# Patient Record
Sex: MALE | Race: BLACK OR AFRICAN AMERICAN | Employment: OTHER | URBAN - NONMETROPOLITAN AREA
[De-identification: names, ages, dates, MRNs, and addresses within clinical notes are randomized per-mention and may not be internally consistent; named-entity substitution may affect disease eponyms.]

---

## 2020-01-01 ENCOUNTER — ANESTHESIA (OUTPATIENT)
Dept: ENDOSCOPY | Age: 65
DRG: 871 | End: 2020-01-01
Payer: MEDICARE

## 2020-01-01 ENCOUNTER — ANESTHESIA EVENT (OUTPATIENT)
Dept: ENDOSCOPY | Age: 65
DRG: 871 | End: 2020-01-01
Payer: MEDICARE

## 2020-01-01 ENCOUNTER — HOSPITAL ENCOUNTER (EMERGENCY)
Age: 65
Discharge: NURSING, REHAB OR LONG-TERM CARE | DRG: 871 | End: 2020-11-28
Attending: EMERGENCY MEDICINE
Payer: MEDICARE

## 2020-01-01 ENCOUNTER — APPOINTMENT (OUTPATIENT)
Dept: GENERAL RADIOLOGY | Age: 65
DRG: 871 | End: 2020-01-01
Attending: EMERGENCY MEDICINE
Payer: MEDICARE

## 2020-01-01 ENCOUNTER — HOSPITAL ENCOUNTER (INPATIENT)
Age: 65
LOS: 5 days | Discharge: NURSING, REHAB OR LONG-TERM CARE | DRG: 871 | End: 2021-01-05
Attending: EMERGENCY MEDICINE | Admitting: HOSPITALIST
Payer: MEDICARE

## 2020-01-01 ENCOUNTER — HOSPITAL ENCOUNTER (INPATIENT)
Age: 65
LOS: 5 days | Discharge: NURSING, REHAB OR LONG-TERM CARE | DRG: 871 | End: 2020-12-06
Attending: EMERGENCY MEDICINE | Admitting: HOSPITALIST
Payer: MEDICARE

## 2020-01-01 VITALS
OXYGEN SATURATION: 96 % | SYSTOLIC BLOOD PRESSURE: 117 MMHG | HEART RATE: 89 BPM | DIASTOLIC BLOOD PRESSURE: 69 MMHG | RESPIRATION RATE: 17 BRPM | TEMPERATURE: 98.9 F

## 2020-01-01 VITALS
HEIGHT: 60 IN | SYSTOLIC BLOOD PRESSURE: 118 MMHG | OXYGEN SATURATION: 97 % | BODY MASS INDEX: 25.54 KG/M2 | DIASTOLIC BLOOD PRESSURE: 75 MMHG | RESPIRATION RATE: 20 BRPM | HEART RATE: 86 BPM | TEMPERATURE: 98.3 F | WEIGHT: 130.1 LBS

## 2020-01-01 DIAGNOSIS — R06.02 SOB (SHORTNESS OF BREATH): Primary | ICD-10-CM

## 2020-01-01 DIAGNOSIS — N17.9 ACUTE RENAL FAILURE, UNSPECIFIED ACUTE RENAL FAILURE TYPE (HCC): ICD-10-CM

## 2020-01-01 DIAGNOSIS — R65.20 SEPSIS WITH ACUTE RENAL FAILURE, DUE TO UNSPECIFIED ORGANISM, UNSPECIFIED ACUTE RENAL FAILURE TYPE, UNSPECIFIED WHETHER SEPTIC SHOCK PRESENT (HCC): Primary | ICD-10-CM

## 2020-01-01 DIAGNOSIS — A41.9 SEPSIS WITH ACUTE RENAL FAILURE, DUE TO UNSPECIFIED ORGANISM, UNSPECIFIED ACUTE RENAL FAILURE TYPE, UNSPECIFIED WHETHER SEPTIC SHOCK PRESENT (HCC): Primary | ICD-10-CM

## 2020-01-01 DIAGNOSIS — F03.90 DEMENTIA WITHOUT BEHAVIORAL DISTURBANCE, UNSPECIFIED DEMENTIA TYPE: ICD-10-CM

## 2020-01-01 DIAGNOSIS — A41.9 SEPSIS WITHOUT ACUTE ORGAN DYSFUNCTION, DUE TO UNSPECIFIED ORGANISM (HCC): Primary | ICD-10-CM

## 2020-01-01 DIAGNOSIS — N17.9 SEPSIS WITH ACUTE RENAL FAILURE, DUE TO UNSPECIFIED ORGANISM, UNSPECIFIED ACUTE RENAL FAILURE TYPE, UNSPECIFIED WHETHER SEPTIC SHOCK PRESENT (HCC): Primary | ICD-10-CM

## 2020-01-01 LAB
ALBUMIN SERPL-MCNC: 1.9 G/DL (ref 3.5–5)
ALBUMIN SERPL-MCNC: 2.6 G/DL (ref 3.5–5)
ALBUMIN SERPL-MCNC: 2.7 G/DL (ref 3.5–5)
ALBUMIN/GLOB SERPL: 0.3 {RATIO} (ref 1.1–2.2)
ALBUMIN/GLOB SERPL: 0.5 {RATIO} (ref 1.1–2.2)
ALBUMIN/GLOB SERPL: 0.5 {RATIO} (ref 1.1–2.2)
ALP SERPL-CCNC: 101 U/L (ref 45–117)
ALP SERPL-CCNC: 104 U/L (ref 45–117)
ALP SERPL-CCNC: 98 U/L (ref 45–117)
ALT SERPL-CCNC: 13 U/L (ref 12–78)
ALT SERPL-CCNC: 13 U/L (ref 12–78)
ALT SERPL-CCNC: 40 U/L (ref 12–78)
AMORPH CRY URNS QL MICRO: ABNORMAL
ANION GAP SERPL CALC-SCNC: 10 MMOL/L (ref 5–15)
ANION GAP SERPL CALC-SCNC: 10 MMOL/L (ref 5–15)
ANION GAP SERPL CALC-SCNC: 11 MMOL/L (ref 5–15)
ANION GAP SERPL CALC-SCNC: 11 MMOL/L (ref 5–15)
ANION GAP SERPL CALC-SCNC: 13 MMOL/L (ref 5–15)
ANION GAP SERPL CALC-SCNC: 14 MMOL/L (ref 5–15)
ANION GAP SERPL CALC-SCNC: 9 MMOL/L (ref 5–15)
APPEARANCE UR: ABNORMAL
APPEARANCE UR: CLEAR
ARTERIAL PATENCY WRIST A: ABNORMAL
ARTERIAL PATENCY WRIST A: ABNORMAL
AST SERPL W P-5'-P-CCNC: 11 U/L (ref 15–37)
AST SERPL W P-5'-P-CCNC: 31 U/L (ref 15–37)
AST SERPL W P-5'-P-CCNC: 32 U/L (ref 15–37)
ATRIAL RATE: 122 BPM
ATRIAL RATE: 88 BPM
ATRIAL RATE: 91 BPM
BACTERIA SPEC CULT: ABNORMAL
BACTERIA SPEC CULT: NORMAL
BACTERIA SPEC CULT: NORMAL
BACTERIA URNS QL MICRO: ABNORMAL /HPF
BACTERIA URNS QL MICRO: ABNORMAL /HPF
BASE DEFICIT BLDA-SCNC: 4 MMOL/L (ref 0–2)
BASE EXCESS BLDA CALC-SCNC: 0.6 MMOL/L (ref 0–2)
BASOPHILS # BLD: 0 K/UL (ref 0–0.2)
BASOPHILS # BLD: 0.1 K/UL (ref 0–0.2)
BASOPHILS NFR BLD: 0 % (ref 0–2.5)
BASOPHILS NFR BLD: 0 % (ref 0–2.5)
BASOPHILS NFR BLD: 1 % (ref 0–2.5)
BDY SITE: ABNORMAL
BDY SITE: ABNORMAL
BILIRUB SERPL-MCNC: 0.4 MG/DL (ref 0.2–1)
BILIRUB SERPL-MCNC: 0.5 MG/DL (ref 0.2–1)
BILIRUB SERPL-MCNC: 0.7 MG/DL (ref 0.2–1)
BILIRUB UR QL CFM: ABNORMAL
BILIRUB UR QL: NEGATIVE
BNP SERPL-MCNC: 328 PG/ML
BREATHS.SPONTANEOUS ON VENT: 14
BUN SERPL-MCNC: 12 MG/DL (ref 6–20)
BUN SERPL-MCNC: 34 MG/DL (ref 6–20)
BUN SERPL-MCNC: 34 MG/DL (ref 6–20)
BUN SERPL-MCNC: 35 MG/DL (ref 6–20)
BUN SERPL-MCNC: 42 MG/DL (ref 6–20)
BUN SERPL-MCNC: 45 MG/DL (ref 6–20)
BUN SERPL-MCNC: 7 MG/DL (ref 6–20)
BUN/CREAT SERPL: 14 (ref 12–20)
BUN/CREAT SERPL: 21 (ref 12–20)
BUN/CREAT SERPL: 26 (ref 12–20)
BUN/CREAT SERPL: 27 (ref 12–20)
BUN/CREAT SERPL: 35 (ref 12–20)
BUN/CREAT SERPL: 38 (ref 12–20)
BUN/CREAT SERPL: 9 (ref 12–20)
CA-I BLD-MCNC: 7.4 MG/DL (ref 8.5–10.1)
CA-I BLD-MCNC: 7.9 MG/DL (ref 8.5–10.1)
CA-I BLD-MCNC: 8.2 MG/DL (ref 8.5–10.1)
CA-I BLD-MCNC: 8.6 MG/DL (ref 8.5–10.1)
CA-I BLD-MCNC: 8.7 MG/DL (ref 8.5–10.1)
CA-I BLD-MCNC: 8.8 MG/DL (ref 8.5–10.1)
CA-I BLD-MCNC: 9.1 MG/DL (ref 8.5–10.1)
CALCULATED P AXIS, ECG09: 77 DEGREES
CALCULATED P AXIS, ECG09: 82 DEGREES
CALCULATED P AXIS, ECG09: 87 DEGREES
CALCULATED R AXIS, ECG10: 61 DEGREES
CALCULATED R AXIS, ECG10: 73 DEGREES
CALCULATED R AXIS, ECG10: 77 DEGREES
CALCULATED T AXIS, ECG11: 77 DEGREES
CALCULATED T AXIS, ECG11: 85 DEGREES
CALCULATED T AXIS, ECG11: 86 DEGREES
CHLORIDE SERPL-SCNC: 107 MMOL/L (ref 97–108)
CHLORIDE SERPL-SCNC: 111 MMOL/L (ref 97–108)
CHLORIDE SERPL-SCNC: 113 MMOL/L (ref 97–108)
CHLORIDE SERPL-SCNC: 114 MMOL/L (ref 97–108)
CHLORIDE SERPL-SCNC: 118 MMOL/L (ref 97–108)
CHLORIDE SERPL-SCNC: 119 MMOL/L (ref 97–108)
CHLORIDE SERPL-SCNC: 120 MMOL/L (ref 97–108)
CO2 SERPL-SCNC: 21 MMOL/L (ref 21–32)
CO2 SERPL-SCNC: 22 MMOL/L (ref 21–32)
CO2 SERPL-SCNC: 24 MMOL/L (ref 21–32)
CO2 SERPL-SCNC: 25 MMOL/L (ref 21–32)
CO2 SERPL-SCNC: 26 MMOL/L (ref 21–32)
COHGB MFR BLD: 0.3 % (ref 0–5)
COHGB MFR BLD: 0.3 % (ref 0–5)
COLONY COUNT,CNT: ABNORMAL
COLOR UR: ABNORMAL
COLOR UR: YELLOW
COVID-19 RAPID TEST, COVR: NEGATIVE
COVID-19 RAPID TEST, COVR: NOT DETECTED
CREAT SERPL-MCNC: 0.79 MG/DL (ref 0.7–1.3)
CREAT SERPL-MCNC: 0.88 MG/DL (ref 0.7–1.3)
CREAT SERPL-MCNC: 0.93 MG/DL (ref 0.7–1.3)
CREAT SERPL-MCNC: 0.97 MG/DL (ref 0.7–1.3)
CREAT SERPL-MCNC: 1.24 MG/DL (ref 0.7–1.3)
CREAT SERPL-MCNC: 1.59 MG/DL (ref 0.7–1.3)
CREAT SERPL-MCNC: 2.15 MG/DL (ref 0.7–1.3)
DIAGNOSIS, 93000: NORMAL
EOSINOPHIL # BLD: 0 K/UL (ref 0–0.7)
EOSINOPHIL NFR BLD: 0 % (ref 0.9–2.9)
EOSINOPHIL NFR BLD: 1 % (ref 0.9–2.9)
EOSINOPHIL NFR BLD: 1 % (ref 0.9–2.9)
EPAP/CPAP/PEEP, PAPEEP: 6
EPITH CASTS URNS QL MICRO: ABNORMAL /LPF
ERYTHROCYTE [DISTWIDTH] IN BLOOD BY AUTOMATED COUNT: 15.4 % (ref 11.5–14.5)
ERYTHROCYTE [DISTWIDTH] IN BLOOD BY AUTOMATED COUNT: 15.7 % (ref 11.5–14.5)
ERYTHROCYTE [DISTWIDTH] IN BLOOD BY AUTOMATED COUNT: 16.2 % (ref 11.5–14.5)
ERYTHROCYTE [DISTWIDTH] IN BLOOD BY AUTOMATED COUNT: 16.3 % (ref 11.5–14.5)
ERYTHROCYTE [DISTWIDTH] IN BLOOD BY AUTOMATED COUNT: 16.4 % (ref 11.5–14.5)
ERYTHROCYTE [DISTWIDTH] IN BLOOD BY AUTOMATED COUNT: 16.5 % (ref 11.5–14.5)
EST. AVERAGE GLUCOSE BLD GHB EST-MCNC: 114 MG/DL
FIO2 ON VENT: 32 %
FIO2 ON VENT: 60 %
GAS FLOW.O2 O2 DELIVERY SYS: 3 L/MIN
GLOBULIN SER CALC-MCNC: 5 G/DL (ref 2–4)
GLOBULIN SER CALC-MCNC: 5.4 G/DL (ref 2–4)
GLOBULIN SER CALC-MCNC: 6 G/DL (ref 2–4)
GLUCOSE BLD STRIP.AUTO-MCNC: 106 MG/DL (ref 65–100)
GLUCOSE BLD STRIP.AUTO-MCNC: 113 MG/DL (ref 65–100)
GLUCOSE BLD STRIP.AUTO-MCNC: 143 MG/DL (ref 65–100)
GLUCOSE BLD STRIP.AUTO-MCNC: 158 MG/DL (ref 65–100)
GLUCOSE BLD STRIP.AUTO-MCNC: 160 MG/DL (ref 65–100)
GLUCOSE BLD STRIP.AUTO-MCNC: 162 MG/DL (ref 65–100)
GLUCOSE BLD STRIP.AUTO-MCNC: 163 MG/DL (ref 65–100)
GLUCOSE BLD STRIP.AUTO-MCNC: 165 MG/DL (ref 65–100)
GLUCOSE BLD STRIP.AUTO-MCNC: 169 MG/DL (ref 65–100)
GLUCOSE BLD STRIP.AUTO-MCNC: 183 MG/DL (ref 65–100)
GLUCOSE BLD STRIP.AUTO-MCNC: 184 MG/DL (ref 65–100)
GLUCOSE BLD STRIP.AUTO-MCNC: 203 MG/DL (ref 65–100)
GLUCOSE BLD STRIP.AUTO-MCNC: 218 MG/DL (ref 65–100)
GLUCOSE BLD STRIP.AUTO-MCNC: 229 MG/DL (ref 65–100)
GLUCOSE BLD STRIP.AUTO-MCNC: 65 MG/DL (ref 65–100)
GLUCOSE BLD STRIP.AUTO-MCNC: 69 MG/DL (ref 65–100)
GLUCOSE BLD STRIP.AUTO-MCNC: 73 MG/DL (ref 65–100)
GLUCOSE BLD STRIP.AUTO-MCNC: 74 MG/DL (ref 65–100)
GLUCOSE BLD STRIP.AUTO-MCNC: 74 MG/DL (ref 65–100)
GLUCOSE BLD STRIP.AUTO-MCNC: 78 MG/DL (ref 65–100)
GLUCOSE BLD STRIP.AUTO-MCNC: 80 MG/DL (ref 65–100)
GLUCOSE BLD STRIP.AUTO-MCNC: 84 MG/DL (ref 65–100)
GLUCOSE BLD STRIP.AUTO-MCNC: 85 MG/DL (ref 65–100)
GLUCOSE BLD STRIP.AUTO-MCNC: 86 MG/DL (ref 65–100)
GLUCOSE BLD STRIP.AUTO-MCNC: 93 MG/DL (ref 65–100)
GLUCOSE BLD STRIP.AUTO-MCNC: 96 MG/DL (ref 65–100)
GLUCOSE SERPL-MCNC: 119 MG/DL (ref 65–100)
GLUCOSE SERPL-MCNC: 142 MG/DL (ref 65–100)
GLUCOSE SERPL-MCNC: 177 MG/DL (ref 65–100)
GLUCOSE SERPL-MCNC: 181 MG/DL (ref 65–100)
GLUCOSE SERPL-MCNC: 187 MG/DL (ref 65–100)
GLUCOSE SERPL-MCNC: 255 MG/DL (ref 65–100)
GLUCOSE SERPL-MCNC: 89 MG/DL (ref 65–100)
GLUCOSE UR STRIP.AUTO-MCNC: NEGATIVE MG/DL
GLUCOSE UR STRIP.AUTO-MCNC: NEGATIVE MG/DL
HBA1C MFR BLD: 5.6 % (ref 4–5.6)
HCO3 BLDA-SCNC: 19 MMOL/L (ref 22–26)
HCO3 BLDA-SCNC: 23 MMOL/L (ref 22–26)
HCT VFR BLD AUTO: 29.1 % (ref 41–53)
HCT VFR BLD AUTO: 30.1 % (ref 41–53)
HCT VFR BLD AUTO: 30.4 % (ref 41–53)
HCT VFR BLD AUTO: 32.3 % (ref 41–53)
HCT VFR BLD AUTO: 33.2 % (ref 41–53)
HCT VFR BLD AUTO: 36.2 % (ref 41–53)
HGB BLD OXIMETRY-MCNC: 11.3 G/DL (ref 13.5–17.5)
HGB BLD OXIMETRY-MCNC: 11.4 G/DL (ref 13.5–17.5)
HGB BLD-MCNC: 10.2 G/DL (ref 13.5–17.5)
HGB BLD-MCNC: 11.1 G/DL (ref 13.5–17.5)
HGB BLD-MCNC: 11.7 G/DL (ref 13.5–17.5)
HGB BLD-MCNC: 9.5 G/DL (ref 13.5–17.5)
HGB BLD-MCNC: 9.7 G/DL (ref 13.5–17.5)
HGB BLD-MCNC: 9.8 G/DL (ref 13.5–17.5)
HGB UR QL STRIP: NEGATIVE
HGB UR QL STRIP: NEGATIVE
INR PPP: 1.1 (ref 0.9–1.1)
INR PPP: 1.2 (ref 0.9–1.1)
KETONES UR QL STRIP.AUTO: NEGATIVE MG/DL
KETONES UR QL STRIP.AUTO: NEGATIVE MG/DL
LACTATE SERPL-SCNC: 0.9 MMOL/L (ref 0.4–2)
LACTATE SERPL-SCNC: 1.8 MMOL/L (ref 0.4–2)
LACTATE SERPL-SCNC: 2.6 MMOL/L (ref 0.4–2)
LACTATE SERPL-SCNC: 3 MMOL/L (ref 0.4–2)
LACTATE SERPL-SCNC: 3 MMOL/L (ref 0.4–2)
LACTATE SERPL-SCNC: 3.5 MMOL/L (ref 0.4–2)
LACTATE SERPL-SCNC: 3.7 MMOL/L (ref 0.4–2)
LACTATE SERPL-SCNC: 4.1 MMOL/L (ref 0.4–2)
LEUKOCYTE ESTERASE UR QL STRIP.AUTO: ABNORMAL
LEUKOCYTE ESTERASE UR QL STRIP.AUTO: NEGATIVE
LIPASE SERPL-CCNC: 67 U/L (ref 73–393)
LYMPHOCYTES # BLD: 1.1 K/UL (ref 1–4.8)
LYMPHOCYTES # BLD: 1.3 K/UL (ref 1–4.8)
LYMPHOCYTES # BLD: 1.4 K/UL (ref 1–4.8)
LYMPHOCYTES # BLD: 1.6 K/UL (ref 1–4.8)
LYMPHOCYTES # BLD: 1.9 K/UL (ref 1–4.8)
LYMPHOCYTES # BLD: 1.9 K/UL (ref 1–4.8)
LYMPHOCYTES NFR BLD: 10 % (ref 20.5–51.1)
LYMPHOCYTES NFR BLD: 11 % (ref 20.5–51.1)
LYMPHOCYTES NFR BLD: 13 % (ref 20.5–51.1)
LYMPHOCYTES NFR BLD: 19 % (ref 20.5–51.1)
LYMPHOCYTES NFR BLD: 25 % (ref 20.5–51.1)
LYMPHOCYTES NFR BLD: 27 % (ref 20.5–51.1)
MAGNESIUM SERPL-MCNC: 2.1 MG/DL (ref 1.6–2.4)
MAGNESIUM SERPL-MCNC: 2.4 MG/DL (ref 1.6–2.4)
MAGNESIUM SERPL-MCNC: 2.5 MG/DL (ref 1.6–2.4)
MCH RBC QN AUTO: 28.4 PG (ref 31–34)
MCH RBC QN AUTO: 29.3 PG (ref 31–34)
MCH RBC QN AUTO: 29.3 PG (ref 31–34)
MCH RBC QN AUTO: 29.4 PG (ref 31–34)
MCH RBC QN AUTO: 29.5 PG (ref 31–34)
MCH RBC QN AUTO: 29.6 PG (ref 31–34)
MCHC RBC AUTO-ENTMCNC: 31.7 G/DL (ref 31–36)
MCHC RBC AUTO-ENTMCNC: 32.1 G/DL (ref 31–36)
MCHC RBC AUTO-ENTMCNC: 32.4 G/DL (ref 31–36)
MCHC RBC AUTO-ENTMCNC: 32.5 G/DL (ref 31–36)
MCHC RBC AUTO-ENTMCNC: 32.8 G/DL (ref 31–36)
MCHC RBC AUTO-ENTMCNC: 33.3 G/DL (ref 31–36)
MCV RBC AUTO: 88.8 FL (ref 80–100)
MCV RBC AUTO: 89.3 FL (ref 80–100)
MCV RBC AUTO: 89.5 FL (ref 80–100)
MCV RBC AUTO: 90.3 FL (ref 80–100)
MCV RBC AUTO: 90.5 FL (ref 80–100)
MCV RBC AUTO: 92 FL (ref 80–100)
METHGB MFR BLD: 0.3 % (ref 0–5)
METHGB MFR BLD: 0.3 % (ref 0–5)
MONOCYTES # BLD: 0.3 K/UL (ref 0.2–2.4)
MONOCYTES # BLD: 0.5 K/UL (ref 0.2–2.4)
MONOCYTES # BLD: 0.6 K/UL (ref 0.2–2.4)
MONOCYTES # BLD: 0.6 K/UL (ref 0.2–2.4)
MONOCYTES # BLD: 0.7 K/UL (ref 0.2–2.4)
MONOCYTES # BLD: 1.1 K/UL (ref 0.2–2.4)
MONOCYTES NFR BLD: 5 % (ref 1.7–9.3)
MONOCYTES NFR BLD: 6 % (ref 1.7–9.3)
MONOCYTES NFR BLD: 6 % (ref 1.7–9.3)
MONOCYTES NFR BLD: 7 % (ref 1.7–9.3)
MONOCYTES NFR BLD: 8 % (ref 1.7–9.3)
MONOCYTES NFR BLD: 8 % (ref 1.7–9.3)
NEUTS SEG # BLD: 11.3 K/UL (ref 1.8–7.7)
NEUTS SEG # BLD: 4.5 K/UL (ref 1.8–7.7)
NEUTS SEG # BLD: 5.1 K/UL (ref 1.8–7.7)
NEUTS SEG # BLD: 6.1 K/UL (ref 1.8–7.7)
NEUTS SEG # BLD: 8.3 K/UL (ref 1.8–7.7)
NEUTS SEG # BLD: 8.8 K/UL (ref 1.8–7.7)
NEUTS SEG NFR BLD: 66 % (ref 42–75)
NEUTS SEG NFR BLD: 67 % (ref 42–75)
NEUTS SEG NFR BLD: 72 % (ref 42–75)
NEUTS SEG NFR BLD: 80 % (ref 42–75)
NEUTS SEG NFR BLD: 81 % (ref 42–75)
NEUTS SEG NFR BLD: 83 % (ref 42–75)
NITRITE UR QL STRIP.AUTO: NEGATIVE
NITRITE UR QL STRIP.AUTO: NEGATIVE
NRBC # BLD: 0 K/UL
NRBC # BLD: 0 K/UL
NRBC # BLD: 0.01 K/UL
NRBC BLD-RTO: 0 PER 100 WBC
NRBC BLD-RTO: 0 PER 100 WBC
NRBC BLD-RTO: 10 PER 100 WBC
OXYHGB MFR BLD: 94 % (ref 95–100)
OXYHGB MFR BLD: 98.3 % (ref 95–100)
P-R INTERVAL, ECG05: 116 MS
P-R INTERVAL, ECG05: 122 MS
P-R INTERVAL, ECG05: 151 MS
PCO2 BLDA: 27 MMHG (ref 35–45)
PCO2 BLDA: 29 MMHG (ref 35–45)
PERFORMED BY, TECHID: ABNORMAL
PERFORMED BY, TECHID: NORMAL
PH BLDA: 7.46 [PH] (ref 7.35–7.45)
PH BLDA: 7.52 [PH] (ref 7.35–7.45)
PH UR STRIP: 5.5 [PH] (ref 5–8)
PH UR STRIP: 8.5 [PH] (ref 5–8)
PLATELET # BLD AUTO: 297 K/UL
PLATELET # BLD AUTO: 316 K/UL
PLATELET # BLD AUTO: 383 K/UL
PLATELET # BLD AUTO: 453 K/UL
PLATELET # BLD AUTO: 471 K/UL
PLATELET # BLD AUTO: 506 K/UL
PMV BLD AUTO: 6.9 FL (ref 6.5–11.5)
PMV BLD AUTO: 7.7 FL (ref 6.5–11.5)
PMV BLD AUTO: 7.7 FL (ref 6.5–11.5)
PMV BLD AUTO: 7.8 FL (ref 6.5–11.5)
PMV BLD AUTO: 8.5 FL (ref 6.5–11.5)
PMV BLD AUTO: 8.7 FL (ref 6.5–11.5)
PO2 BLDA: 149 MMHG (ref 70–100)
PO2 BLDA: 71 MMHG (ref 70–100)
POTASSIUM SERPL-SCNC: 3 MMOL/L (ref 3.5–5.1)
POTASSIUM SERPL-SCNC: 3.2 MMOL/L (ref 3.5–5.1)
POTASSIUM SERPL-SCNC: 3.5 MMOL/L (ref 3.5–5.1)
POTASSIUM SERPL-SCNC: 3.7 MMOL/L (ref 3.5–5.1)
POTASSIUM SERPL-SCNC: 4.2 MMOL/L (ref 3.5–5.1)
POTASSIUM SERPL-SCNC: 4.2 MMOL/L (ref 3.5–5.1)
POTASSIUM SERPL-SCNC: 4.6 MMOL/L (ref 3.5–5.1)
PRESSURE SUPPORT SETTING VENT: 6 CM[H2O]
PROT SERPL-MCNC: 7.6 G/DL (ref 6.4–8.2)
PROT SERPL-MCNC: 7.9 G/DL (ref 6.4–8.2)
PROT SERPL-MCNC: 8.1 G/DL (ref 6.4–8.2)
PROT UR STRIP-MCNC: 30 MG/DL
PROT UR STRIP-MCNC: ABNORMAL MG/DL
PROTHROMBIN TIME: 10.8 SEC (ref 9–11.1)
PROTHROMBIN TIME: 11.5 SEC (ref 9–11.1)
Q-T INTERVAL, ECG07: 309 MS
Q-T INTERVAL, ECG07: 371 MS
Q-T INTERVAL, ECG07: 371 MS
QRS DURATION, ECG06: 65 MS
QRS DURATION, ECG06: 76 MS
QRS DURATION, ECG06: 79 MS
QTC CALCULATION (BEZET), ECG08: 442 MS
QTC CALCULATION (BEZET), ECG08: 447 MS
QTC CALCULATION (BEZET), ECG08: 457 MS
RBC # BLD AUTO: 3.25 M/UL (ref 4.5–5.9)
RBC # BLD AUTO: 3.3 M/UL (ref 4.5–5.9)
RBC # BLD AUTO: 3.34 M/UL (ref 4.5–5.9)
RBC # BLD AUTO: 3.6 M/UL (ref 4.5–5.9)
RBC # BLD AUTO: 3.74 M/UL (ref 4.5–5.9)
RBC # BLD AUTO: 4 M/UL (ref 4.5–5.9)
RBC #/AREA URNS HPF: ABNORMAL /HPF (ref 0–3)
RBC #/AREA URNS HPF: ABNORMAL /HPF (ref 0–3)
SAO2% DEVICE SAO2% SENSOR NAME: ABNORMAL
SAO2% DEVICE SAO2% SENSOR NAME: ABNORMAL
SARS-COV-2, COV2: NORMAL
SODIUM SERPL-SCNC: 142 MMOL/L (ref 136–145)
SODIUM SERPL-SCNC: 146 MMOL/L (ref 136–145)
SODIUM SERPL-SCNC: 148 MMOL/L (ref 136–145)
SODIUM SERPL-SCNC: 149 MMOL/L (ref 136–145)
SODIUM SERPL-SCNC: 150 MMOL/L (ref 136–145)
SODIUM SERPL-SCNC: 152 MMOL/L (ref 136–145)
SODIUM SERPL-SCNC: 155 MMOL/L (ref 136–145)
SP GR UR REFRACTOMETRY: 1.01 (ref 1–1.03)
SP GR UR REFRACTOMETRY: 1.02 (ref 1–1.03)
SPECIAL REQUESTS,SREQ: ABNORMAL
SPECIAL REQUESTS,SREQ: NORMAL
SPECIAL REQUESTS,SREQ: NORMAL
SPECIMEN SITE: ABNORMAL
SPECIMEN SITE: ABNORMAL
SPECIMEN SOURCE: ABNORMAL
SPECIMEN SOURCE: NORMAL
TROPONIN I SERPL-MCNC: <0.05 NG/ML
UROBILINOGEN UR QL STRIP.AUTO: 1 EU/DL (ref 0.2–1)
UROBILINOGEN UR QL STRIP.AUTO: 1 EU/DL (ref 0.2–1)
VENTRICULAR RATE, ECG03: 123 BPM
VENTRICULAR RATE, ECG03: 87 BPM
VENTRICULAR RATE, ECG03: 91 BPM
WBC # BLD AUTO: 10.3 K/UL (ref 4.4–11.3)
WBC # BLD AUTO: 10.6 K/UL (ref 4.4–11.3)
WBC # BLD AUTO: 13.9 K/UL (ref 4.4–11.3)
WBC # BLD AUTO: 6.8 K/UL (ref 4.4–11.3)
WBC # BLD AUTO: 7.5 K/UL (ref 4.4–11.3)
WBC # BLD AUTO: 8.4 K/UL (ref 4.4–11.3)
WBC URNS QL MICRO: ABNORMAL /HPF (ref 0–5)
WBC URNS QL MICRO: ABNORMAL /HPF (ref 0–5)

## 2020-01-01 PROCEDURE — 74011636637 HC RX REV CODE- 636/637: Performed by: HOSPITALIST

## 2020-01-01 PROCEDURE — 82962 GLUCOSE BLOOD TEST: CPT

## 2020-01-01 PROCEDURE — 74011250637 HC RX REV CODE- 250/637: Performed by: HOSPITALIST

## 2020-01-01 PROCEDURE — 96372 THER/PROPH/DIAG INJ SC/IM: CPT

## 2020-01-01 PROCEDURE — 74011000250 HC RX REV CODE- 250: Performed by: NURSE ANESTHETIST, CERTIFIED REGISTERED

## 2020-01-01 PROCEDURE — 74011000250 HC RX REV CODE- 250: Performed by: HOSPITALIST

## 2020-01-01 PROCEDURE — 94640 AIRWAY INHALATION TREATMENT: CPT

## 2020-01-01 PROCEDURE — 85025 COMPLETE CBC W/AUTO DIFF WBC: CPT

## 2020-01-01 PROCEDURE — 96365 THER/PROPH/DIAG IV INF INIT: CPT

## 2020-01-01 PROCEDURE — 74011000258 HC RX REV CODE- 258: Performed by: HOSPITALIST

## 2020-01-01 PROCEDURE — 65270000029 HC RM PRIVATE

## 2020-01-01 PROCEDURE — 3E0F7SF INTRODUCTION OF OTHER GAS INTO RESPIRATORY TRACT, VIA NATURAL OR ARTIFICIAL OPENING: ICD-10-PCS | Performed by: HOSPITALIST

## 2020-01-01 PROCEDURE — 76060000032 HC ANESTHESIA 0.5 TO 1 HR: Performed by: INTERNAL MEDICINE

## 2020-01-01 PROCEDURE — 99218 HC RM OBSERVATION: CPT

## 2020-01-01 PROCEDURE — 80053 COMPREHEN METABOLIC PANEL: CPT

## 2020-01-01 PROCEDURE — 99283 EMERGENCY DEPT VISIT LOW MDM: CPT

## 2020-01-01 PROCEDURE — 36415 COLL VENOUS BLD VENIPUNCTURE: CPT

## 2020-01-01 PROCEDURE — 36600 WITHDRAWAL OF ARTERIAL BLOOD: CPT

## 2020-01-01 PROCEDURE — 74011250636 HC RX REV CODE- 250/636: Performed by: FAMILY MEDICINE

## 2020-01-01 PROCEDURE — 74011250636 HC RX REV CODE- 250/636: Performed by: HOSPITALIST

## 2020-01-01 PROCEDURE — 77010033678 HC OXYGEN DAILY

## 2020-01-01 PROCEDURE — 83605 ASSAY OF LACTIC ACID: CPT

## 2020-01-01 PROCEDURE — 94760 N-INVAS EAR/PLS OXIMETRY 1: CPT

## 2020-01-01 PROCEDURE — 81003 URINALYSIS AUTO W/O SCOPE: CPT

## 2020-01-01 PROCEDURE — 74011250637 HC RX REV CODE- 250/637: Performed by: EMERGENCY MEDICINE

## 2020-01-01 PROCEDURE — 94660 CPAP INITIATION&MGMT: CPT

## 2020-01-01 PROCEDURE — 87070 CULTURE OTHR SPECIMN AEROBIC: CPT

## 2020-01-01 PROCEDURE — 71045 X-RAY EXAM CHEST 1 VIEW: CPT

## 2020-01-01 PROCEDURE — 80048 BASIC METABOLIC PNL TOTAL CA: CPT

## 2020-01-01 PROCEDURE — 77030005122 HC CATH GASTMY PEG BSC -B: Performed by: INTERNAL MEDICINE

## 2020-01-01 PROCEDURE — 93005 ELECTROCARDIOGRAM TRACING: CPT

## 2020-01-01 PROCEDURE — 96375 TX/PRO/DX INJ NEW DRUG ADDON: CPT

## 2020-01-01 PROCEDURE — 85610 PROTHROMBIN TIME: CPT

## 2020-01-01 PROCEDURE — 84484 ASSAY OF TROPONIN QUANT: CPT

## 2020-01-01 PROCEDURE — 96366 THER/PROPH/DIAG IV INF ADDON: CPT

## 2020-01-01 PROCEDURE — 83690 ASSAY OF LIPASE: CPT

## 2020-01-01 PROCEDURE — 99223 1ST HOSP IP/OBS HIGH 75: CPT | Performed by: INTERNAL MEDICINE

## 2020-01-01 PROCEDURE — 83735 ASSAY OF MAGNESIUM: CPT

## 2020-01-01 PROCEDURE — 5A09357 ASSISTANCE WITH RESPIRATORY VENTILATION, LESS THAN 24 CONSECUTIVE HOURS, CONTINUOUS POSITIVE AIRWAY PRESSURE: ICD-10-PCS | Performed by: HOSPITALIST

## 2020-01-01 PROCEDURE — 96367 TX/PROPH/DG ADDL SEQ IV INF: CPT

## 2020-01-01 PROCEDURE — 74011000258 HC RX REV CODE- 258: Performed by: NURSE ANESTHETIST, CERTIFIED REGISTERED

## 2020-01-01 PROCEDURE — 81001 URINALYSIS AUTO W/SCOPE: CPT

## 2020-01-01 PROCEDURE — 74011250637 HC RX REV CODE- 250/637: Performed by: NURSE PRACTITIONER

## 2020-01-01 PROCEDURE — 96376 TX/PRO/DX INJ SAME DRUG ADON: CPT

## 2020-01-01 PROCEDURE — 3E0G76Z INTRODUCTION OF NUTRITIONAL SUBSTANCE INTO UPPER GI, VIA NATURAL OR ARTIFICIAL OPENING: ICD-10-PCS | Performed by: INTERNAL MEDICINE

## 2020-01-01 PROCEDURE — 74011250636 HC RX REV CODE- 250/636: Performed by: NURSE PRACTITIONER

## 2020-01-01 PROCEDURE — 43246 EGD PLACE GASTROSTOMY TUBE: CPT | Performed by: INTERNAL MEDICINE

## 2020-01-01 PROCEDURE — 87086 URINE CULTURE/COLONY COUNT: CPT

## 2020-01-01 PROCEDURE — 74011250636 HC RX REV CODE- 250/636: Performed by: EMERGENCY MEDICINE

## 2020-01-01 PROCEDURE — 82803 BLOOD GASES ANY COMBINATION: CPT

## 2020-01-01 PROCEDURE — 74011000258 HC RX REV CODE- 258: Performed by: EMERGENCY MEDICINE

## 2020-01-01 PROCEDURE — 83036 HEMOGLOBIN GLYCOSYLATED A1C: CPT

## 2020-01-01 PROCEDURE — 87635 SARS-COV-2 COVID-19 AMP PRB: CPT

## 2020-01-01 PROCEDURE — 0DH63UZ INSERTION OF FEEDING DEVICE INTO STOMACH, PERCUTANEOUS APPROACH: ICD-10-PCS | Performed by: INTERNAL MEDICINE

## 2020-01-01 PROCEDURE — 2709999900 HC NON-CHARGEABLE SUPPLY: Performed by: INTERNAL MEDICINE

## 2020-01-01 PROCEDURE — 87077 CULTURE AEROBIC IDENTIFY: CPT

## 2020-01-01 PROCEDURE — 99285 EMERGENCY DEPT VISIT HI MDM: CPT

## 2020-01-01 PROCEDURE — 87186 SC STD MICRODIL/AGAR DIL: CPT

## 2020-01-01 PROCEDURE — 76040000007: Performed by: INTERNAL MEDICINE

## 2020-01-01 PROCEDURE — 74011250636 HC RX REV CODE- 250/636: Performed by: NURSE ANESTHETIST, CERTIFIED REGISTERED

## 2020-01-01 PROCEDURE — 92610 EVALUATE SWALLOWING FUNCTION: CPT

## 2020-01-01 PROCEDURE — 87040 BLOOD CULTURE FOR BACTERIA: CPT

## 2020-01-01 PROCEDURE — 74011000258 HC RX REV CODE- 258: Performed by: NURSE PRACTITIONER

## 2020-01-01 PROCEDURE — 83880 ASSAY OF NATRIURETIC PEPTIDE: CPT

## 2020-01-01 RX ORDER — FAMOTIDINE 20 MG/1
20 TABLET, FILM COATED ORAL DAILY
Status: DISCONTINUED | OUTPATIENT
Start: 2020-01-01 | End: 2020-01-01 | Stop reason: HOSPADM

## 2020-01-01 RX ORDER — PANTOPRAZOLE SODIUM 40 MG/1
40 TABLET, DELAYED RELEASE ORAL DAILY
COMMUNITY
End: 2020-01-01

## 2020-01-01 RX ORDER — ACETAMINOPHEN 325 MG/1
650 TABLET ORAL
Status: DISCONTINUED | OUTPATIENT
Start: 2020-01-01 | End: 2021-01-01 | Stop reason: HOSPADM

## 2020-01-01 RX ORDER — SODIUM CHLORIDE 0.9 % (FLUSH) 0.9 %
5-40 SYRINGE (ML) INJECTION AS NEEDED
Status: DISCONTINUED | OUTPATIENT
Start: 2020-01-01 | End: 2020-01-01 | Stop reason: HOSPADM

## 2020-01-01 RX ORDER — CLOPIDOGREL BISULFATE 75 MG/1
75 TABLET ORAL DAILY
Status: ON HOLD | COMMUNITY
End: 2020-01-01 | Stop reason: SDUPTHER

## 2020-01-01 RX ORDER — AMOXICILLIN AND CLAVULANATE POTASSIUM 875; 125 MG/1; MG/1
1 TABLET, FILM COATED ORAL 2 TIMES DAILY
Qty: 14 TAB | Refills: 0 | Status: SHIPPED | OUTPATIENT
Start: 2020-01-01 | End: 2020-01-01

## 2020-01-01 RX ORDER — SODIUM CHLORIDE 0.9 % (FLUSH) 0.9 %
5-40 SYRINGE (ML) INJECTION EVERY 8 HOURS
Status: DISCONTINUED | OUTPATIENT
Start: 2020-01-01 | End: 2020-01-01

## 2020-01-01 RX ORDER — POLYETHYLENE GLYCOL 3350 17 G/17G
17 POWDER, FOR SOLUTION ORAL DAILY PRN
Status: DISCONTINUED | OUTPATIENT
Start: 2020-01-01 | End: 2021-01-01 | Stop reason: HOSPADM

## 2020-01-01 RX ORDER — DEXTROSE 50 % IN WATER (D50W) INTRAVENOUS SYRINGE
25-50 AS NEEDED
Status: DISCONTINUED | OUTPATIENT
Start: 2020-01-01 | End: 2021-01-01 | Stop reason: HOSPADM

## 2020-01-01 RX ORDER — ONDANSETRON 2 MG/ML
4 INJECTION INTRAMUSCULAR; INTRAVENOUS
Status: DISCONTINUED | OUTPATIENT
Start: 2020-01-01 | End: 2020-01-01 | Stop reason: HOSPADM

## 2020-01-01 RX ORDER — ACETAMINOPHEN 325 MG/1
650 TABLET ORAL
Status: DISCONTINUED | OUTPATIENT
Start: 2020-01-01 | End: 2020-01-01 | Stop reason: HOSPADM

## 2020-01-01 RX ORDER — HYDRALAZINE HYDROCHLORIDE 25 MG/1
25 TABLET, FILM COATED ORAL
Status: DISCONTINUED | OUTPATIENT
Start: 2020-01-01 | End: 2020-01-01

## 2020-01-01 RX ORDER — ACETAMINOPHEN 650 MG/1
650 SUPPOSITORY RECTAL
Status: DISCONTINUED | OUTPATIENT
Start: 2020-01-01 | End: 2021-01-01 | Stop reason: HOSPADM

## 2020-01-01 RX ORDER — LIDOCAINE HYDROCHLORIDE 20 MG/ML
INJECTION, SOLUTION INFILTRATION; PERINEURAL AS NEEDED
Status: DISCONTINUED | OUTPATIENT
Start: 2020-01-01 | End: 2020-01-01 | Stop reason: HOSPADM

## 2020-01-01 RX ORDER — ATORVASTATIN CALCIUM 40 MG/1
40 TABLET, FILM COATED ORAL
Status: ON HOLD | COMMUNITY
End: 2020-01-01 | Stop reason: SDUPTHER

## 2020-01-01 RX ORDER — LISINOPRIL 10 MG/1
10 TABLET ORAL DAILY
COMMUNITY
End: 2020-01-01

## 2020-01-01 RX ORDER — LEVOFLOXACIN 5 MG/ML
750 INJECTION, SOLUTION INTRAVENOUS EVERY 24 HOURS
Status: DISCONTINUED | OUTPATIENT
Start: 2020-01-01 | End: 2021-01-01

## 2020-01-01 RX ORDER — MAGNESIUM SULFATE 100 %
4 CRYSTALS MISCELLANEOUS AS NEEDED
Status: DISCONTINUED | OUTPATIENT
Start: 2020-01-01 | End: 2020-01-01 | Stop reason: HOSPADM

## 2020-01-01 RX ORDER — INSULIN LISPRO 100 [IU]/ML
INJECTION, SOLUTION INTRAVENOUS; SUBCUTANEOUS
Status: ON HOLD | COMMUNITY
End: 2021-01-01 | Stop reason: SDUPTHER

## 2020-01-01 RX ORDER — SODIUM CHLORIDE 0.9 % (FLUSH) 0.9 %
5-40 SYRINGE (ML) INJECTION AS NEEDED
Status: DISCONTINUED | OUTPATIENT
Start: 2020-01-01 | End: 2021-01-01 | Stop reason: HOSPADM

## 2020-01-01 RX ORDER — PANTOPRAZOLE SODIUM 40 MG/1
40 GRANULE, DELAYED RELEASE ORAL DAILY
Qty: 30 EACH | Refills: 0 | Status: SHIPPED | OUTPATIENT
Start: 2020-01-01 | End: 2020-01-01

## 2020-01-01 RX ORDER — CLOPIDOGREL BISULFATE 75 MG/1
75 TABLET ORAL DAILY
Qty: 30 TAB | Refills: 0 | Status: SHIPPED | OUTPATIENT
Start: 2020-01-01

## 2020-01-01 RX ORDER — INSULIN LISPRO 100 [IU]/ML
INJECTION, SOLUTION INTRAVENOUS; SUBCUTANEOUS
Status: DISCONTINUED | OUTPATIENT
Start: 2020-01-01 | End: 2020-01-01 | Stop reason: HOSPADM

## 2020-01-01 RX ORDER — DEXTROSE MONOHYDRATE AND SODIUM CHLORIDE 5; .45 G/100ML; G/100ML
125 INJECTION, SOLUTION INTRAVENOUS CONTINUOUS
Status: DISCONTINUED | OUTPATIENT
Start: 2020-01-01 | End: 2020-01-01

## 2020-01-01 RX ORDER — ENOXAPARIN SODIUM 100 MG/ML
40 INJECTION SUBCUTANEOUS DAILY
Status: DISCONTINUED | OUTPATIENT
Start: 2020-01-01 | End: 2021-01-01 | Stop reason: HOSPADM

## 2020-01-01 RX ORDER — IPRATROPIUM BROMIDE AND ALBUTEROL SULFATE 2.5; .5 MG/3ML; MG/3ML
3 SOLUTION RESPIRATORY (INHALATION)
Status: DISCONTINUED | OUTPATIENT
Start: 2020-01-01 | End: 2021-01-01 | Stop reason: HOSPADM

## 2020-01-01 RX ORDER — SODIUM CHLORIDE AND POTASSIUM CHLORIDE .9; .15 G/100ML; G/100ML
SOLUTION INTRAVENOUS CONTINUOUS
Status: DISCONTINUED | OUTPATIENT
Start: 2020-01-01 | End: 2020-01-01

## 2020-01-01 RX ORDER — KETAMINE HYDROCHLORIDE 10 MG/ML
INJECTION, SOLUTION INTRAMUSCULAR; INTRAVENOUS AS NEEDED
Status: DISCONTINUED | OUTPATIENT
Start: 2020-01-01 | End: 2020-01-01 | Stop reason: HOSPADM

## 2020-01-01 RX ORDER — MORPHINE SULFATE 4 MG/ML
4 INJECTION, SOLUTION INTRAMUSCULAR; INTRAVENOUS
Status: COMPLETED | OUTPATIENT
Start: 2020-01-01 | End: 2020-01-01

## 2020-01-01 RX ORDER — IPRATROPIUM BROMIDE AND ALBUTEROL SULFATE 2.5; .5 MG/3ML; MG/3ML
3 SOLUTION RESPIRATORY (INHALATION)
Status: DISCONTINUED | OUTPATIENT
Start: 2020-01-01 | End: 2020-01-01 | Stop reason: HOSPADM

## 2020-01-01 RX ORDER — IPRATROPIUM BROMIDE AND ALBUTEROL SULFATE 2.5; .5 MG/3ML; MG/3ML
3 SOLUTION RESPIRATORY (INHALATION) 4 TIMES DAILY
Qty: 30 NEBULE | Refills: 0 | Status: SHIPPED | OUTPATIENT
Start: 2020-01-01

## 2020-01-01 RX ORDER — DEXTROSE MONOHYDRATE AND SODIUM CHLORIDE 5; .45 G/100ML; G/100ML
75 INJECTION, SOLUTION INTRAVENOUS CONTINUOUS
Status: DISCONTINUED | OUTPATIENT
Start: 2020-01-01 | End: 2020-01-01

## 2020-01-01 RX ORDER — ONDANSETRON 2 MG/ML
4 INJECTION INTRAMUSCULAR; INTRAVENOUS
Status: DISCONTINUED | OUTPATIENT
Start: 2020-01-01 | End: 2021-01-01 | Stop reason: HOSPADM

## 2020-01-01 RX ORDER — HEPARIN SODIUM 5000 [USP'U]/ML
5000 INJECTION, SOLUTION INTRAVENOUS; SUBCUTANEOUS EVERY 12 HOURS
Status: DISCONTINUED | OUTPATIENT
Start: 2020-01-01 | End: 2020-01-01 | Stop reason: HOSPADM

## 2020-01-01 RX ORDER — DEXTROSE 50 % IN WATER (D50W) INTRAVENOUS SYRINGE
25-50 AS NEEDED
Status: DISCONTINUED | OUTPATIENT
Start: 2020-01-01 | End: 2020-01-01 | Stop reason: HOSPADM

## 2020-01-01 RX ORDER — DEXTROSE, SODIUM CHLORIDE, AND POTASSIUM CHLORIDE 5; .45; .15 G/100ML; G/100ML; G/100ML
75 INJECTION INTRAVENOUS CONTINUOUS
Status: DISCONTINUED | OUTPATIENT
Start: 2020-01-01 | End: 2020-01-01

## 2020-01-01 RX ORDER — PROPOFOL 10 MG/ML
INJECTION, EMULSION INTRAVENOUS AS NEEDED
Status: DISCONTINUED | OUTPATIENT
Start: 2020-01-01 | End: 2020-01-01 | Stop reason: HOSPADM

## 2020-01-01 RX ORDER — SODIUM CHLORIDE 0.9 % (FLUSH) 0.9 %
5-40 SYRINGE (ML) INJECTION EVERY 8 HOURS
Status: CANCELLED | OUTPATIENT
Start: 2020-01-01

## 2020-01-01 RX ORDER — CLOPIDOGREL BISULFATE 75 MG/1
75 TABLET ORAL DAILY
Status: DISCONTINUED | OUTPATIENT
Start: 2020-01-01 | End: 2021-01-01 | Stop reason: HOSPADM

## 2020-01-01 RX ORDER — ATORVASTATIN CALCIUM 40 MG/1
40 TABLET, FILM COATED ORAL
Qty: 30 TAB | Refills: 0 | Status: SHIPPED | OUTPATIENT
Start: 2020-01-01 | End: 2020-01-01

## 2020-01-01 RX ORDER — POTASSIUM CHLORIDE 1.5 G/1.77G
20 POWDER, FOR SOLUTION ORAL DAILY
Qty: 30 PACKET | Refills: 0 | Status: SHIPPED | OUTPATIENT
Start: 2020-01-01

## 2020-01-01 RX ORDER — ACETAMINOPHEN 650 MG/1
650 SUPPOSITORY RECTAL
Status: DISCONTINUED | OUTPATIENT
Start: 2020-01-01 | End: 2020-01-01 | Stop reason: HOSPADM

## 2020-01-01 RX ORDER — AMOXICILLIN AND CLAVULANATE POTASSIUM 875; 125 MG/1; MG/1
1 TABLET, FILM COATED ORAL EVERY 12 HOURS
Status: DISCONTINUED | OUTPATIENT
Start: 2020-01-01 | End: 2020-01-01 | Stop reason: HOSPADM

## 2020-01-01 RX ORDER — SODIUM CHLORIDE 9 MG/ML
999 INJECTION, SOLUTION INTRAVENOUS ONCE
Status: COMPLETED | OUTPATIENT
Start: 2020-01-01 | End: 2020-01-01

## 2020-01-01 RX ORDER — LEVOFLOXACIN 5 MG/ML
750 INJECTION, SOLUTION INTRAVENOUS
Status: COMPLETED | OUTPATIENT
Start: 2020-01-01 | End: 2020-01-01

## 2020-01-01 RX ORDER — SODIUM CHLORIDE 9 MG/ML
INJECTION, SOLUTION INTRAVENOUS
Status: DISCONTINUED | OUTPATIENT
Start: 2020-01-01 | End: 2020-01-01 | Stop reason: HOSPADM

## 2020-01-01 RX ORDER — MAGNESIUM SULFATE 100 %
4 CRYSTALS MISCELLANEOUS AS NEEDED
Status: DISCONTINUED | OUTPATIENT
Start: 2020-01-01 | End: 2021-01-01 | Stop reason: HOSPADM

## 2020-01-01 RX ORDER — POLYETHYLENE GLYCOL 3350 17 G/17G
17 POWDER, FOR SOLUTION ORAL DAILY PRN
Status: DISCONTINUED | OUTPATIENT
Start: 2020-01-01 | End: 2020-01-01 | Stop reason: HOSPADM

## 2020-01-01 RX ORDER — GUAIFENESIN 600 MG/1
600 TABLET, EXTENDED RELEASE ORAL EVERY 12 HOURS
Status: DISCONTINUED | OUTPATIENT
Start: 2020-01-01 | End: 2020-01-01

## 2020-01-01 RX ORDER — SODIUM CHLORIDE 0.9 % (FLUSH) 0.9 %
5-40 SYRINGE (ML) INJECTION AS NEEDED
Status: CANCELLED | OUTPATIENT
Start: 2020-01-01

## 2020-01-01 RX ORDER — PROMETHAZINE HYDROCHLORIDE 25 MG/1
12.5 TABLET ORAL
Status: DISCONTINUED | OUTPATIENT
Start: 2020-01-01 | End: 2021-01-01 | Stop reason: HOSPADM

## 2020-01-01 RX ORDER — INSULIN LISPRO 100 [IU]/ML
INJECTION, SOLUTION INTRAVENOUS; SUBCUTANEOUS EVERY 6 HOURS
Status: DISCONTINUED | OUTPATIENT
Start: 2020-01-01 | End: 2021-01-01 | Stop reason: HOSPADM

## 2020-01-01 RX ORDER — SODIUM CHLORIDE 0.9 % (FLUSH) 0.9 %
5-40 SYRINGE (ML) INJECTION EVERY 8 HOURS
Status: DISCONTINUED | OUTPATIENT
Start: 2020-01-01 | End: 2021-01-01 | Stop reason: HOSPADM

## 2020-01-01 RX ADMIN — Medication 10 ML: at 21:19

## 2020-01-01 RX ADMIN — IPRATROPIUM BROMIDE AND ALBUTEROL SULFATE 3 ML: .5; 3 SOLUTION RESPIRATORY (INHALATION) at 00:45

## 2020-01-01 RX ADMIN — Medication 10 ML: at 17:27

## 2020-01-01 RX ADMIN — IPRATROPIUM BROMIDE AND ALBUTEROL SULFATE 3 ML: .5; 3 SOLUTION RESPIRATORY (INHALATION) at 19:19

## 2020-01-01 RX ADMIN — PROPOFOL 40 MG: 10 INJECTION, EMULSION INTRAVENOUS at 10:48

## 2020-01-01 RX ADMIN — HEPARIN SODIUM 5000 UNITS: 5000 INJECTION INTRAVENOUS; SUBCUTANEOUS at 04:00

## 2020-01-01 RX ADMIN — Medication 10 ML: at 17:05

## 2020-01-01 RX ADMIN — ENOXAPARIN SODIUM 40 MG: 40 INJECTION SUBCUTANEOUS at 09:08

## 2020-01-01 RX ADMIN — LEVOFLOXACIN 750 MG: 5 INJECTION, SOLUTION INTRAVENOUS at 23:20

## 2020-01-01 RX ADMIN — IPRATROPIUM BROMIDE AND ALBUTEROL SULFATE 3 ML: .5; 3 SOLUTION RESPIRATORY (INHALATION) at 23:39

## 2020-01-01 RX ADMIN — Medication 10 ML: at 21:24

## 2020-01-01 RX ADMIN — IPRATROPIUM BROMIDE AND ALBUTEROL SULFATE 3 ML: .5; 3 SOLUTION RESPIRATORY (INHALATION) at 20:01

## 2020-01-01 RX ADMIN — PIPERACILLIN AND TAZOBACTAM 3.38 G: 3; .375 INJECTION, POWDER, LYOPHILIZED, FOR SOLUTION INTRAVENOUS at 21:18

## 2020-01-01 RX ADMIN — IPRATROPIUM BROMIDE AND ALBUTEROL SULFATE 3 ML: .5; 3 SOLUTION RESPIRATORY (INHALATION) at 07:56

## 2020-01-01 RX ADMIN — DEXTROSE AND SODIUM CHLORIDE 125 ML/HR: 5; 450 INJECTION, SOLUTION INTRAVENOUS at 16:04

## 2020-01-01 RX ADMIN — INSULIN LISPRO 2 UNITS: 100 INJECTION, SOLUTION INTRAVENOUS; SUBCUTANEOUS at 12:40

## 2020-01-01 RX ADMIN — IPRATROPIUM BROMIDE AND ALBUTEROL SULFATE 3 ML: .5; 3 SOLUTION RESPIRATORY (INHALATION) at 19:32

## 2020-01-01 RX ADMIN — CLOPIDOGREL BISULFATE 75 MG: 75 TABLET ORAL at 09:10

## 2020-01-01 RX ADMIN — HEPARIN SODIUM 5000 UNITS: 5000 INJECTION INTRAVENOUS; SUBCUTANEOUS at 17:21

## 2020-01-01 RX ADMIN — INSULIN LISPRO 2 UNITS: 100 INJECTION, SOLUTION INTRAVENOUS; SUBCUTANEOUS at 17:49

## 2020-01-01 RX ADMIN — FAMOTIDINE 20 MG: 10 INJECTION INTRAVENOUS at 09:05

## 2020-01-01 RX ADMIN — POTASSIUM CHLORIDE AND SODIUM CHLORIDE: 900; 150 INJECTION, SOLUTION INTRAVENOUS at 04:16

## 2020-01-01 RX ADMIN — GUAIFENESIN 600 MG: 600 TABLET, EXTENDED RELEASE ORAL at 14:54

## 2020-01-01 RX ADMIN — PIPERACILLIN AND TAZOBACTAM 3.38 G: 3; .375 INJECTION, POWDER, LYOPHILIZED, FOR SOLUTION INTRAVENOUS at 13:33

## 2020-01-01 RX ADMIN — Medication 10 ML: at 21:28

## 2020-01-01 RX ADMIN — IPRATROPIUM BROMIDE AND ALBUTEROL SULFATE 3 ML: .5; 3 SOLUTION RESPIRATORY (INHALATION) at 16:19

## 2020-01-01 RX ADMIN — PIPERACILLIN AND TAZOBACTAM 3.38 G: 3; .375 INJECTION, POWDER, LYOPHILIZED, FOR SOLUTION INTRAVENOUS at 19:31

## 2020-01-01 RX ADMIN — INSULIN LISPRO 2 UNITS: 100 INJECTION, SOLUTION INTRAVENOUS; SUBCUTANEOUS at 06:00

## 2020-01-01 RX ADMIN — AZITHROMYCIN DIHYDRATE 500 MG: 500 INJECTION, POWDER, LYOPHILIZED, FOR SOLUTION INTRAVENOUS at 15:05

## 2020-01-01 RX ADMIN — IPRATROPIUM BROMIDE AND ALBUTEROL SULFATE 3 ML: .5; 3 SOLUTION RESPIRATORY (INHALATION) at 11:22

## 2020-01-01 RX ADMIN — POTASSIUM CHLORIDE, DEXTROSE MONOHYDRATE AND SODIUM CHLORIDE 75 ML/HR: 150; 5; 450 INJECTION, SOLUTION INTRAVENOUS at 06:55

## 2020-01-01 RX ADMIN — HEPARIN SODIUM 5000 UNITS: 5000 INJECTION INTRAVENOUS; SUBCUTANEOUS at 03:34

## 2020-01-01 RX ADMIN — Medication 30 MG: at 10:49

## 2020-01-01 RX ADMIN — FAMOTIDINE 20 MG: 10 INJECTION INTRAVENOUS at 09:15

## 2020-01-01 RX ADMIN — Medication 10 ML: at 21:33

## 2020-01-01 RX ADMIN — PIPERACILLIN AND TAZOBACTAM 3.38 G: 3; .375 INJECTION, POWDER, LYOPHILIZED, FOR SOLUTION INTRAVENOUS at 04:50

## 2020-01-01 RX ADMIN — AZITHROMYCIN DIHYDRATE 500 MG: 500 INJECTION, POWDER, LYOPHILIZED, FOR SOLUTION INTRAVENOUS at 13:00

## 2020-01-01 RX ADMIN — SODIUM CHLORIDE 1000 ML: 9 INJECTION, SOLUTION INTRAVENOUS at 10:45

## 2020-01-01 RX ADMIN — AZITHROMYCIN DIHYDRATE 500 MG: 500 INJECTION, POWDER, LYOPHILIZED, FOR SOLUTION INTRAVENOUS at 16:04

## 2020-01-01 RX ADMIN — HEPARIN SODIUM 5000 UNITS: 5000 INJECTION INTRAVENOUS; SUBCUTANEOUS at 04:50

## 2020-01-01 RX ADMIN — AMOXICILLIN AND CLAVULANATE POTASSIUM 1 TABLET: 875; 125 TABLET, FILM COATED ORAL at 21:44

## 2020-01-01 RX ADMIN — PIPERACILLIN AND TAZOBACTAM 3.38 G: 3; .375 INJECTION, POWDER, LYOPHILIZED, FOR SOLUTION INTRAVENOUS at 21:19

## 2020-01-01 RX ADMIN — Medication 10 ML: at 21:17

## 2020-01-01 RX ADMIN — IPRATROPIUM BROMIDE AND ALBUTEROL SULFATE 3 ML: .5; 3 SOLUTION RESPIRATORY (INHALATION) at 23:56

## 2020-01-01 RX ADMIN — IPRATROPIUM BROMIDE AND ALBUTEROL SULFATE 3 ML: .5; 3 SOLUTION RESPIRATORY (INHALATION) at 07:54

## 2020-01-01 RX ADMIN — LEVOFLOXACIN 750 MG: 5 INJECTION, SOLUTION INTRAVENOUS at 23:24

## 2020-01-01 RX ADMIN — IPRATROPIUM BROMIDE AND ALBUTEROL SULFATE 3 ML: .5; 3 SOLUTION RESPIRATORY (INHALATION) at 08:51

## 2020-01-01 RX ADMIN — IPRATROPIUM BROMIDE AND ALBUTEROL SULFATE 3 ML: .5; 3 SOLUTION RESPIRATORY (INHALATION) at 04:18

## 2020-01-01 RX ADMIN — PIPERACILLIN AND TAZOBACTAM 3.38 G: 3; .375 INJECTION, POWDER, LYOPHILIZED, FOR SOLUTION INTRAVENOUS at 04:29

## 2020-01-01 RX ADMIN — INSULIN LISPRO 3 UNITS: 100 INJECTION, SOLUTION INTRAVENOUS; SUBCUTANEOUS at 17:53

## 2020-01-01 RX ADMIN — FAMOTIDINE 20 MG: 10 INJECTION INTRAVENOUS at 21:55

## 2020-01-01 RX ADMIN — PIPERACILLIN AND TAZOBACTAM 3.38 G: 3; .375 INJECTION, POWDER, LYOPHILIZED, FOR SOLUTION INTRAVENOUS at 12:50

## 2020-01-01 RX ADMIN — PIPERACILLIN AND TAZOBACTAM 3.38 G: 3; .375 INJECTION, POWDER, LYOPHILIZED, FOR SOLUTION INTRAVENOUS at 19:45

## 2020-01-01 RX ADMIN — IPRATROPIUM BROMIDE AND ALBUTEROL SULFATE 3 ML: .5; 3 SOLUTION RESPIRATORY (INHALATION) at 23:25

## 2020-01-01 RX ADMIN — AZITHROMYCIN DIHYDRATE 500 MG: 500 INJECTION, POWDER, LYOPHILIZED, FOR SOLUTION INTRAVENOUS at 17:20

## 2020-01-01 RX ADMIN — IPRATROPIUM BROMIDE AND ALBUTEROL SULFATE 3 ML: .5; 3 SOLUTION RESPIRATORY (INHALATION) at 15:29

## 2020-01-01 RX ADMIN — AMOXICILLIN AND CLAVULANATE POTASSIUM 1 TABLET: 875; 125 TABLET, FILM COATED ORAL at 08:02

## 2020-01-01 RX ADMIN — HEPARIN SODIUM 5000 UNITS: 5000 INJECTION INTRAVENOUS; SUBCUTANEOUS at 04:17

## 2020-01-01 RX ADMIN — LIDOCAINE HYDROCHLORIDE 80 MG: 20 INJECTION, SOLUTION INFILTRATION; PERINEURAL at 10:47

## 2020-01-01 RX ADMIN — HEPARIN SODIUM 5000 UNITS: 5000 INJECTION INTRAVENOUS; SUBCUTANEOUS at 15:11

## 2020-01-01 RX ADMIN — IPRATROPIUM BROMIDE AND ALBUTEROL SULFATE 3 ML: .5; 3 SOLUTION RESPIRATORY (INHALATION) at 04:47

## 2020-01-01 RX ADMIN — IPRATROPIUM BROMIDE AND ALBUTEROL SULFATE 3 ML: .5; 3 SOLUTION RESPIRATORY (INHALATION) at 16:20

## 2020-01-01 RX ADMIN — IPRATROPIUM BROMIDE AND ALBUTEROL SULFATE 3 ML: .5; 3 SOLUTION RESPIRATORY (INHALATION) at 19:28

## 2020-01-01 RX ADMIN — IPRATROPIUM BROMIDE AND ALBUTEROL SULFATE 3 ML: .5; 3 SOLUTION RESPIRATORY (INHALATION) at 19:12

## 2020-01-01 RX ADMIN — ACETAMINOPHEN 975 MG: 325 SUPPOSITORY RECTAL at 10:00

## 2020-01-01 RX ADMIN — PIPERACILLIN AND TAZOBACTAM 3.38 G: 3; .375 INJECTION, POWDER, LYOPHILIZED, FOR SOLUTION INTRAVENOUS at 12:21

## 2020-01-01 RX ADMIN — Medication 10 ML: at 05:17

## 2020-01-01 RX ADMIN — IPRATROPIUM BROMIDE AND ALBUTEROL SULFATE 3 ML: .5; 3 SOLUTION RESPIRATORY (INHALATION) at 23:05

## 2020-01-01 RX ADMIN — SODIUM CHLORIDE 1000 ML: 9 INJECTION, SOLUTION INTRAVENOUS at 13:21

## 2020-01-01 RX ADMIN — PIPERACILLIN AND TAZOBACTAM 3.38 G: 3; .375 INJECTION, POWDER, LYOPHILIZED, FOR SOLUTION INTRAVENOUS at 18:38

## 2020-01-01 RX ADMIN — POTASSIUM CHLORIDE AND SODIUM CHLORIDE: 900; 150 INJECTION, SOLUTION INTRAVENOUS at 19:55

## 2020-01-01 RX ADMIN — Medication 10 ML: at 14:52

## 2020-01-01 RX ADMIN — SODIUM CHLORIDE 1000 ML: 9 INJECTION, SOLUTION INTRAVENOUS at 11:39

## 2020-01-01 RX ADMIN — IPRATROPIUM BROMIDE AND ALBUTEROL SULFATE 3 ML: .5; 3 SOLUTION RESPIRATORY (INHALATION) at 08:34

## 2020-01-01 RX ADMIN — IPRATROPIUM BROMIDE AND ALBUTEROL SULFATE 3 ML: .5; 3 SOLUTION RESPIRATORY (INHALATION) at 08:07

## 2020-01-01 RX ADMIN — GUAIFENESIN 600 MG: 600 TABLET, EXTENDED RELEASE ORAL at 09:04

## 2020-01-01 RX ADMIN — FAMOTIDINE 20 MG: 10 INJECTION INTRAVENOUS at 21:19

## 2020-01-01 RX ADMIN — IPRATROPIUM BROMIDE AND ALBUTEROL SULFATE 3 ML: .5; 3 SOLUTION RESPIRATORY (INHALATION) at 14:44

## 2020-01-01 RX ADMIN — Medication 10 ML: at 13:42

## 2020-01-01 RX ADMIN — INSULIN LISPRO 2 UNITS: 100 INJECTION, SOLUTION INTRAVENOUS; SUBCUTANEOUS at 10:33

## 2020-01-01 RX ADMIN — LEVOFLOXACIN 750 MG: 5 INJECTION, SOLUTION INTRAVENOUS at 10:45

## 2020-01-01 RX ADMIN — Medication 10 ML: at 21:18

## 2020-01-01 RX ADMIN — PIPERACILLIN AND TAZOBACTAM 3.38 G: 3; .375 INJECTION, POWDER, LYOPHILIZED, FOR SOLUTION INTRAVENOUS at 04:03

## 2020-01-01 RX ADMIN — IPRATROPIUM BROMIDE AND ALBUTEROL SULFATE 3 ML: .5; 3 SOLUTION RESPIRATORY (INHALATION) at 07:36

## 2020-01-01 RX ADMIN — CLOPIDOGREL BISULFATE 75 MG: 75 TABLET ORAL at 09:08

## 2020-01-01 RX ADMIN — FAMOTIDINE 20 MG: 20 TABLET ORAL at 08:02

## 2020-01-01 RX ADMIN — POTASSIUM CHLORIDE, DEXTROSE MONOHYDRATE AND SODIUM CHLORIDE 75 ML/HR: 150; 5; 450 INJECTION, SOLUTION INTRAVENOUS at 16:36

## 2020-01-01 RX ADMIN — PIPERACILLIN AND TAZOBACTAM 3.38 G: 3; .375 INJECTION, POWDER, LYOPHILIZED, FOR SOLUTION INTRAVENOUS at 03:18

## 2020-01-01 RX ADMIN — ENOXAPARIN SODIUM 40 MG: 40 INJECTION SUBCUTANEOUS at 09:10

## 2020-01-01 RX ADMIN — HEPARIN SODIUM 5000 UNITS: 5000 INJECTION INTRAVENOUS; SUBCUTANEOUS at 16:17

## 2020-01-01 RX ADMIN — FAMOTIDINE 20 MG: 10 INJECTION INTRAVENOUS at 20:01

## 2020-01-01 RX ADMIN — HEPARIN SODIUM 5000 UNITS: 5000 INJECTION INTRAVENOUS; SUBCUTANEOUS at 14:54

## 2020-01-01 RX ADMIN — POTASSIUM CHLORIDE, DEXTROSE MONOHYDRATE AND SODIUM CHLORIDE 75 ML/HR: 150; 5; 450 INJECTION, SOLUTION INTRAVENOUS at 09:34

## 2020-01-01 RX ADMIN — INSULIN LISPRO 2 UNITS: 100 INJECTION, SOLUTION INTRAVENOUS; SUBCUTANEOUS at 12:47

## 2020-01-01 RX ADMIN — Medication 10 ML: at 05:19

## 2020-01-01 RX ADMIN — AZITHROMYCIN DIHYDRATE 500 MG: 500 INJECTION, POWDER, LYOPHILIZED, FOR SOLUTION INTRAVENOUS at 16:24

## 2020-01-01 RX ADMIN — Medication 10 ML: at 13:49

## 2020-01-01 RX ADMIN — MORPHINE SULFATE 4 MG: 4 INJECTION, SOLUTION INTRAMUSCULAR; INTRAVENOUS at 11:09

## 2020-01-01 RX ADMIN — IPRATROPIUM BROMIDE AND ALBUTEROL SULFATE 3 ML: .5; 3 SOLUTION RESPIRATORY (INHALATION) at 11:20

## 2020-01-01 RX ADMIN — PIPERACILLIN AND TAZOBACTAM 3.38 G: 3; .375 INJECTION, POWDER, LYOPHILIZED, FOR SOLUTION INTRAVENOUS at 05:12

## 2020-01-01 RX ADMIN — Medication 10 ML: at 05:12

## 2020-01-01 RX ADMIN — PIPERACILLIN AND TAZOBACTAM 3.38 G: 3; .375 INJECTION, POWDER, LYOPHILIZED, FOR SOLUTION INTRAVENOUS at 21:17

## 2020-01-01 RX ADMIN — FAMOTIDINE 20 MG: 10 INJECTION INTRAVENOUS at 10:33

## 2020-01-01 RX ADMIN — PIPERACILLIN AND TAZOBACTAM 3.38 G: 3; .375 INJECTION, POWDER, LYOPHILIZED, FOR SOLUTION INTRAVENOUS at 03:58

## 2020-01-01 RX ADMIN — SODIUM CHLORIDE 1000 ML: 9 INJECTION, SOLUTION INTRAVENOUS at 18:00

## 2020-01-01 RX ADMIN — HEPARIN SODIUM 5000 UNITS: 5000 INJECTION INTRAVENOUS; SUBCUTANEOUS at 03:12

## 2020-01-01 RX ADMIN — IPRATROPIUM BROMIDE AND ALBUTEROL SULFATE 3 ML: .5; 3 SOLUTION RESPIRATORY (INHALATION) at 04:43

## 2020-01-01 RX ADMIN — IPRATROPIUM BROMIDE AND ALBUTEROL SULFATE 3 ML: .5; 3 SOLUTION RESPIRATORY (INHALATION) at 16:22

## 2020-01-01 RX ADMIN — Medication 10 ML: at 21:56

## 2020-01-01 RX ADMIN — SODIUM CHLORIDE 999 ML/HR: 9 INJECTION, SOLUTION INTRAVENOUS at 18:38

## 2020-01-01 RX ADMIN — PIPERACILLIN AND TAZOBACTAM 3.38 G: 3; .375 INJECTION, POWDER, LYOPHILIZED, FOR SOLUTION INTRAVENOUS at 19:26

## 2020-01-01 RX ADMIN — SODIUM CHLORIDE: 9 INJECTION, SOLUTION INTRAVENOUS at 10:34

## 2020-01-01 RX ADMIN — INSULIN LISPRO 2 UNITS: 100 INJECTION, SOLUTION INTRAVENOUS; SUBCUTANEOUS at 05:29

## 2020-01-01 RX ADMIN — DEXTROSE AND SODIUM CHLORIDE 75 ML/HR: 5; 450 INJECTION, SOLUTION INTRAVENOUS at 19:36

## 2020-01-01 RX ADMIN — IPRATROPIUM BROMIDE AND ALBUTEROL SULFATE 3 ML: .5; 3 SOLUTION RESPIRATORY (INHALATION) at 03:50

## 2020-01-01 RX ADMIN — CEFTRIAXONE SODIUM 1 G: 1 INJECTION, POWDER, FOR SOLUTION INTRAMUSCULAR; INTRAVENOUS at 12:36

## 2020-01-01 RX ADMIN — INSULIN LISPRO 2 UNITS: 100 INJECTION, SOLUTION INTRAVENOUS; SUBCUTANEOUS at 18:39

## 2020-01-01 RX ADMIN — POTASSIUM CHLORIDE, DEXTROSE MONOHYDRATE AND SODIUM CHLORIDE 75 ML/HR: 150; 5; 450 INJECTION, SOLUTION INTRAVENOUS at 10:12

## 2020-01-01 RX ADMIN — PIPERACILLIN AND TAZOBACTAM 3.38 G: 3; .375 INJECTION, POWDER, LYOPHILIZED, FOR SOLUTION INTRAVENOUS at 13:52

## 2020-01-01 RX ADMIN — PROPOFOL 20 MG: 10 INJECTION, EMULSION INTRAVENOUS at 10:50

## 2020-01-01 RX ADMIN — IPRATROPIUM BROMIDE AND ALBUTEROL SULFATE 3 ML: .5; 3 SOLUTION RESPIRATORY (INHALATION) at 19:49

## 2020-01-01 RX ADMIN — DEXTROSE AND SODIUM CHLORIDE 125 ML/HR: 5; 450 INJECTION, SOLUTION INTRAVENOUS at 04:00

## 2020-01-01 RX ADMIN — PIPERACILLIN AND TAZOBACTAM 3.38 G: 3; .375 INJECTION, POWDER, LYOPHILIZED, FOR SOLUTION INTRAVENOUS at 12:46

## 2020-01-01 RX ADMIN — IPRATROPIUM BROMIDE AND ALBUTEROL SULFATE 3 ML: .5; 3 SOLUTION RESPIRATORY (INHALATION) at 20:45

## 2020-01-01 RX ADMIN — IPRATROPIUM BROMIDE AND ALBUTEROL SULFATE 3 ML: .5; 3 SOLUTION RESPIRATORY (INHALATION) at 16:50

## 2020-01-01 RX ADMIN — IPRATROPIUM BROMIDE AND ALBUTEROL SULFATE 3 ML: .5; 3 SOLUTION RESPIRATORY (INHALATION) at 08:30

## 2020-01-01 RX ADMIN — IPRATROPIUM BROMIDE AND ALBUTEROL SULFATE 3 ML: .5; 3 SOLUTION RESPIRATORY (INHALATION) at 19:43

## 2020-01-01 RX ADMIN — Medication 10 ML: at 13:52

## 2020-01-01 RX ADMIN — PROPOFOL 20 MG: 10 INJECTION, EMULSION INTRAVENOUS at 11:06

## 2020-01-01 RX ADMIN — SODIUM CHLORIDE 500 ML: 9 INJECTION, SOLUTION INTRAVENOUS at 09:05

## 2020-01-01 RX ADMIN — PIPERACILLIN AND TAZOBACTAM 3.38 G: 3; .375 INJECTION, POWDER, LYOPHILIZED, FOR SOLUTION INTRAVENOUS at 21:55

## 2020-01-01 RX ADMIN — INSULIN LISPRO 3 UNITS: 100 INJECTION, SOLUTION INTRAVENOUS; SUBCUTANEOUS at 12:21

## 2020-01-01 RX ADMIN — IPRATROPIUM BROMIDE AND ALBUTEROL SULFATE 3 ML: .5; 3 SOLUTION RESPIRATORY (INHALATION) at 16:38

## 2020-01-01 RX ADMIN — IPRATROPIUM BROMIDE AND ALBUTEROL SULFATE 3 ML: .5; 3 SOLUTION RESPIRATORY (INHALATION) at 03:35

## 2020-01-01 RX ADMIN — IPRATROPIUM BROMIDE AND ALBUTEROL SULFATE 3 ML: .5; 3 SOLUTION RESPIRATORY (INHALATION) at 15:30

## 2020-11-28 NOTE — ED NOTES
Report called to [facility] Primary care nurse

## 2020-11-28 NOTE — ED PROVIDER NOTES
EMERGENCY DEPARTMENT HISTORY AND PHYSICAL EXAM 
 
 
Date: 11/28/2020 Patient Name: Gerhardt Bookbinder. History of Presenting Illness Chief Complaint Patient presents with  Shortness of Breath History Provided By: Nursing Home/SNF/Rehab Center HPI: Gerhardt Bookbinder., 60 y.o. male with a past medical history significant diabetes, hypertension and hyperlipidemia presents to the ED with cc of SOB. There are no other complaints, changes, or physical findings at this time. PCP: No primary care provider on file. No current facility-administered medications on file prior to encounter. No current outpatient medications on file prior to encounter. Past History Past Medical History: 
Past Medical History:  
Diagnosis Date  Diabetes ([address]  Hypertension Past Surgical History: 
Past Surgical History:  
Procedure Laterality Date  HX CATARACT REMOVAL Family History: No family history on file. Social History: 
Social History Tobacco Use  Smoking status: Not on file Substance Use Topics  Alcohol use: Not on file  Drug use: Not on file Allergies: 
No Known Allergies Review of Systems Review of Systems Unable to perform ROS: Dementia Physical Exam  
 
Physical Exam 
Vitals signs and nursing note reviewed. Constitutional:   
   Appearance: Normal appearance. He is well-developed. HENT:  
   Head: Normocephalic. Nose: Nose normal.  
   Mouth/Throat:  
   Mouth: Mucous membranes are moist.  
Eyes:  
   Pupils: Pupils are equal, round, and reactive to light. Neck: Musculoskeletal: Normal range of motion. Cardiovascular:  
   Rate and Rhythm: Normal rate. Pulmonary:  
   Effort: Pulmonary effort is normal.  
Abdominal:  
   General: Abdomen is flat. Musculoskeletal: Normal range of motion. General: Tenderness present. Comments: Fetal position Skin: 
   General: Skin is warm.   
Neurological: General: No focal deficit present. Mental Status: He is alert. He is disoriented. Psychiatric:     
   Mood and Affect: Mood normal.  
 
 
 
Lab and Diagnostic Study Results Labs - No results found for this or any previous visit (from the past 12 hour(s)). Radiologic Studies -  
@lastxrresult@ CT Results  (Last 48 hours) None CXR Results  (Last 48 hours) None Medical Decision Making - I am the first provider for this patient. - I reviewed the vital signs, available nursing notes, past medical history, past surgical history, family history and social history. - Initial assessment performed. The patients presenting problems have been discussed, and they are in agreement with the care plan formulated and outlined with them. I have encouraged them to ask questions as they arise throughout their visit. Vital Signs-Reviewed the patient's vital signs. Patient Vitals for the past 12 hrs: 
 Temp Pulse Resp BP SpO2  
11/28/20 1030 98.9 °F (37.2 °C) 90 19 93/61 96 % Records Reviewed: Nursing Notes The patient presents with altered mental status with a differential diagnosis of  chronic dementia, CVA, CVA/TIA, encephalopathy, epidural hematoma, hypernatremia, hyponatremia, hypoxia, insulin reaction, ICB, UTI and volume depletion ED Course:  
 
  
 
Provider Notes (Medical Decision Making): MDM Procedures Medical Decision Makingedical Decision Making Performed by: Farzana Cat MD 
[facility] Date/Time: 11/28/2020 7:32 PM 
Performed by: Ino Urrutia MD 
Authorized by: Ino Urrutia MD  
 
ECG reviewed by ED Physician in the absence of a cardiologist: yes Previous ECG:  
  Previous ECG:  Unavailable Interpretation: Interpretation: normal   
Rate:  
  ECG rate:  87 ECG rate assessment: normal   
Rhythm:  
  Rhythm: sinus rhythm Ectopy:  
  Ectopy: none QRS:  
  QRS axis:  Normal 
  QRS intervals:  Normal Conduction:  
  Conduction: normal   
ST segments: ST segments:  Normal 
T waves:  
  T waves: normal   
 
  
 
 
Disposition Disposition: Condition improved Back to nursing home DISCHARGE PLAN: 
1. There are no discharge medications for this patient. 2.  
Follow-up Information None F/U Primary Care Doctor on Monday or 3. Return to ED if worse 4. There are no discharge medications for this patient. Diagnosis Clinical Impression: 1. resolved SOB 2. Dementia Attestations: 
 
Renay Walker MD 
 
Please note that this dictation was completed with Twibingo, the computer voice recognition software. Quite often unanticipated grammatical, syntax, homophones, and other interpretive errors are inadvertently transcribed by the computer software. Please disregard these errors. Please excuse any errors that have escaped final proofreading. Thank you.

## 2020-11-28 NOTE — ED TRIAGE NOTES
Pt is a resident of [facility]. PT sent over for reports of respiratory distress. Pt presents with o2 sat of 96% on room air. No labored breathing noted.

## 2020-12-01 PROBLEM — R09.02 HYPOXIA: Status: ACTIVE | Noted: 2020-01-01

## 2020-12-01 PROBLEM — N17.9 AKI (ACUTE KIDNEY INJURY) (HCC): Status: ACTIVE | Noted: 2020-01-01

## 2020-12-01 PROBLEM — J18.9 PNA (PNEUMONIA): Status: ACTIVE | Noted: 2020-01-01

## 2020-12-01 NOTE — ROUTINE PROCESS
Pt received from the ed, via stretcher. Report received from Milka Hatfield.  Pt disoriented. Skin warm and dry, blanchable redness to left hip, left lateral foot, and ankle. Blanchable redness also noted to right ankle and er nurse reported stage 1 to right heel. # 20 to right forearm infusing azithromycin, # 22 left forearm. Droplet precautions in progress 2/2 r/o covid. Call placed to Pt's daughter to review admission orders. Call placed to [facility] to obtain recent med list, but pt taken out of the system. Pending information from [facility]. Spoke [employer] pharmacist, reviewed medications, med list complete. Pt with hypotension, Np. Martita informed, ordered a saline bolus @ 999 cc/hr. Respirations even and unlabored, o2 intact via n/c @ 3 liters. Took mucinex crucshed in apple ssauce, scd's intact to b/l lefts. Benjamin intact, draining small amount of sepideh urine. No s/s of pain noted.

## 2020-12-01 NOTE — ROUTINE PROCESS
TRANSFER - OUT REPORT: 
 
Verbal report given to Zo RN(name) on [facility].  being transferred to Acute Care rm 223(unit) for routine progression of care Report consisted of patients Situation, Background, Assessment and  
Recommendations(SBAR). Information from the following report(s) ED Summary was reviewed with the receiving nurse. Opportunity for questions and clarification was provided.    
 
Patient transported with: 
 Registered Nurse on O2

## 2020-12-01 NOTE — ED TRIAGE NOTES
Pt was sent form [facility] F 935 to rule out pneumonia. Pt has a wet sounding coughing and low BP. UTO pulse ox at this time due to increased cap refill.

## 2020-12-01 NOTE — ED PROVIDER NOTES
EMERGENCY DEPARTMENT HISTORY AND PHYSICAL EXAM 
 
 
Date: 12/1/2020 Patient Name: Lisa Armstrong. History of Presenting Illness Chief Complaint Patient presents with  Cough History Provided By: Nursing Home/SNF/Rehab Center HPI: Lisa Morales, 60 y.o. male with a past medical history significant diabetes, hypertension and stroke presents to the ED with cc of cough noticed today; patient not able to verbalize anything else There are no other complaints, changes, or physical findings at this time. PCP: Ludivina Murillo MD 
 
Current Facility-Administered Medications Medication Dose Route Frequency Provider Last Rate Last Dose  sodium chloride 0.9 % bolus infusion 1,000 mL  1,000 mL IntraVENous ONCE Alicja Ma MD      
 
 
Past History Past Medical History: 
Past Medical History:  
Diagnosis Date  Diabetes ([facility] [address]  Hypertension Past Surgical History: 
Past Surgical History:  
Procedure Laterality Date  HX CATARACT REMOVAL Family History: No family history on file. Social History: 
Social History Tobacco Use  Smoking status: Not on file Substance Use Topics  Alcohol use: Not on file  Drug use: Not on file Allergies: 
No Known Allergies Review of Systems Review of Systems Constitutional: Negative for chills and fever. HENT: Negative for rhinorrhea and sore throat. Eyes: Negative for discharge and visual disturbance. Respiratory: Positive for cough and wheezing. Negative for shortness of breath. Cardiovascular: Negative for chest pain and leg swelling. Gastrointestinal: Negative for abdominal pain and vomiting. Genitourinary: Positive for decreased urine volume and enuresis. Musculoskeletal: Negative for back pain and myalgias. Skin: Negative for color change and pallor. Neurological: Negative for weakness and headaches. Psychiatric/Behavioral: Negative.    
 
 
Physical Exam 
 
Physical Exam 
Vitals signs and nursing note reviewed.  
Constitutional:   
   Appearance: He is ill-appearing.  
HENT:  
   Head: Normocephalic and atraumatic.  
   Nose: Nose normal.  
   Mouth/Throat:  
   Mouth: Mucous membranes are moist.  
   Pharynx: Oropharynx is clear.  
Eyes:  
   Extraocular Movements: Extraocular movements intact.  
   Conjunctiva/sclera: Conjunctivae normal.  
   Pupils: Pupils are unequal.  
Neck:  
   Musculoskeletal: Normal range of motion and neck supple.  
Cardiovascular:  
   Rate and Rhythm: Normal rate and regular rhythm.  
   Pulses: Decreased pulses.     
     Radial pulses are 1+ on the right side and 1+ on the left side.  
     Femoral pulses are 1+ on the right side and 1+ on the left side. 
   Heart sounds: Normal heart sounds.  
Pulmonary:  
   Breath sounds: Decreased air movement present. Decreased breath sounds and wheezing present.  
Abdominal:  
   General: Bowel sounds are decreased.  
   Palpations: Abdomen is soft.  
   Tenderness: There is no abdominal tenderness.  
Musculoskeletal:  
   Comments: Contracted in both upper and lower extremities  
Skin: 
   General: Skin is cool and dry.  
   Capillary Refill: Capillary refill takes more than 3 seconds.  
Neurological:  
   Mental Status: Mental status is at baseline.  
   Motor: Abnormal muscle tone present.  
Psychiatric:     
   Mood and Affect: Mood normal.     
   Behavior: Behavior normal.  
 
 
 
Lab and Diagnostic Study Results  
 
Labs - 
 No results found for this or any previous visit (from the past 12 hour(s)). 
 
Radiologic Studies -  
[unfilled] 
CT Results  (Last 48 hours)  
 None  
  
 
CXR Results  (Last 48 hours)  
 None  
  
 
 
Medical Decision Making and ED Course  
- I am the first and primary provider for this patient. 
 
- I reviewed the vital signs, available nursing notes, past medical history, past surgical history, family history and social history. 
 
- Initial assessment  performed. The patients presenting problems have been discussed, and the staff are in agreement with the care plan formulated and outlined with them. I have encouraged them to ask questions as they arise throughout their visit. Vital Signs-Reviewed the patient's vital signs. Patient Vitals for the past 12 hrs: 
 Temp Pulse 12/01/20 0937 97.9 °F (36.6 °C) 92 Records Reviewed: Nursing Notes The patient presents with cough with a differential diagnosis of URI, pneumonia, influenza ED Course:  
 
 
ED Course as of Dec 01 1208 Tue Dec 01, 2020  
1147 Informed by respiratory that the ABG machine is out of service for now because it needs to be recalibrated and it would not be up and running for another 2 hours [SB] ED Course User Index 
[SB] Andi Azevedo MD  
 
 
 
Provider Notes (Medical Decision Making): MDM Consultations:  
 
 
Consultations: - NONE Procedures and Critical Care Performed by: Mikey Damon MD 
PROCEDURES: 
Procedures CRITICAL CARE NOTE : 
9:54 AM 
Amount of Critical Care Time: 33(minutes) IMPENDING DETERIORATION -Respiratory and Cardiovascular ASSOCIATED RISK FACTORS - Hypotension, Shock, Hypoxia and Dehydration MANAGEMENT- Bedside Assessment INTERPRETATION -  Xrays, Blood Gases, Blood Pressure and Cardiac Output Measures INTERVENTIONS - hemodynamic mngmt and Metobolic interventions CASE REVIEW - Me 
TREATMENT RESPONSE -Improved and Stable PERFORMED BY -ME 
 
NOTES   : 
I have spent critical care time involved in lab review, consultations with specialist, family decision- making, bedside attention and documentation. This time excludes time spent in any separate billed procedures. During this entire length of time I was immediately available to the patient . Mikey Damon MD 
 
 
 
Disposition Disposition: Condition stable and improved Admitted to respiratory isolation unit the case was discussed with the admitting physician Dr. Dolores Lee Remove if not discharged DISCHARGE PLAN: 
1. There are no discharge medications for this patient. 2.  
Follow-up Information None 3. Return to ED if worse 4. There are no discharge medications for this patient. Diagnosis Clinical Impression: No diagnosis found. Attestations: 
 
Greg Chacko MD 
 
Please note that this dictation was completed with RNDOMN, the computer voice recognition software. Quite often unanticipated grammatical, syntax, homophones, and other interpretive errors are inadvertently transcribed by the computer software. Please disregard these errors. Please excuse any errors that have escaped final proofreading. Thank you.

## 2020-12-01 NOTE — H&P
History and Physical 
 
Patient: Liberty Sandoval MRN: [id number]  SSN: [id number] YOB: 1955  Age: 60 y.o. Sex: male Subjective: Liberty Sandoval is a 60 y.o. bed bound, minimally verbal male resident of [facility] facility, with PMH of hypertension, diabetes, glaucoma w blindness, and prior hx CVA who presents to ED for evaluation of cough. The patient is unable to provide HPI 2/2 to above mentioned factors, and so medical history gathered from sister Lexii Hernandez. She reports the nursing home called her to say the Speech Therapist indicated he was aspirating \"everything\" and \"coughing\" during mealtimes and at other times, and there was concern for pneumonia. In ED, pt afebrile, but hypotensive without tachycardia. Reportedly hypoxic on arrival and placed on 3L O2, with saturations 95%. CXR shows RLL infiltrate, and Lactic acid level elevated at 3.0. He was treated with IV Azithromycin and Rocephin and referred for admission. Past Medical History:  
Diagnosis Date  CVA (cerebral vascular accident) [address] [address]  Diabetes ([facility] [address]  Glaucoma  Hypertension Past Surgical History:  
Procedure Laterality Date  HX CATARACT REMOVAL History reviewed. No pertinent family history. Social History Tobacco Use  Smoking status: Former Smoker  Smokeless tobacco: Never Used Substance Use Topics  Alcohol use: Not Currently Prior to Admission medications Medication Sig Start Date End Date Taking? Authorizing Provider  
atorvastatin (LIPITOR) 40 mg tablet Take 40 mg by mouth nightly. Yes Other, MD Rohan  
clopidogreL (PLAVIX) 75 mg tab Take 75 mg by mouth daily. Yes Other, MD Rohan  
lisinopriL (PRINIVIL, ZESTRIL) 10 mg tablet Take 10 mg by mouth daily. Yes Other, MD Rohan  
pantoprazole (PROTONIX) 40 mg tablet Take 40 mg by mouth daily. Yes Other, MD Rohan  
insulin lispro (HUMALOG) 100 unit/mL kwikpen by SubCUTAneous route.  Sliding andria   Yes Other, MD Rohan  
  
 
No Known Allergies Review of Systems: 
Review of systems not obtained due to patient factors. Objective:  
 
Vitals:  
 12/01/20 1330 12/01/20 1400 12/01/20 1530 12/01/20 1638 BP: 103/74 116/73 104/70 Pulse: 92 92 90 Resp: 20 19 19 Temp:      
SpO2:   100% 95% Physical Exam: 
GENERAL: alert, ill appearing AAM 
LUNG: clear to auscultation bilaterally, rhonchi scattered throughout, diminished RLL HEART: regular rate and rhythm, S1, S2 normal, no murmur, click, rub or gallop ABDOMEN: soft, non-tender. Bowel sounds normal. No masses,  no organomegaly EXTREMITIES:  Contractures BUE/BLE SKIN: no rash or abnormalities NEUROLOGIC: Alert, non-verbal, bed bound Assessment:  
 
Hospital Problems  Never Reviewed Codes Class Noted POA Hypoxia ICD-10-CM: R09.02 
ICD-9-CM: 799.02  12/1/2020 Unknown * (Principal) PNA (pneumonia) ICD-10-CM: J18.9 ICD-9-CM: 613  12/1/2020 Unknown NELSON (acute kidney injury) ([facility] [address] ICD-10-CM: N17.9 ICD-9-CM: 584.9  12/1/2020 Unknown Plan:  
Admit to Inpatient DX: RLL Pneumonia ELOS 3-4MN Activity Bed rest 
Diet NPO Reconcile home meds VTE ppx: [facility] Medical surrogate is sister Carolyn Guerra [phone] Pt is a DNR/DNI Pneumonia - CXR w RLL infiltrate, +cough, +hypoxia - Likely s/t aspiration 
- NPO 
- ST consult - IV ABX Zosyn + Azithromycin - BLD CX x 2 sent - follow results Covid PUI 
- Observe Droplet Plus Precautions - Covid PCR pending NELSON 
- BUN Cr 
- IVF D51/2NS @ 75ml/hr - Repeat BMP in am 
 
Diabetes - Accuchecks Q6H 
- SSI coverage if needed CVA 
- Prior hx of CVA 
- Discussed w sister likelihood of another event, given acute onset of dysphagia, and at this time, she agrees no need for CT if no change in management to occur. Dysphagia 
- Consult to GI 
- Aspiration precautions Hypertension - Hypotensive upon arrival 
- Hold anti-hypertensives 
- IVF as ordered - Monitor BP 
 Elevated Lactic Acid - Likely s/t increased tissue demand - Treat underlying PNA 
- Supplemental O2 
- IVF as above ? UTI 
- 2+ bacteria, but also moderate epi cells - Send UA CX - follow - Likely pathogens covered by Zosyn Total time spent on consultation and coordination of care - 50 mins Signed By: Lore Tejada NP December 1, 2020

## 2020-12-01 NOTE — PROGRESS NOTES
Problem: Patient Education: Go to Patient Education Activity Goal: Patient/Family Education Outcome: Progressing Towards Goal 
  
Problem: Pressure Injury - Risk of 
Goal: *Prevention of pressure injury Description: Document Steve Scale and appropriate interventions in the flowsheet. Outcome: Progressing Towards Goal 
Note: Pressure Injury Interventions: 
Sensory Interventions: Float heels, Keep linens dry and wrinkle-free Moisture Interventions: Check for incontinence Q2 hours and as needed Mobility Interventions: Float heels

## 2020-12-02 NOTE — PROGRESS NOTES
Pt with overdue lactic acid, lactic acid was drawn by this writer and taken down to lab for processing,  Cinthya in lab states she is unable to run lab without order, active PRN lactic acid lab order is currently in place with [id number] remaining occurences however a new order was placed by this writer per protocol  in an attempt to resolve issue , Cinthya in lab says she is still unable to run lab without order, this writer has no other resources available at this time to aid in having lactic acid processed, will report to day nurse and continue to monitor pt closely

## 2020-12-02 NOTE — H&P (VIEW-ONLY)
Gastroenterology Consult      Patient: Fallon Suarez MRN: [id number]  SSN: [id number]    YOB: 1955  Age: 60 y.o. Sex: male        Assessment:     1. Dysphagia  -Patient to be oral pharyngeal dysphagia and possibly secondary to extension of the previous CVA. 2.  Aspiration pneumonia  3. Suspected sepsis  4. Acute kidney insufficiency  5. Urinary tract infection  6. History of CVA  7. Diabetes mellitus type 2  8. Dementia  Plan:     1. I discussed the risks and benefits of insertion of a PEG tube with the patient's sister, Larissa Mcnally, who states she wished to have the PEG tube inserted. Schedule for insertion of the PEG tube on tomorrow, 12/3/2020. She was informed that it may have to be delayed if patient is not stable concerning for sepsis or hypotension. He will be reevaluated in the morning. Subjective:     Reason for consultation: Evaluation for PEG tube insertion    History:   61-year-old male with history of hypertensive atherosclerotic cardiovascular disease, status post CVA, diabetes mellitus type 2, glaucoma with blindness who is a resident of a local nursing home who presented to the emergency room with a persistent cough and history of aspiration oral intake. He is unable to give any history the patient apparently had work-up at the nursing home by speech therapy who was noted that he aspirated whenever he would take oral intake due to concern about pneumonia. Patient was brought to the emergency room where he was found to be hypotensive, hypoxic, and chest x-ray showing a right lower lobe infiltrate. Patient was also found to have a urinary tract infection and evidence of acute renal failure. After talking with the patient's sister she states they decided that the patient needed to have a PEG tube inserted to prevent this recurrent aspiration. She wanted the procedure done while he was hospitalized here.     Hospital Problems  Never Reviewed          Codes Class Noted POA    Hypoxia ICD-10-CM: R09.02  ICD-9-CM: 799.02  12/1/2020 Unknown        * (Principal) PNA (pneumonia) ICD-10-CM: J18.9  ICD-9-CM: 486  12/1/2020 Unknown        NELSON (acute kidney injury) ([facility] ICD-10-CM: N17.9  ICD-9-CM: 584.9  12/1/2020 Unknown            Past Medical History:   Diagnosis Date    [facility]ce[facility] vascular accident) ([facility]     Diabetes ([facility]     Glaucoma     Hypertension      Past Surgical History:   Procedure Laterality Date    HX CATARACT REMOVAL        History reviewed. No pertinent family history.   Social History     Tobacco Use    Smoking status: Former Smoker    Smokeless tobacco: Never Used   Substance Use Topics    Alcohol use: Not Currently      Current Facility-Administered Medications   Medication Dose Route Frequency Provider Last Rate Last Dose    famotidine (PF) (PEPCID) 20 mg in 0.9% sodium chloride 10 mL injection  20 mg IntraVENous Q12H Conner David MD        azithromycin (ZITHROMAX) 500 mg in 0.9% sodium chloride 250 mL (VIAL-MATE)  500 mg IntraVENous Q24H Conner David MD   500 mg at 12/02/20 1604    sodium chloride 0.9 % bolus infusion 1,000 mL  1,000 mL IntraVENous ONCE Conner David  mL/hr at 12/02/20 1800 1,000 mL at 12/02/20 1800    0.9% sodium chloride with KCl 20 mEq/L infusion   IntraVENous CONTINUOUS Conner David MD        piperacillin-tazobactam (ZOSYN) 3.375 g in 0.9% sodium chloride (MBP/ADV) 100 mL MBP  3.375 g IntraVENous Q8H Conner David MD 25 mL/hr at 12/02/20 1352 3.375 g at 12/02/20 1352    albuterol-ipratropium (DUO-NEB) 2.5 MG-0.5 MG/3 ML  3 mL Nebulization Q4H RT Conner David MD   3 mL at 12/02/20 1650    glucose chewable tablet 16 g  4 Tab Oral PRN Juan David MD        dextrose (D50W) injection syrg 12.5-25 g  25-50 mL IntraVENous PRN Conner David MD        glucagon (GLUCAGEN) injection 1 mg  1 mg IntraMUSCular PRN Juan David MD        insulin lispro (HUMALOG) injection   SubCUTAneous AC&HS Elle Sorto MD   Stopped at 12/02/20 1630    sodium chloride (NS) flush 5-40 mL  5-40 mL IntraVENous Q8H Conner David MD   10 mL at 12/02/20 1352    sodium chloride (NS) flush 5-40 mL  5-40 mL IntraVENous PRN Yesica David MD        acetaminophen (TYLENOL) tablet 650 mg  650 mg Oral Q6H PRN Yesica David MD        Or    acetaminophen (TYLENOL) suppository 650 mg  650 mg Rectal Q6H PRN Conner David MD        polyethylene glycol (MIRALAX) packet 17 g  17 g Oral DAILY PRN Conner David MD        ondansetron (ZOFRAN) injection 4 mg  4 mg IntraVENous Q6H PRN Conner David MD        heparin (porcine) injection 5,000 Units  5,000 Units SubCUTAneous Q12H Elle Sorto MD   5,000 Units at 12/02/20 1511        No Known Allergies    Review of Systems:  Unable to give any history secondary to dementia and aphasia. All history was obtained from patient's chart and with brief discussion with his sister      Objective:     Vitals:    12/02/20 1406 12/02/20 1537 12/02/20 1600 12/02/20 1650   BP: (!) 91/54 (!) 90/58     Pulse: 96 92 92    Resp:  17     Temp:  97.1 °F (36.2 °C)     SpO2:  90%  98%   Weight:       Height:            Physical Exam:  General: Alert, cooperative, no distress. Head:  Normocephalic, without obvious abnormality, atraumatic. Eyes:  Conjunctivae/corneas clear. Pupils equal, round, reactive to light. Extraocular movements intact. Lungs: Scattered rhonchi  Chest wall: No tenderness or deformity. Heart:  Regular rate and rhythm, S1, S2 normal, no murmur, click, rub, or gallop. Abdomen:  Soft, non-tender. Bowel sounds normal. No masses. No organomegaly. Extremities: Extremities normal, atraumatic, no cyanosis or edema. Pulses: 2+ and symmetric all extremities. Skin: Skin color, texture, turgor normal. No rashes or lesions.   Lymph nodes: Cervical, supraclavicular, and axillary nodes normal.  Neurologic: CNII-XII intact. Normal strength, sensation, and reflexes throughout. CBC w/Diff Recent Labs     12/02/20  0538 12/01/20  1025   WBC 10.3 10.6   RBC 3.34* 4.00*   HGB 9.8* 11.7*   HCT 30.1* 36.2*    453   GRANS 80* 83*   LYMPH 13* 11*   EOS 0* 0*        Chemistry Recent Labs     12/02/20  0538 12/01/20  1025   * 119*   * 146*   K 3.7 4.6   * 111*   CO2 22 22   BUN 42* 45*   CREA 1.59* 2.15*   CA 8.2* 9.1   MG 2.1 2.4   AGAP 14 13   BUCR 26* 21*   AP  --  101   TP  --  8.1   ALB  --  2.7*   GLOB  --  5.4*   AGRAT  --  0.5*        Lactic Acid Lactic acid   Date Value Ref Range Status   12/02/2020 3.0 (HH) 0.4 - 2.0 mmol/L Final     Recent Labs     12/02/20  1500 12/02/20  1217 12/02/20  0915   LAC 3.0* 2.6* 3.7*        Micro  Recent Labs     12/01/20  1300   CULT Gram Negative Rods*     Recent Labs     12/01/20  1300   CULT Gram Negative Rods*        Liver Enzymes Protein, total   Date Value Ref Range Status   12/01/2020 8.1 6.4 - 8.2 g/dL Final     Albumin   Date Value Ref Range Status   12/01/2020 2.7 (L) 3.5 - 5.0 g/dL Final     Globulin   Date Value Ref Range Status   12/01/2020 5.4 (H) 2.0 - 4.0 g/dL Final     A-G Ratio   Date Value Ref Range Status   12/01/2020 0.5 (L) 1.1 - 2.2   Final     Alk.  phosphatase   Date Value Ref Range Status   12/01/2020 101 45 - 117 U/L Final     Recent Labs     12/01/20  1025   TP 8.1   ALB 2.7*   GLOB 5.4*   AGRAT 0.5*             Cardiac Enzymes No results found for: CPK, CK, CKMMB, CKMB, RCK3, CKMBT, CKNDX, CKND1, GERARDO, TROPT, TROIQ, ZULY, TROPT, TNIPOC, BNP, BNPP     BNP No results found for: BNP, BNPP, XBNPT     Coagulation Recent Labs     12/01/20  1025   PTP 10.8   INR 1.1         Thyroid  No results found for: T4, T3U, TSH, TSHEXT       Lipid Panel No results found for: CHOL, CHOLPOCT, CHOLX, CHLST, CHOLV, 248751, HDL, HDLP, LDL, LDLC, DLDLP, 514123, VLDLC, VLDL, TGLX, TRIGL, TRIGP, TGLPOCT, CHHD, CHHDX     Urinalysis Lab Results   Component Value Date/Time    Color Yellow/Straw 12/01/2020 01:00 PM    Appearance COULDY 12/01/2020 01:00 PM    Specific gravity 1.015 12/01/2020 01:00 PM    pH (UA) 8.5 (H) 12/01/2020 01:00 PM    Protein 30 (A) 12/01/2020 01:00 PM    Glucose Negative 12/01/2020 01:00 PM    Ketone Negative 12/01/2020 01:00 PM    Urobilinogen 1.0 12/01/2020 01:00 PM    Nitrites Negative 12/01/2020 01:00 PM    Leukocyte Esterase Large (A) 12/01/2020 01:00 PM    Epithelial cells Moderate 12/01/2020 01:00 PM    Bacteria 2+ (A) 12/01/2020 01:00 PM    WBC 20-50 12/01/2020 01:00 PM    RBC 0-5 12/01/2020 01:00 PM        XR (Most Recent). CXR reviewed by me and compared with previous CXR Results from Hospital Encounter encounter on 12/01/20   XR CHEST PORT    Narrative Hypoxic. Comparison chest x-ray 11/28/2020. FINDINGS: Frontal single view chest. Normal heart size. No vascular congestion  or pulmonary edema. Interval development of right lower lobe consolidative  infiltrate. No effusion or pneumothorax. Chronic deformities of bilateral  shoulders, AC joints. No free air under the diaphragm. Impression IMPRESSION:  1. Interval development right lower lobe consolidative infiltrate. CT (Most Recent) No results found for this or any previous visit.            Roshan Arevalo MD  12/2/2020  6:46 PM.

## 2020-12-02 NOTE — PROGRESS NOTES
Patient lactic acid once again on upward trend and currently is at 3 we will change IV fluids and give 1 L normal saline bolus and continue with normal saline with 20 mEq oral KCl at 150 cc an hour and follow repeat lactic in a.m. tomorrow        Patient had rapid in-house Covid screening performed and resulted as negative

## 2020-12-02 NOTE — ROUTINE PROCESS
Dr. Ekta Santizo made aware lactic acid 3.0 will changed iv fluids repeat lactic in a.m 12/03/2020 at 0400.

## 2020-12-02 NOTE — PROGRESS NOTES
Per Cinthya in lab, pt's lactic acid is 3.5 however results can't be input into the system d/t order not populating down in lab and label not being able to be printed out, this writer will report this system to day nurse and management in the morning

## 2020-12-02 NOTE — PROGRESS NOTES
Progress Note  Date:12/2/2020       Room:223/01  Patient Miryam Acosta. YOB: 1955     Age:65 y.o. Subjective    Yuliya Buckley. is a 60 y.o. bed bound, minimally verbal male resident of [facility] facility, with PMH of hypertension, diabetes, glaucoma w blindness, and prior hx CVA who presents to ED for evaluation of cough. The patient is unable to provide HPI 2/2 to above mentioned factors, and so medical history gathered from sister Kapil Sees. She reports the nursing home called her to say the Speech Therapist indicated he was aspirating \"everything\" and \"coughing\" during mealtimes and at other times, and there was concern for pneumonia. In ED, pt afebrile, but hypotensive without tachycardia. Reportedly hypoxic on arrival and placed on 3L O2, with saturations 95%. CXR shows RLL infiltrate, and Lactic acid level elevated at 3.0. He was treated with IV Azithromycin and Rocephin and referred for admission. Patient seen and evaluated resting in bed minimally responsive not comprehendible body same. Patient evaluated by speech therapy at nursing home and indicated that he was aspirating everything and plan was for possible PEG tube. Patient has improved O2 saturation and is requiring only 1 L. But continues to be hypotensive at 85/58 slightly improved after 500 cc bolus. Lactic acid still elevated at 2.6 but on downward trend. Currently is afebrile       Review of Systems   Reason unable to perform ROS: Altered mental state.        Objective           Vitals Last 24 Hours:  Patient Vitals for the past 24 hrs:   Temp Pulse Resp BP SpO2   12/02/20 1406  96  (!) 91/54    12/02/20 1156  94      12/02/20 1122     100 %   12/02/20 1121 97 °F (36.1 °C) 91 18 (!) 87/53 100 %   12/02/20 0803 96.9 °F (36.1 °C) 91 18 (!) 85/58 98 %   12/02/20 0800  91      12/02/20 0756     100 %   12/02/20 0447     96 %   12/02/20 0136 97.2 °F (36.2 °C) 89 20 (!) 91/49 96 % 12/01/20 2305     96 %   12/01/20 1912 (!) 96.5 °F (35.8 °C) 94 18 (!) 96/47 96 %   12/01/20 1911     96 %   12/01/20 1734 97 °F (36.1 °C) 93 18 (!) 83/51 96 %   12/01/20 1638     95 %   12/01/20 1530  90 19 104/70 100 %        I/O (24Hr): Intake/Output Summary (Last 24 hours) at 12/2/2020 1508  Last data filed at 12/2/2020 1401  Gross per 24 hour   Intake 3423 ml   Output 750 ml   Net 2673 ml       Physical Exam:  General: Normally responsive no acute distress  Head:  Normocephalic, without obvious abnormality, atraumatic. Eyes:  Conjunctivae/corneas clear. Pupils equal, round, reactive to light. Extraocular movements intact. Lungs: Bilateral air entry diminished at the bases but no wheezes rales rhonchi  Chest wall: No tenderness or deformity. Heart:  Regular rate and rhythm, S1, S2 normal, no murmur, click, rub or gallop. Abdomen:  Soft, non-tender. Bowel sounds normal. No masses,  No organomegaly. Extremities: Extremities normal, atraumatic, no cyanosis or edema. Pulses: 2+ and symmetric all extremities.   Skin: Skin color, texture, turgor normal. No rashes or lesions  Neurologic: Disoriented minimally responsive    Medications           Current Facility-Administered Medications   Medication Dose Route Frequency    dextrose 5 % - 0.45% NaCl infusion  125 mL/hr IntraVENous CONTINUOUS    piperacillin-tazobactam (ZOSYN) 3.375 g in 0.9% sodium chloride (MBP/ADV) 100 mL MBP  3.375 g IntraVENous Q8H    albuterol-ipratropium (DUO-NEB) 2.5 MG-0.5 MG/3 ML  3 mL Nebulization Q4H RT    glucose chewable tablet 16 g  4 Tab Oral PRN    dextrose (D50W) injection syrg 12.5-25 g  25-50 mL IntraVENous PRN    glucagon (GLUCAGEN) injection 1 mg  1 mg IntraMUSCular PRN    insulin lispro (HUMALOG) injection   SubCUTAneous AC&HS    sodium chloride (NS) flush 5-40 mL  5-40 mL IntraVENous Q8H    sodium chloride (NS) flush 5-40 mL  5-40 mL IntraVENous PRN    acetaminophen (TYLENOL) tablet 650 mg  650 mg Oral Q6H PRN    Or    acetaminophen (TYLENOL) suppository 650 mg  650 mg Rectal Q6H PRN    polyethylene glycol (MIRALAX) packet 17 g  17 g Oral DAILY PRN    ondansetron (ZOFRAN) injection 4 mg  4 mg IntraVENous Q6H PRN    heparin (porcine) injection 5,000 Units  5,000 Units SubCUTAneous Q12H         Allergies         Patient has no known allergies. Labs/Imaging/Diagnostics      Labs:  Recent Results (from the past 48 hour(s))   EKG, 12 LEAD, INITIAL    Collection Time: 12/01/20  9:35 AM   Result Value Ref Range    Ventricular Rate 91 BPM    Atrial Rate 91 BPM    P-R Interval 116 ms    QRS Duration 79 ms    Q-T Interval 371 ms    QTC Calculation (Bezet) 457 ms    Calculated P Axis 77 degrees    Calculated R Axis 77 degrees    Calculated T Axis 77 degrees    Diagnosis       Sinus rhythm  Borderline low voltage, extremity leads  Probable anteroseptal infarct, old    No significant change was found      Confirmed by Jose Betancur ([id number]) on 12/1/2020 5:58:40 PM     LACTIC ACID    Collection Time: 12/01/20 10:25 AM   Result Value Ref Range    Lactic acid 4.1 (HH) 0.4 - 2.0 mmol/L   CBC WITH AUTOMATED DIFF    Collection Time: 12/01/20 10:25 AM   Result Value Ref Range    WBC 10.6 4.4 - 11.3 K/uL    RBC 4.00 (L) 4.50 - 5.90 M/uL    HGB 11.7 (L) 13.5 - 17.5 g/dL    HCT 36.2 (L) 41 - 53 %    MCV 90.5 80 - 100 FL    MCH 29.4 (L) 31 - 34 PG    MCHC 32.4 31.0 - 36.0 g/dL    RDW 16.2 (H) 11.5 - 14.5 %    PLATELET 737 K/uL    MPV 7.7 6.5 - 11.5 FL    NRBC 0.0  WBC    ABSOLUTE NRBC 0.00 K/uL    NEUTROPHILS 83 (H) 42 - 75 %    LYMPHOCYTES 11 (L) 20.5 - 51.1 %    MONOCYTES 6 1.7 - 9.3 %    EOSINOPHILS 0 (L) 0.9 - 2.9 %    BASOPHILS 0 0.0 - 2.5 %    ABS. NEUTROPHILS 8.8 (H) 1.8 - 7.7 K/UL    ABS. LYMPHOCYTES 1.1 1.0 - 4.8 K/UL    ABS. MONOCYTES 0.6 0.2 - 2.4 K/UL    ABS. EOSINOPHILS 0.0 0.0 - 0.7 K/UL    ABS.  BASOPHILS 0.0 0.0 - 0.2 K/UL   PROTHROMBIN TIME + INR    Collection Time: 12/01/20 10:25 AM   Result Value Ref Range    Prothrombin time 10.8 9.0 - 11.1 sec    INR 1.1 0.9 - 1.1     METABOLIC PANEL, COMPREHENSIVE    Collection Time: 12/01/20 10:25 AM   Result Value Ref Range    Sodium 146 (H) 136 - 145 mmol/L    Potassium 4.6 3.5 - 5.1 mmol/L    Chloride 111 (H) 97 - 108 mmol/L    CO2 22 21 - 32 mmol/L    Anion gap 13 5 - 15 mmol/L    Glucose 119 (H) 65 - 100 mg/dL    BUN 45 (H) 6 - 20 mg/dL    Creatinine 2.15 (H) 0.70 - 1.30 mg/dL    BUN/Creatinine ratio 21 (H) 12 - 20      GFR est AA 38 (L) >60 ml/min/1.73m2    GFR est non-AA 31 (L) >60 ml/min/1.73m2    Calcium 9.1 8.5 - 10.1 mg/dL    Bilirubin, total 0.7 0.2 - 1.0 mg/dL    AST (SGOT) 31 15 - 37 U/L    ALT (SGPT) 13 12 - 78 U/L    Alk.  phosphatase 101 45 - 117 U/L    Protein, total 8.1 6.4 - 8.2 g/dL    Albumin 2.7 (L) 3.5 - 5.0 g/dL    Globulin 5.4 (H) 2.0 - 4.0 g/dL    A-G Ratio 0.5 (L) 1.1 - 2.2     TROPONIN I    Collection Time: 12/01/20 10:25 AM   Result Value Ref Range    Troponin-I, Qt. <0.05 <0.05 ng/mL   MAGNESIUM    Collection Time: 12/01/20 10:25 AM   Result Value Ref Range    Magnesium 2.4 1.6 - 2.4 mg/dL   BNP    Collection Time: 12/01/20 10:25 AM   Result Value Ref Range    NT pro- (H) <125 pg/mL   HEMOGLOBIN A1C WITH EAG    Collection Time: 12/01/20 10:25 AM   Result Value Ref Range    Hemoglobin A1c 5.6 4.0 - 5.6 %    Est. average glucose 114 mg/dL   BLOOD GAS, ARTERIAL    Collection Time: 12/01/20 11:25 AM   Result Value Ref Range    pH 7.461 (H) 7.35 - 7.45      PCO2 27 (L) 35 - 45 mmHg    PO2 149 (H) 70 - 100 mmHg    BICARBONATE 19 (L) 22 - 26 mmol/L    BASE DEFICIT 4.0 (H) 0 - 2 mmol/L    O2 METHOD Nasal Cannula      O2 FLOW RATE 3 L/min    FIO2 32.0 %    Sample source Arterial      SITE Left Radial      ANNA'S TEST PASS      Carboxy-Hgb 0.3 0 - 5 %    Methemoglobin 0.3 0 - 5 %    tHb 11.3 (L) 13.5 - 17.5 g/dL    Oxyhemoglobin 98.3 95 - 100 %   URINALYSIS W/ RFLX MICROSCOPIC    Collection Time: 12/01/20  1:00 PM   Result Value Ref Range    Color Yellow/Straw      Appearance COULDY Clear    Specific gravity 1.015 1.003 - 1.030      pH (UA) 8.5 (H) 5.0 - 8.0      Protein 30 (A) Negative mg/dL    Glucose Negative Negative mg/dL    Ketone Negative Negative mg/dL    Blood Negative Negative      Urobilinogen 1.0 0.2 - 1.0 EU/dL    Nitrites Negative Negative      Leukocyte Esterase Large (A) Negative     BILIRUBIN, CONFIRM    Collection Time: 12/01/20  1:00 PM   Result Value Ref Range    Bilirubin UA, confirm Small (A) Negative     URINE MICROSCOPIC    Collection Time: 12/01/20  1:00 PM   Result Value Ref Range    WBC 20-50 0 - 5 /hpf    RBC 0-5 0 - 3 /hpf    Epithelial cells Moderate /lpf    Bacteria 2+ (A) Negative /hpf   CULTURE, URINE    Collection Time: 12/01/20  1:00 PM    Specimen: Urine   Result Value Ref Range    Special Requests: No Special Requests      [place] Count >100,000  colonies/ml        Culture result: Gram Negative Rods (A)     LACTIC ACID    Collection Time: 12/01/20  2:45 PM   Result Value Ref Range    Lactic acid 3.0 (HH) 0.4 - 2.0 mmol/L   GLUCOSE, POC    Collection Time: 12/01/20  9:31 PM   Result Value Ref Range    Glucose (POC) 169 (H) 65 - 100 mg/dL    Performed by Natasha Lacy    LACTIC ACID    Collection Time: 12/02/20  2:20 AM   Result Value Ref Range    Lactic acid 3.5 (HH) 0.4 - 2.0 mmol/L   GLUCOSE, POC    Collection Time: 12/02/20  3:31 AM   Result Value Ref Range    Glucose (POC) 163 (H) 65 - 100 mg/dL    Performed by [facility] [address] [place] [address], BASIC    Collection Time: 12/02/20  5:38 AM   Result Value Ref Range    Sodium 150 (H) 136 - 145 mmol/L    Potassium 3.7 3.5 - 5.1 mmol/L    Chloride 114 (H) 97 - 108 mmol/L    CO2 22 21 - 32 mmol/L    Anion gap 14 5 - 15 mmol/L    Glucose 177 (H) 65 - 100 mg/dL    BUN 42 (H) 6 - 20 mg/dL    Creatinine 1.59 (H) 0.70 - 1.30 mg/dL    BUN/Creatinine ratio 26 (H) 12 - 20      GFR est AA 53 (L) >60 ml/min/1.73m2    GFR est non-AA 44 (L) >60 ml/min/1.73m2    Calcium 8.2 (L) 8.5 - 10.1 mg/dL   MAGNESIUM    Collection Time: 12/02/20  5:38 AM   Result Value Ref Range    Magnesium 2.1 1.6 - 2.4 mg/dL   CBC WITH AUTOMATED DIFF    Collection Time: 12/02/20  5:38 AM   Result Value Ref Range    WBC 10.3 4.4 - 11.3 K/uL    RBC 3.34 (L) 4.50 - 5.90 M/uL    HGB 9.8 (L) 13.5 - 17.5 g/dL    HCT 30.1 (L) 41 - 53 %    MCV 90.3 80 - 100 FL    MCH 29.3 (L) 31 - 34 PG    MCHC 32.5 31.0 - 36.0 g/dL    RDW 16.4 (H) 11.5 - 14.5 %    PLATELET 789 K/uL    MPV 7.8 6.5 - 11.5 FL    NRBC 10.0  WBC    ABSOLUTE NRBC 0.01 K/uL    NEUTROPHILS 80 (H) 42 - 75 %    LYMPHOCYTES 13 (L) 20.5 - 51.1 %    MONOCYTES 7 1.7 - 9.3 %    EOSINOPHILS 0 (L) 0.9 - 2.9 %    BASOPHILS 0 0.0 - 2.5 %    ABS. NEUTROPHILS 8.3 (H) 1.8 - 7.7 K/UL    ABS. LYMPHOCYTES 1.3 1.0 - 4.8 K/UL    ABS. MONOCYTES 0.7 0.2 - 2.4 K/UL    ABS. EOSINOPHILS 0.0 0.0 - 0.7 K/UL    ABS. BASOPHILS 0.0 0.0 - 0.2 K/UL   GLUCOSE, POC    Collection Time: 12/02/20  8:08 AM   Result Value Ref Range    Glucose (POC) 143 (H) 65 - 100 mg/dL    Performed by [facility]    LACTIC ACID    Collection Time: 12/02/20  9:15 AM   Result Value Ref Range    Lactic acid 3.7 (HH) 0.4 - 2.0 mmol/L   GLUCOSE, POC    Collection Time: 12/02/20 11:22 AM   Result Value Ref Range    Glucose (POC) 162 (H) 65 - 100 mg/dL    Performed by [facility]    LACTIC ACID    Collection Time: 12/02/20 12:17 PM   Result Value Ref Range    Lactic acid 2.6 (HH) 0.4 - 2.0 mmol/L        Imaging:  Xr Chest Port    Result Date: 12/1/2020  IMPRESSION: 1. Interval development right lower lobe consolidative infiltrate. Xr Chest Port    Result Date: 11/28/2020  IMPRESSION: No acute pulmonary or pleural disease.        Assessment//Plan           Problem List:  Hospital Problems  Never Reviewed          Codes Class Noted POA    Hypoxia ICD-10-CM: R09.02  ICD-9-CM: 799.02  12/1/2020 Unknown        * (Principal) PNA (pneumonia) ICD-10-CM: J18.9  ICD-9-CM: 486  12/1/2020 Unknown        NELSON (acute kidney injury) [facility]) ICD-10-CM: N17.9  ICD-9-CM: 584.9  12/1/2020 Unknown               Acute possible aspiration pneumonia  - CXR w RLL infiltrate, +cough, +hypoxia  -Patient therapy has been evaluating patient at the nursing home and has noted to the family about patient's increased coughing and aspiration risk  - NPO  - ST consult pending  - IV ABX Zosyn + Azithromycin  - BLD CX x 2 sent - follow results  -Continue duo nebs every 4 hours  -Lactic acid elevated at 3 then increased to 3.7 and now on downward trend at 2.6  -Taper O2 as tolerated to maintain saturation greater 92%    Sepsis:  -Afebrile and normal white blood count but elevated lactic acid and hypotensive  -Blood cultures x2 obtained follow  -Continue current antibiotics of Zosyn and azithromycin  -Lactic acid initially was 3 then increased and max at 3.7 but now on downward trend at 2.6  -Patient blood pressure low on arrival and initially was given 2 L of fluid and was continued on D5 one half NS at 1 hr   -12/2: Patient blood pressure still low with systolic of 85 075 cc of normal saline bolus was given increase IV fluids to 125 we will monitor vitals closely     Covid PUI  -Resident of nursing home with hypoxia and pneumonia on x-ray  - Observe Droplet Plus Precautions  - Covid PCR pending  -If possible PEG tube placement patient may need rapid Covid testing in-house     NELSON  - BUN Cr 45/2.15 and was given several liters of fluid on admission and continued on IVF D51/2NS @ 75ml/hr  -12/2: Patient continued to be hypotensive so 5 cc normal saline bolus was given and increased fluids to 125 cc an hour BUN/creatinine improved to 42/1.59     Diabetes  - Accuchecks Q6H  - SSI coverage if needed   - A1c 5.6     CVA  - Prior hx of CVA  - continues to have issues with dysphagia      Dysphagia  - Consult to GI and plan for peg tube placement in AM tomorrow  - NPO except medications   - will need Rapid covid swab      Hypertension  - Hypotensive upon arrival  - Hold anti-hypertensives  - IVF as ordered  - Monitor BP q4hrs  - 12/2: additional 500cc NS bollus x 1    Elevated Lactic Acid  - Likely s/t increased tissue demand  - Treat underlying PNA  - Supplemental O2  - IVF as above     ?UTI  - 2+ bacteria, but also moderate epi cells  - Send UA CX - follow  - Likely pathogens covered by Zosyn    GI Prophylaxis :  pepcid IV bid        DNR     Spent 30 minutes evaluting and coordinating patient care of which >50% was spent coordinating and counseling.        Electronically signed by Bobby Posada MD on 12/2/2020 at 3:08 PM

## 2020-12-02 NOTE — CONSULTS
Gastroenterology Consult      Patient: Allyn Serrato. MRN: [id number]  SSN: [id number]    YOB: 1955  Age: 60 y.o. Sex: male        Assessment:     1. Dysphagia  -Patient to be oral pharyngeal dysphagia and possibly secondary to extension of the previous CVA. 2.  Aspiration pneumonia  3. Suspected sepsis  4. Acute kidney insufficiency  5. Urinary tract infection  6. History of CVA  7. Diabetes mellitus type 2  8. Dementia  Plan:     1. I discussed the risks and benefits of insertion of a PEG tube with the patient's sister, Darlene Dumont, who states she wished to have the PEG tube inserted. Schedule for insertion of the PEG tube on tomorrow, 12/3/2020. She was informed that it may have to be delayed if patient is not stable concerning for sepsis or hypotension. He will be reevaluated in the morning. Subjective:     Reason for consultation: Evaluation for PEG tube insertion    History:   81-year-old male with history of hypertensive atherosclerotic cardiovascular disease, status post CVA, diabetes mellitus type 2, glaucoma with blindness who is a resident of a local nursing home who presented to the emergency room with a persistent cough and history of aspiration oral intake. He is unable to give any history the patient apparently had work-up at the nursing home by speech therapy who was noted that he aspirated whenever he would take oral intake due to concern about pneumonia. Patient was brought to the emergency room where he was found to be hypotensive, hypoxic, and chest x-ray showing a right lower lobe infiltrate. Patient was also found to have a urinary tract infection and evidence of acute renal failure. After talking with the patient's sister she states they decided that the patient needed to have a PEG tube inserted to prevent this recurrent aspiration. She wanted the procedure done while he was hospitalized here.     Hospital Problems  Never Reviewed          Codes Class Noted POA    Hypoxia ICD-10-CM: R09.02  ICD-9-CM: 799.02  12/1/2020 Unknown        * (Principal) PNA (pneumonia) ICD-10-CM: J18.9  ICD-9-CM: 486  12/1/2020 Unknown        NELSON (acute kidney injury) ([facility] ICD-10-CM: N17.9  ICD-9-CM: 584.9  12/1/2020 Unknown            Past Medical History:   Diagnosis Date    [facility]cere[facility] vascular accident[facility] ([facility]     Diabetes ([facility]     Glaucoma     Hypertension      Past Surgical History:   Procedure Laterality Date    HX CATARACT REMOVAL        History reviewed. No pertinent family history.   Social History     Tobacco Use    Smoking status: Former Smoker    Smokeless tobacco: Never Used   Substance Use Topics    Alcohol use: Not Currently      Current Facility-Administered Medications   Medication Dose Route Frequency Provider Last Rate Last Dose    famotidine (PF) (PEPCID) 20 mg in 0.9% sodium chloride 10 mL injection  20 mg IntraVENous Q12H Conner David MD        azithromycin (ZITHROMAX) 500 mg in 0.9% sodium chloride 250 mL (VIAL-MATE)  500 mg IntraVENous Q24H Conner David MD   500 mg at 12/02/20 1604    sodium chloride 0.9 % bolus infusion 1,000 mL  1,000 mL IntraVENous ONCE Conner David  mL/hr at 12/02/20 1800 1,000 mL at 12/02/20 1800    0.9% sodium chloride with KCl 20 mEq/L infusion   IntraVENous CONTINUOUS Conner David MD        piperacillin-tazobactam (ZOSYN) 3.375 g in 0.9% sodium chloride (MBP/ADV) 100 mL MBP  3.375 g IntraVENous Q8H Conner David MD 25 mL/hr at 12/02/20 1352 3.375 g at 12/02/20 1352    albuterol-ipratropium (DUO-NEB) 2.5 MG-0.5 MG/3 ML  3 mL Nebulization Q4H RT Conner David MD   3 mL at 12/02/20 1650    glucose chewable tablet 16 g  4 Tab Oral PRN Halie David MD        dextrose (D50W) injection syrg 12.5-25 g  25-50 mL IntraVENous PRN Conner David MD        glucagon (GLUCAGEN) injection 1 mg  1 mg IntraMUSCular PRN Halie David MD        insulin lispro (HUMALOG) injection   SubCUTAneous AC&HS Giovanni Watson MD   Stopped at 12/02/20 1630    sodium chloride (NS) flush 5-40 mL  5-40 mL IntraVENous Q8H Conner David MD   10 mL at 12/02/20 1352    sodium chloride (NS) flush 5-40 mL  5-40 mL IntraVENous PRN Flores David MD        acetaminophen (TYLENOL) tablet 650 mg  650 mg Oral Q6H PRN Flores David MD        Or    acetaminophen (TYLENOL) suppository 650 mg  650 mg Rectal Q6H PRN Conner David MD        polyethylene glycol (MIRALAX) packet 17 g  17 g Oral DAILY PRN Conner David MD        ondansetron (ZOFRAN) injection 4 mg  4 mg IntraVENous Q6H PRN Conner David MD        heparin (porcine) injection 5,000 Units  5,000 Units SubCUTAneous Q12H Giovanni Watson MD   5,000 Units at 12/02/20 1511        No Known Allergies    Review of Systems:  Unable to give any history secondary to dementia and aphasia. All history was obtained from patient's chart and with brief discussion with his sister      Objective:     Vitals:    12/02/20 1406 12/02/20 1537 12/02/20 1600 12/02/20 1650   BP: (!) 91/54 (!) 90/58     Pulse: 96 92 92    Resp:  17     Temp:  97.1 °F (36.2 °C)     SpO2:  90%  98%   Weight:       Height:            Physical Exam:  General: Alert, cooperative, no distress. Head:  Normocephalic, without obvious abnormality, atraumatic. Eyes:  Conjunctivae/corneas clear. Pupils equal, round, reactive to light. Extraocular movements intact. Lungs: Scattered rhonchi  Chest wall: No tenderness or deformity. Heart:  Regular rate and rhythm, S1, S2 normal, no murmur, click, rub, or gallop. Abdomen:  Soft, non-tender. Bowel sounds normal. No masses. No organomegaly. Extremities: Extremities normal, atraumatic, no cyanosis or edema. Pulses: 2+ and symmetric all extremities. Skin: Skin color, texture, turgor normal. No rashes or lesions.   Lymph nodes: Cervical, supraclavicular, and axillary nodes normal.  Neurologic: CNII-XII intact. Normal strength, sensation, and reflexes throughout. CBC w/Diff Recent Labs     12/02/20  0538 12/01/20  1025   WBC 10.3 10.6   RBC 3.34* 4.00*   HGB 9.8* 11.7*   HCT 30.1* 36.2*    453   GRANS 80* 83*   LYMPH 13* 11*   EOS 0* 0*        Chemistry Recent Labs     12/02/20  0538 12/01/20  1025   * 119*   * 146*   K 3.7 4.6   * 111*   CO2 22 22   BUN 42* 45*   CREA 1.59* 2.15*   CA 8.2* 9.1   MG 2.1 2.4   AGAP 14 13   BUCR 26* 21*   AP  --  101   TP  --  8.1   ALB  --  2.7*   GLOB  --  5.4*   AGRAT  --  0.5*        Lactic Acid Lactic acid   Date Value Ref Range Status   12/02/2020 3.0 (HH) 0.4 - 2.0 mmol/L Final     Recent Labs     12/02/20  1500 12/02/20  1217 12/02/20  0915   LAC 3.0* 2.6* 3.7*        Micro  Recent Labs     12/01/20  1300   CULT Gram Negative Rods*     Recent Labs     12/01/20  1300   CULT Gram Negative Rods*        Liver Enzymes Protein, total   Date Value Ref Range Status   12/01/2020 8.1 6.4 - 8.2 g/dL Final     Albumin   Date Value Ref Range Status   12/01/2020 2.7 (L) 3.5 - 5.0 g/dL Final     Globulin   Date Value Ref Range Status   12/01/2020 5.4 (H) 2.0 - 4.0 g/dL Final     A-G Ratio   Date Value Ref Range Status   12/01/2020 0.5 (L) 1.1 - 2.2   Final     Alk.  phosphatase   Date Value Ref Range Status   12/01/2020 101 45 - 117 U/L Final     Recent Labs     12/01/20  1025   TP 8.1   ALB 2.7*   GLOB 5.4*   AGRAT 0.5*             Cardiac Enzymes No results found for: CPK, CK, CKMMB, CKMB, RCK3, CKMBT, CKNDX, CKND1, GERARDO, TROPT, TROIQ, ZULY, TROPT, TNIPOC, BNP, BNPP     BNP No results found for: BNP, BNPP, XBNPT     Coagulation Recent Labs     12/01/20  1025   PTP 10.8   INR 1.1         Thyroid  No results found for: T4, T3U, TSH, TSHEXT       Lipid Panel No results found for: CHOL, CHOLPOCT, CHOLX, CHLST, CHOLV, 462172, HDL, HDLP, LDL, LDLC, DLDLP, 723016, VLDLC, VLDL, TGLX, TRIGL, TRIGP, TGLPOCT, CHHD, CHHDX     Urinalysis Lab Results   Component Value Date/Time    Color Yellow/Straw 12/01/2020 01:00 PM    Appearance COULDY 12/01/2020 01:00 PM    Specific gravity 1.015 12/01/2020 01:00 PM    pH (UA) 8.5 (H) 12/01/2020 01:00 PM    Protein 30 (A) 12/01/2020 01:00 PM    Glucose Negative 12/01/2020 01:00 PM    Ketone Negative 12/01/2020 01:00 PM    Urobilinogen 1.0 12/01/2020 01:00 PM    Nitrites Negative 12/01/2020 01:00 PM    Leukocyte Esterase Large (A) 12/01/2020 01:00 PM    Epithelial cells Moderate 12/01/2020 01:00 PM    Bacteria 2+ (A) 12/01/2020 01:00 PM    WBC 20-50 12/01/2020 01:00 PM    RBC 0-5 12/01/2020 01:00 PM        XR (Most Recent). CXR reviewed by me and compared with previous CXR Results from Hospital Encounter encounter on 12/01/20   XR CHEST PORT    Narrative Hypoxic. Comparison chest x-ray 11/28/2020. FINDINGS: Frontal single view chest. Normal heart size. No vascular congestion  or pulmonary edema. Interval development of right lower lobe consolidative  infiltrate. No effusion or pneumothorax. Chronic deformities of bilateral  shoulders, AC joints. No free air under the diaphragm. Impression IMPRESSION:  1. Interval development right lower lobe consolidative infiltrate. CT (Most Recent) No results found for this or any previous visit.            Nicolasa Habermann, MD  12/2/2020  6:46 PM.

## 2020-12-02 NOTE — ROUTINE PROCESS
Bedside shift change report given to cheyenne minor (oncoming nurse) by Kendrick Block (offgoing nurse). Report included the following information SBAR.

## 2020-12-03 NOTE — INTERVAL H&P NOTE
Update History & Physical    The Patient's History and Physical of December 03, 2020    was reviewed with the patient and I examined the patient. There was no change. The surgical site was confirmed by the patient and me. Plan:  The risk, benefits, expected outcome, and alternative to the recommended procedure have been discussed with the patient. Patient understands and wants to proceed with the procedure.     Electronically signed by Amy Sky MD on 12/3/2020 at 10:19 AM

## 2020-12-03 NOTE — ROUTINE PROCESS
Turned. CNAs in for am care. Plan for PEG today. OR staff up to review. IVF infusing. O2 per nc. Monitor on. MA intact to rt wrist. Benjamin in place.  Pt NPO

## 2020-12-03 NOTE — OP NOTES
Upper Endoscopy Procedure Note      Patient: Camille Buckley. MRN: [id number]  SSN: [id number]    YOB: 1955  Age: 60 y.o. Sex: male      Date/Time:  12/3/2020 11:17 AM        Procedure: Procedure(s):  PERCUTANEOUS ENDOSCOPIC GASTROSTOMY TUBE INSERTION       IMPRESSION:   1. Successful PEG placement. 2.  Atrophic gastric mucosa    RECOMMENDATIONS:  1. NPO x 4 hours and nothing by tube x 4 hours except meds. At 24 hrs, feeding can be started at 30 ml per hour and advanced in 12 hours as tolerate to a goal rate decided by PMD.  2. Restraint the hands or use an abdominal binder to prevent pulling of the PEG if needed. 3. Call me (or covering GI doctor) if there is abdominal pain, bleeding, redness, fever or any issues with PEG. 4.  See care of gastrostomy tube orders in chart. 5.  No biotic prophylaxis was needed since patient is already on Zosyn. Indication: Dysphagia and aspiration pneumonia    Endoscopist:  Nicolasa Habermann, MD    Referring Provider:   Lizeth Braxton MD    Endoscope: GIF H190 Olympus video endoscope    Extent of Exam: second portion of the duodenum    ASA 3 - Patient with moderate systemic disease with functional limitations:    Anethesia/Sedation:  MAC anesthesia    Description of the procedure: The procedure was discussed with the patient including risks, benefits, alternatives including risks of iv sedation, bleeding, perforation and aspiration. A safety timeout was performed. The patient was placed in the left lateral decubitus position. A bite block was placed. The patient was given incremental doses of intravenous sedation until moderate sedation was achieved. The patients vital signs were monitored at all times including heart rate/rhythm, blood pressure and oxygen saturation. The endoscope was then passed under direct visualization to the second portion of the duodenum. The endoscope was then slowly withdrawn while visualizing the mucosa.   In the stomach a retroflexion was performed and gastric fundus and cardia visualized. Transillumination was achieved from the distal body of the stomach to the anterior abdominal wall and 1-1 indentation was noted . Under sterile precautions, 1% Lidocaine was infiltrated, and a trochar was passed in to the stomach lumen under endoscopic view. A guidewire was threaded in to this, grasped with a snare by endoscopist and pulled out through the patient's mouth. By locking the wire in a loop with the PEG, a 24 Fr pull-type PEG was placed at 2 cm from the internal bumper without any difficulty. Sterile dressing was applied. The patient was then transferred to recovery in stable condition. Findings:   Esophagus: The esophageal mucosa was normal with no ulceration, mass or stricture. There was no evidence of Adler's esophagus or reflux esophagitis. Stomach: The gastric mucosa was normal with no ulceration, mass, stricture. Successful PEG placement in the distal body. Duodenum: The duodenum mucosa was normal with no ulceration, mass, stricture and no evidence of villous atrophy. Therapies: Insertion of PEG tube    Specimens: * No specimens in log *           Complications:   None; patient tolerated the procedure well.     EBL:Minimal    Implants: 24 Fr PEG tube     Discharge disposition:  Out of the recovery area when discharge criteria met      Terence Brand MD  December 3, 2020  11:17 AM

## 2020-12-03 NOTE — PROGRESS NOTES
Comprehensive Nutrition Assessment    Type and Reason for Visit: Initial    Nutrition Recommendations/Plan:      TF Rec's @ Start Rate: Jevity 1.2 @ 25ml, increase 10ml q8hrs until @ goal rate @ 55ml w/ water flushes 160 q6hrs. Check residuals q8hrs for <200ml. Nutrition addtl' Rec: Request an A1C, Monitor & record daily wts & Residuals. Will continue to follow x3-4day. Nutrition Assessment:  78-year-old male with history of hypertensive atherosclerotic cardiovascular disease, status post CVA, diabetes mellitus type 2, glaucoma with blindness who is a resident of a local nursing home who presented to the emergency room with a persistent cough and history of aspiration oral intake. He is unable to give any history the patient apparently had work-up at the nursing home by speech therapy who was noted that he aspirated whenever he would take oral intake due to concern about pneumonia. Pt is currently following by GI, who talked to his sister who stated,  they decided that the patient needed to have a PEG tube inserted to prevent this recurrent aspiration. Peg tube scheduled for 12/3. Nutrition related lab values: hbg (9.8), HCT (30.1), GFR (53), Bun (42), Cre (1.59), Na (150), glucose (177). Meds: heparin, humalog, azithromycin (500mg) q24hrs @ 250ml/hr over 60 mins. Malnutrition Assessment:  Malnutrition Status:  Insufficient data(unable to perform NFPE due to covid 19 precautions)      Estimated Daily Nutrient Needs:  Energy (kcal): 1,475kcals(25kcal/kg); Weight Used for Energy Requirements: Current  Protein (g): 47g(.8g/kg); Weight Used for Protein Requirements: Current  Fluid (ml/day): 1,770ml(30ml/kg); Method Used for Fluid Requirements: ml/kg      Nutrition Related Findings:  cogition: alert, noncommunicative.  Digestive system: abdomen: soft, non-tender, no reported n/c, v/d, oral dysphagia. extremities/muscle/bone: bedbound, unable to observed for muscle & fat wasting r/t isolation room due to covid 19 precautions. Head/eyes/nails: micronutrient deficiencies unable to be assessed r/t isolation room due to covid 19 precautions. Wounds:    None       Current Nutrition Therapies:  Current Tube Feeding (TF) Orders:   · Feeding Route: PEG/J  · Formula: Jevity 1.2  · Schedule:Continuous    · Regimen: 55ml  · Water Flushes: [id number]  · Goal TF & Flush Orders Provides: provide total kcals (1,584kcals), provide total protien (73g), provide total water w/ water flushes ( 1,705ml)      Anthropometric Measures:  · Height:  4' 9\" (144.8 cm)  · Current Body Wt:  59 kg (130 lb)     · Ideal Body Wt:  88 lbs:  147.7 %   · BMI Category: Overweight (BMI 25.0-29. 9)       Nutrition Diagnosis:   · Inadequate protein-energy intake related to other (specify)(npo) as evidenced by other (specify)(unable to safety swallow food r/t aspirating after eating)      Nutrition Interventions:   Food and/or Nutrient Delivery: Start tube feeding  Nutrition Education and Counseling: No recommendations at this time  Coordination of Nutrition Care: Continue to monitor while inpatient    Goals:  tolerable TF- residuals <200ml, Wt.  Stability +/- 1Kg       Nutrition Monitoring and Evaluation:   Behavioral-Environmental Outcomes: None identified  Food/Nutrient Intake Outcomes: Enteral nutrition intake/tolerance  Physical Signs/Symptoms Outcomes: Weight    Discharge Planning:    No discharge needs at this time

## 2020-12-03 NOTE — ANESTHESIA PREPROCEDURE EVALUATION
Relevant Problems   No relevant active problems       Anesthetic History   No history of anesthetic complications            Review of Systems / Medical History  Patient summary reviewed, nursing notes reviewed and pertinent labs reviewed    Pulmonary                Comments: Hypoxia  Suspected aspiration   Neuro/Psych       CVA  TIA     Cardiovascular    Hypertension              Exercise tolerance: <4 METS     GI/Hepatic/Renal  Within defined limits              Endo/Other    Diabetes         Other Findings              Physical Exam    Airway  Mallampati: III    Neck ROM: decreased range of motion        Cardiovascular    Rhythm: regular  Rate: normal         Dental      Comments: Pt uncooperative with exam, difficult to assess. Pulmonary        Wheezes         Abdominal  GI exam deferred       Other Findings            Anesthetic Plan    ASA: 3  Anesthesia type: total IV anesthesia and general          Induction: Intravenous  Anesthetic plan and risks discussed with: Patient, Family and Healthcare power of       General anesthesia was prescribed for this patient because by definition it is \"a drug-induced loss of consciousness during which patients are not arousable, even by painful stimulation. \" Sometimes, the ability to independently maintain ventilatory function is often impaired and patients often require assistance in maintaining a patent airway. Occasionally, positive pressure ventilation may be required because of depressed spontaneous ventilation or drug-induced depression of neuromuscular function. This depth of anesthesia is preferred for endoscopic procedures to facilitate the procedure and for patient safety/quality of care.

## 2020-12-03 NOTE — ANESTHESIA POSTPROCEDURE EVALUATION
Procedure(s):  PERCUTANEOUS ENDOSCOPIC GASTROSTOMY TUBE INSERTION.     total IV anesthesia, general    Anesthesia Post Evaluation      Multimodal analgesia: multimodal analgesia not used between 6 hours prior to anesthesia start to PACU discharge  Patient location during evaluation: PACU  Patient participation: complete - patient participated  Level of consciousness: awake  Pain score: 0  Pain management: adequate  Airway patency: patent  Anesthetic complications: no  Cardiovascular status: acceptable  Respiratory status: acceptable  Hydration status: acceptable  Post anesthesia nausea and vomiting:  controlled  Final Post Anesthesia Temperature Assessment:  Normothermia (36.0-37.5 degrees C)      INITIAL Post-op Vital signs:  BP:  99/63, HR:  90, RR: 22, SpO2:  100%

## 2020-12-03 NOTE — PROGRESS NOTES
Progress Note    Patient: Valiant Ards. MRN: [id number]  SSN: [id number]    YOB: 1955  Age: 60 y.o. Sex: male      Admit Date: 12/1/2020    LOS: 2 days     Subjective:     Patient presents from the nursing home with aspiration pneumonia. Found to be aspirating with p.o. intake at the nursing home. GI evaluated the patient and discussed with the family. For PEG tube placement today. Patient is currently noncommunicative. Objective:     Vitals:    12/03/20 0444 12/03/20 0722 12/03/20 0754 12/03/20 0800   BP:  106/68     Pulse:  89  88   Resp:  18     Temp:  97.6 °F (36.4 °C)     SpO2: 98% 100% 100%    Weight:       Height:            Intake and Output:  Current Shift: No intake/output data recorded. Last three shifts: 12/01 1901 - 12/03 0700  In: 4950 [I.V.:3413]  Out: 7798 [Urine:1270]    Physical Exam:   GENERAL: alert, no distress, appears stated age  THROAT & NECK: normal and no erythema or exudates noted. LUNG: clear to auscultation bilaterally  HEART: regular rate and rhythm, S1, S2 normal, no murmur, click, rub or gallop  ABDOMEN: soft, non-tender. Bowel sounds normal. No masses,  no organomegaly  EXTREMITIES:  extremities normal, atraumatic, no cyanosis or edema  SKIN: no rash or abnormalities  NEUROLOGIC: Awake and alert, no acute deficit   PSYCHIATRIC: non focal    Lab/Data Review: All lab results for the last 24 hours reviewed.      Recent Results (from the past 24 hour(s))   GLUCOSE, POC    Collection Time: 12/02/20 11:22 AM   Result Value Ref Range    Glucose (POC) 162 (H) 65 - 100 mg/dL    Performed by Holly Hopkins    LACTIC ACID    Collection Time: 12/02/20 12:17 PM   Result Value Ref Range    Lactic acid 2.6 (HH) 0.4 - 2.0 mmol/L   LACTIC ACID    Collection Time: 12/02/20  3:00 PM   Result Value Ref Range    Lactic acid 3.0 (HH) 0.4 - 2.0 mmol/L   GLUCOSE, POC    Collection Time: 12/02/20  3:38 PM   Result Value Ref Range    Glucose (POC) 113 (H) 65 - 100 mg/dL Performed by Minesh Grey    COVID-19 RAPID TEST    Collection Time: 12/02/20  5:00 PM   Result Value Ref Range    Specimen source Nasopharyngeal      COVID-19 rapid test Negative (A) Not Detected     GLUCOSE, POC    Collection Time: 12/02/20  9:20 PM   Result Value Ref Range    Glucose (POC) 78 65 - 100 mg/dL    Performed by Martín Owen    GLUCOSE, POC    Collection Time: 12/02/20 11:01 PM   Result Value Ref Range    Glucose (POC) 80 65 - 100 mg/dL    Performed by Martín Owen    LACTIC ACID    Collection Time: 12/03/20  5:15 AM   Result Value Ref Range    Lactic acid 0.9 0.4 - 2.0 mmol/L   GLUCOSE, POC    Collection Time: 12/03/20  7:21 AM   Result Value Ref Range    Glucose (POC) 74 65 - 100 mg/dL    Performed by Minesh Grey         Imaging:    No results found.        Assessment and Plan:     Sepsis  -Patient hypothermic, hypotensive and abnormal lactic acid level  -Sepsis secondary to UTI and pneumonia   -Blood cultures so far negative  -Hemodynamically improving    Aspiration pneumonia  -Patient found to be aspirating foods at the nursing home  -Chest x-ray with interval development of right lower lobe consolidative infiltrate  -Blood cultures so far negative, sputum culture pending, Covid rapid test negative but PCR pending  -Continue Zosyn and azithromycin    Urinary tract infection  -Urine culture growing gram-negative rods more than 100,000  -Continue current antibiotics and follow urine culture    NELSON  -Mild elevated serum creatinine noted  -Repeat labs pending  -Continue IV fluid and monitor renal function    Hypernatremia  -Change IV fluid to D5 half-normal saline  -Monitor electrolytes    Hypertension  -Stable, hypotension is now improved    Diabetes  -Continue insulin sliding scale coverage    CVA  -History of prior CVA  -Unclear baseline functional status    Dysphagia  -GI evaluated the patient  -Plan for PEG tube placement today    Dissipated disposition is more than 48 hours pending progress.     Signed By: Gina Jay MD     December 3, 2020

## 2020-12-03 NOTE — PROGRESS NOTES
Speech Therapy consult not completed at this time.  SLP spoke with on call provider, who states patient is currently not appropriate for speech therapy evaluation.  Please repeat consult if any therapy needs arise. Thank you.

## 2020-12-03 NOTE — PROGRESS NOTES
Telephone consent was obtained/verified by this writer and Geronimo Rea, we spoke with pt's sister Yonathan Mcnair [phone] who confirms she spoke with Dr. Diann Sánchez who explained the benefits and risks of pt getting a peg tube. Vik Petit confirms all of her questions and concerns were addressed and she gives consent for Mr. Nelida Addison to get a PEG tube tomorrow. Consent was signed and placed on pt's chart.  DNR wrist band was put on pt's right wrist.

## 2020-12-03 NOTE — PROGRESS NOTES
Attempted to visit patient Mr. Maria R Stover in 2 acute care V Jihan Karissa during rounds. Per droplet isolation protocol currently in effect, I provided silent support and prayer outside the door. No family present. Chaplains will follow up if further referrals are requested. Chaplain Kimmie Kemp M.Div.    can be reached by calling the  at [facility]  [phone]

## 2020-12-04 NOTE — ROUTINE PROCESS
TF infusing. Starting slow to advance per order. IVF infusing. Benjamin intact. No resp distress. Pt NPO. Multiple contractures noted. PEG dressing dry intact.

## 2020-12-04 NOTE — PROGRESS NOTES
Care Management Interventions  Mode of Transport at Discharge: BLS  Transition of Care Consult (CM Consult): [address]  Current Support Network: [address] [place] [address]

## 2020-12-04 NOTE — PROGRESS NOTES
Per Yoseph Roberto at [facility], they will take the patient back tomorrow, 12.5.20, if medically stable. Nurse/ to fax discharge summary and MAR to [phone]. Please call [phone] to give nurse report.  Pt will go to room 200

## 2020-12-04 NOTE — PROGRESS NOTES
Progress Note    Patient: Azael Hu MRN: [id number]  SSN: [id number]    YOB: 1955  Age: 60 y.o. Sex: male      Admit Date: 12/1/2020    LOS: 3 days     Subjective:     Patient presents from the nursing home with aspiration pneumonia. Found to be aspirating with p.o. intake at the nursing home. GI evaluated the patient and discussed with the family. For PEG tube placement today. Patient is currently noncommunicative. Objective:     Vitals:    12/04/20 0345 12/04/20 0350 12/04/20 0400 12/04/20 0810   BP: 123/71   126/71   Pulse: 95  96 91   Resp: 18   20   Temp: 97.5 °F (36.4 °C)   98.5 °F (36.9 °C)   SpO2: 97% 95%  99%   Weight:       Height:            Intake and Output:  Current Shift: No intake/output data recorded. Last three shifts: 12/02 1901 - 12/04 0700  In: 794 [I.V.:794]  Out: 1220 [Urine:1220]    Physical Exam:   GENERAL: alert, no distress, appears stated age  THROAT & NECK: normal and no erythema or exudates noted. LUNG: clear to auscultation bilaterally  HEART: regular rate and rhythm, S1, S2 normal, no murmur, click, rub or gallop  ABDOMEN: soft, non-tender. Bowel sounds normal. No masses,  no organomegaly  EXTREMITIES:  extremities normal, atraumatic, no cyanosis or edema  SKIN: no rash or abnormalities  NEUROLOGIC: Awake and alert, no acute deficit   PSYCHIATRIC: non focal    Lab/Data Review: All lab results for the last 24 hours reviewed.      Recent Results (from the past 24 hour(s))   GLUCOSE, POC    Collection Time: 12/03/20 11:40 AM   Result Value Ref Range    Glucose (POC) 74 65 - 100 mg/dL    Performed by [facility] [place], POC    Collection Time: 12/03/20  4:07 PM   Result Value Ref Range    Glucose (POC) 73 65 - 100 mg/dL    Performed by [facility], POC    Collection Time: 12/03/20 10:59 PM   Result Value Ref Range    Glucose (POC) 69 65 - 100 mg/dL    Performed by [facility] [address], BASIC    Collection Time: 12/04/20 5: 37 AM   Result Value Ref Range    Sodium 152 (H) 136 - 145 mmol/L    Potassium 3.0 (L) 3.5 - 5.1 mmol/L    Chloride 120 (H) 97 - 108 mmol/L    CO2 21 21 - 32 mmol/L    Anion gap 11 5 - 15 mmol/L    Glucose 89 65 - 100 mg/dL    BUN 12 6 - 20 mg/dL    Creatinine 0.88 0.70 - 1.30 mg/dL    BUN/Creatinine ratio 14 12 - 20      GFR est AA >60 >60 ml/min/1.73m2    GFR est non-AA >60 >60 ml/min/1.73m2    Calcium 7.9 (L) 8.5 - 10.1 mg/dL   CBC WITH AUTOMATED DIFF    Collection Time: 12/04/20  5:37 AM   Result Value Ref Range    WBC 6.8 4.4 - 11.3 K/uL    RBC 3.25 (L) 4.50 - 5.90 M/uL    HGB 9.5 (L) 13.5 - 17.5 g/dL    HCT 29.1 (L) 41 - 53 %    MCV 89.3 80 - 100 FL    MCH 29.3 (L) 31 - 34 PG    MCHC 32.8 31.0 - 36.0 g/dL    RDW 16.5 (H) 11.5 - 14.5 %    PLATELET 320 K/uL    MPV 7.7 6.5 - 11.5 FL    NRBC 10.0  WBC    ABSOLUTE NRBC 0.01 K/uL    NEUTROPHILS 66 42 - 75 %    LYMPHOCYTES 27 20.5 - 51.1 %    MONOCYTES 5 1.7 - 9.3 %    EOSINOPHILS 1 0.9 - 2.9 %    BASOPHILS 1 0.0 - 2.5 %    ABS. NEUTROPHILS 4.5 1.8 - 7.7 K/UL    ABS. LYMPHOCYTES 1.9 1.0 - 4.8 K/UL    ABS. MONOCYTES 0.3 0.2 - 2.4 K/UL    ABS. EOSINOPHILS 0.0 0.0 - 0.7 K/UL    ABS. BASOPHILS 0.0 0.0 - 0.2 K/UL   GLUCOSE, POC    Collection Time: 12/04/20  6:53 AM   Result Value Ref Range    Glucose (POC) 84 65 - 100 mg/dL    Performed by [facility] [address], POC    Collection Time: 12/04/20 11:00 AM   Result Value Ref Range    Glucose (POC) 86 65 - 100 mg/dL    Performed by Stephanie Min         Imaging:    No results found.        Assessment and Plan:     Sepsis  -Patient hypothermic, hypotensive and abnormal lactic acid level  -Sepsis secondary to UTI and pneumonia   -Blood cultures so far negative  -Hemodynamically improving    Aspiration pneumonia  -Patient found to be aspirating foods at the nursing home  -Chest x-ray with interval development of right lower lobe consolidative infiltrate  -Blood cultures so far negative, sputum culture pending, Covid rapid test negative but PCR pending  -Continue Zosyn and azithromycin    Urinary tract infection  -Urine culture growing Proteus, sensitivity pending  -Continue current antibiotics and follow urine culture    NELSON  -Mild elevated serum creatinine noted  -Repeat labs pending  -Continue IV fluid and monitor renal function    Hypernatremia  -Change IV fluid to D5 half-normal saline  -Monitor electrolytes    Hypertension  -Stable, hypotension is now improved    Diabetes  -Continue insulin sliding scale coverage    CVA  -History of prior CVA  -Unclear baseline functional status    Dysphagia  -S/p PEG tube placement 12/3  -Start tube feeding today  -Nutrition following    Dissipated disposition is 24-48 hours pending progress.     Signed By: Alessandra Holland MD     December 4, 2020

## 2020-12-05 NOTE — DISCHARGE SUMMARY
Physician Discharge Summary       Patient: Zenaida Helton. MRN: [id number]     YOB: 1955  Age: 60 y.o. Sex: male    PCP: Nanette Escobedo MD    Allergies: Patient has no known allergies. Admit date: 12/1/2020  Admitting Provider: Brock Gorman MD    Discharge date: 12/5/2020  Discharging Provider: Brock Gorman MD    * Admission Diagnoses:   PNA (pneumonia) [J18.9]  Hypoxia [R09.02]      * Discharge Diagnoses:    Hospital Problems as of 12/5/2020 Never Reviewed          Codes Class Noted - Resolved POA    Hypoxia ICD-10-CM: R09.02  ICD-9-CM: 799.02  12/1/2020 - Present Unknown        * (Principal) PNA (pneumonia) ICD-10-CM: J18.9  ICD-9-CM: 486  12/1/2020 - Present Unknown        NELSON (acute kidney injury) ([facility] [address] ICD-10-CM: N17.9  ICD-9-CM: 584.9  12/1/2020 - Present Unknown                * Hospital Course: Shmuel Lyn Jr. is a 60 y. o. bed bound, minimally verbal male resident of LT facility, with PMH of hypertension, diabetes, glaucoma w blindness, and prior hx CVA who presents to ED for evaluation of cough. The patient is unable to provide HPI 2/2 to above mentioned factors, and so medical history gathered from sister Carolyn Guerra. She reports the nursing home called her to say the Speech Therapist indicated he was aspirating \"everything\" and \"coughing\" during mealtimes and at other times, and there was concern for pneumonia. In ED, pt afebrile, but hypotensive without tachycardia. Reportedly hypoxic on arrival and placed on 3L O2, with saturations 95%.  CXR shows RLL infiltrate, and Lactic acid level elevated at 3.0.  He was treated with IV Azithromycin and Rocephin and referred for admission.      Acute possible aspiration pneumonia  - CXR w RLL infiltrate, +cough, +hypoxia  -Patient therapy has been evaluating patient at the nursing home and has noted to the family about patient's increased coughing and aspiration risk  -Consultation and status post PEG tube placement still at high aspiration risk will need frequent oral suctioning as well as frequent residual checks  - IV ABX Zosyn + Azithromycin and on discharge will continue Augmentin 875 mg PEG twice daily for another 7 days  - BLD CX x 2 sent -shows no growth  -Continue duo nebs every 4 hours and will continue 4 times daily  -      Sepsis:  -Afebrile and normal white blood count but elevated lactic acid and hypotensive  -Blood cultures x2 obtained follow  -Continue current antibiotics of Zosyn and azithromycin  -Lactic acid initially was 3 then increased and max at 3.7 but now on downward trend at 2.6 and resolved on 12/3 at 0.9  -Patient blood pressure low on arrival and initially was given 2 L of fluid and was continued on D5 one half NS at 1 hr   -12/2: Patient blood pressure still low with systolic of 85 410 cc of normal saline bolus was given increase IV fluids to 125 we will monitor vitals closely     Covid PUI  -Resident of nursing home with hypoxia and pneumonia on x-ray  - Observe Droplet Plus Precautions  - Covid PCR pending  -If possible PEG tube placement patient may need rapid Covid testing in-house     NELSON  - BUN Cr 45/2.15 and was given several liters of fluid on admission and continued on IVF D51/2NS @ 75ml/hr  -12/2: Patient continued to be hypotensive so 5 cc normal saline bolus was given and increased fluids to 125 cc an hour BUN/creatinine improved to 42/1.59  -Patient's BUN/creatinine continues to be improved and is at 7/0.79    Hypokalemia:  -Mildly low at 3.2 and will add 20 mEq of KCl daily dosing via PEG     Diabetes  - Accuchecks Q6H  - SSI coverage if needed   - A1c 5.6     CVA  - Prior hx of CVA  - continues to have issues with dysphagia      Dysphagia  - Consult to GI and plan for peg tube placement   -Rapid Covid swab was done and was negative PCR is still pending  -Patient status post PEG tube and dietary gave recommendations for feeding and currently patient is at goal of Jevity 1.2 at 55 cc an hour and to continue with heart rate 60 cc every 6 hours and free water flushes and to do residual checks every 8 hours  Residuals have been in normal range in the last visual was noted to be 50  Patient does continue to have issues with her dysphagia and has at times issues with his saliva and would benefit from bedside suction catheter and periodic oral suctioning    Hypertension  - Hypotensive upon arrival  - Hold anti-hypertensives of lisinopril  - IVF as ordered  - Monitor BP q4hrs  -Patient status post PEG tube and blood pressure continues to be on lower normal range so we will hold lisinopril on discharge and can be reevaluated at nursing home     Elevated Lactic Acid  - Likely s/t increased tissue demand  - Treat underlying PNA  -Patient was given several fluid boluses and continued on IV fluids with lactic acid resolving on 12/3-0.9    UTI  - 2+ bacteria, but also moderate epi cells  -Urine culture showing positive for Proteus sensitive to Zosyn so we will continue Augmentin 875 mg PEG twice daily for another 7 days    * Procedures:   Procedure(s):  PERCUTANEOUS ENDOSCOPIC GASTROSTOMY TUBE INSERTION    IMPRESSION:   1. Successful PEG placement. 2.  Atrophic gastric mucosa     RECOMMENDATIONS:  1. NPO x 4 hours and nothing by tube x 4 hours except meds. At 24 hrs, feeding can be started at 30 ml per hour and advanced in 12 hours as tolerate to a goal rate decided by PMD.  2. Restraint the hands or use an abdominal binder to prevent pulling of the PEG if needed. 3. Call me (or covering GI doctor) if there is abdominal pain, bleeding, redness, fever or any issues with PEG. 4.  See care of gastrostomy tube orders in chart. 5.  No biotic prophylaxis was needed since patient is already on Zosyn    Consults:   Assessment:      1. Dysphagia  -Patient to be oral pharyngeal dysphagia and possibly secondary to extension of the previous CVA. 2.  Aspiration pneumonia  3. Suspected sepsis  4.   Acute kidney insufficiency  5. Urinary tract infection  6. History of CVA  7. Diabetes mellitus type 2  8. Dementia  Plan:      1.  I discussed the risks and benefits of insertion of a PEG tube with the patient's sister, Tara Dhillon, who states she wished to have the PEG tube inserted. Schedule for insertion of the PEG tube on tomorrow, 12/3/2020. She was informed that it may have to be delayed if patient is not stable concerning for sepsis or hypotension. He will be reevaluated in the morning. Vitals Last 24 Hours:  Patient Vitals for the past 24 hrs:   Temp Pulse Resp BP SpO2   12/05/20 1235 99.2 °F (37.3 °C) 80 20 106/63 97 %   12/05/20 0816 99.4 °F (37.4 °C) (!) 104 20 112/64 99 %   12/05/20 0510 99.4 °F (37.4 °C) 98 20 (!) 107/56 96 %   12/05/20 0335     95 %   12/05/20 0143 98.4 °F (36.9 °C) 97 20 108/80 97 %   12/04/20 2356     93 %   12/04/20 1945     96 %   12/04/20 1932     97 %   12/04/20 1918 98.2 °F (36.8 °C) 93 20 128/76 96 %   12/04/20 1649 100 °F (37.8 °C) 94 20 (!) 141/76 97 %   12/04/20 1622     98 %   12/04/20 1600  80           Discharge Exam:  General: Awake but not alert or oriented  Head:  Normocephalic, without obvious abnormality, atraumatic. Eyes:  Conjunctivae/corneas clear. Pupils equal, round, reactive to light. Extraocular movements intact. Lungs: Allow air entry diminished at the bases upper airway congestion noted but clears with cough for this reason needs frequent checks as well as oral suctioning and oral care  Chest wall: No tenderness or deformity. Heart:  Regular rate and rhythm, S1, S2 normal, no murmur, click, rub or gallop. Abdomen:  Soft, non-tender. Bowel sounds normal. No masses,  No organomegaly. Extremities: Extremities normal, atraumatic, no cyanosis or edema. Pulses: 2+ and symmetric all extremities.   Skin: Skin color, texture, turgor normal. No rashes or lesions  Neurologic: Awake but not alert or oriented    Labs:  Recent Results (from the past 24 hour(s))   GLUCOSE, POC    Collection Time: 12/04/20  4:06 PM   Result Value Ref Range    Glucose (POC) 96 65 - 100 mg/dL    Performed by Geni Wadsworth, POC    Collection Time: 12/04/20  9:17 PM   Result Value Ref Range    Glucose (POC) 106 (H) 65 - 100 mg/dL    Performed by Angelina hTurman    METABOLIC PANEL, BASIC    Collection Time: 12/05/20  6:50 AM   Result Value Ref Range    Sodium 149 (H) 136 - 145 mmol/L    Potassium 3.2 (L) 3.5 - 5.1 mmol/L    Chloride 118 (H) 97 - 108 mmol/L    CO2 22 21 - 32 mmol/L    Anion gap 9 5 - 15 mmol/L    Glucose 181 (H) 65 - 100 mg/dL    BUN 7 6 - 20 mg/dL    Creatinine 0.79 0.70 - 1.30 mg/dL    BUN/Creatinine ratio 9 (L) 12 - 20      GFR est AA >60 >60 ml/min/1.73m2    GFR est non-AA >60 >60 ml/min/1.73m2    Calcium 7.4 (L) 8.5 - 10.1 mg/dL         Imaging:  Xr Chest Port    Result Date: 12/1/2020  IMPRESSION: 1. Interval development right lower lobe consolidative infiltrate. * Discharge Condition: improved  * Disposition: SNF/LTC      Discharge Medications:  Current Discharge Medication List      START taking these medications    Details   albuterol-ipratropium (DUO-NEB) 2.5 mg-0.5 mg/3 ml nebu 3 mL by Nebulization route four (4) times daily. Qty: 30 Nebule, Refills: 0      amoxicillin-clavulanate (Augmentin) 875-125 mg per tablet 1 Tab by Per G Tube route two (2) times a day. Qty: 14 Tab, Refills: 0      pantoprazole (PROTONIX) 40 mg granules for oral suspension 40 mg by PEG Tube route daily. Qty: 30 Each, Refills: 0         CONTINUE these medications which have CHANGED    Details   clopidogreL (PLAVIX) 75 mg tab 1 Tab by PEG Tube route daily. Qty: 30 Tab, Refills: 0      atorvastatin (LIPITOR) 40 mg tablet 1 Tab by Per G Tube route nightly. Qty: 30 Tab, Refills: 0         CONTINUE these medications which have NOT CHANGED    Details   insulin lispro (HUMALOG) 100 unit/mL kwikpen by SubCUTAneous route.  Sliding andria         STOP taking these medications       lisinopriL (PRINIVIL, ZESTRIL) 10 mg tablet Comments:   Reason for Stopping:         pantoprazole (PROTONIX) 40 mg tablet Comments:   Reason for Stopping:                 * Follow-up Care/Patient Instructions: Activity: Bedrest  Diet: PEG feeding at 55ml/hr of Jevity 1.2 with 160 cc of free water flushes every 4 hours and check residuals every 8 hours  Patient needs bedside suction device and will require frequent oral care and suctioning as patient's dysphagia still causes patient to have issues with oral secretions. Follow-up Information     Follow up With Specialties Details Why Contact Info    Edwar Fleming MD Family Medicine On 12/8/2020 @ 9:00 [address] [place] [id number]  [address] [place] [address] [address]  [phone]        Spent 35 minutes evaluting and coordinating patient care of which >50% was spent coordinating and counseling.        Signed:  Indira Trimble MD  12/5/2020  2:18 PM

## 2020-12-05 NOTE — PROGRESS NOTES
After completing discharge summary nursing home informed us that they will not be able to take patient back today and case management has been informed and will reevaluate about possible discharge in a.m. tomorrow.   Continue previous active medications and continue current feedings

## 2020-12-05 NOTE — ROUTINE PROCESS
Nursing home called and stated they can't take back back tonight. Stated it is too late for them to have an admission. RAO Dubose informed.

## 2020-12-05 NOTE — PROGRESS NOTES
Problem: Pressure Injury - Risk of  Goal: *Prevention of pressure injury  Description: Document Steve Scale and appropriate interventions in the flowsheet. Outcome: Progressing Towards Goal  Note: Pressure Injury Interventions:  Sensory Interventions: Float heels    Moisture Interventions: Minimize layers, Moisture barrier         Mobility Interventions: Float heels    Nutrition Interventions: Document food/fluid/supplement intake    Friction and Shear Interventions: Minimize layers                Problem: Patient Education: Go to Patient Education Activity  Goal: Patient/Family Education  Outcome: Progressing Towards Goal     Problem: Falls - Risk of  Goal: *Absence of Falls  Description: Document Brittney Fall Risk and appropriate interventions in the flowsheet. Outcome: Progressing Towards Goal  Note: Fall Risk Interventions:       Mentation Interventions: Reorient patient, More frequent rounding         Elimination Interventions:  Toileting schedule/hourly rounds              Problem: Patient Education: Go to Patient Education Activity  Goal: Patient/Family Education  Outcome: Progressing Towards Goal

## 2020-12-05 NOTE — PROGRESS NOTES
Pt weaned down to RA overnight, a copious amount of thick white sputum was  suctioned from pt's throat, pt was able to cough and swallow when instructed, pt now able to maintain o2 sats on RA

## 2020-12-06 NOTE — ROUTINE PROCESS
Awake most of shift. No respiratory distress noted. Peg tubefeeding jevity 1.2 @ 55cc/h infusing, <20cc residual. Head of bed elevated. Wound to right heel, heel protectors.

## 2020-12-06 NOTE — DISCHARGE SUMMARY
Physician Discharge Summary       Patient: Zenaida Helton. MRN: [id number]     YOB: 1955  Age: 60 y.o. Sex: male    PCP: Nanette Escobedo MD    Allergies: Patient has no known allergies. Admit date: 12/1/2020  Admitting Provider: Brock Gorman MD    Discharge date: 12/6/2020  Discharging Provider: Brock Gorman MD    * Admission Diagnoses:   PNA (pneumonia) [J18.9]  Hypoxia [R09.02]      * Discharge Diagnoses:    Hospital Problems as of 12/6/2020 Never Reviewed          Codes Class Noted - Resolved POA    Hypoxia ICD-10-CM: R09.02  ICD-9-CM: 799.02  12/1/2020 - Present Unknown        * (Principal) PNA (pneumonia) ICD-10-CM: J18.9  ICD-9-CM: 486  12/1/2020 - Present Unknown        NELSON (acute kidney injury) ([facility] [address] ICD-10-CM: N17.9  ICD-9-CM: 584.9  12/1/2020 - Present Unknown                * Hospital Course: Shmuel Lyn Jr. is a 60 y. o. bed bound, minimally verbal male resident of LT facility, with PMH of hypertension, diabetes, glaucoma w blindness, and prior hx CVA who presents to ED for evaluation of cough. The patient is unable to provide HPI 2/2 to above mentioned factors, and so medical history gathered from sister Carolyn Guerra. She reports the nursing home called her to say the Speech Therapist indicated he was aspirating \"everything\" and \"coughing\" during mealtimes and at other times, and there was concern for pneumonia. In ED, pt afebrile, but hypotensive without tachycardia. Reportedly hypoxic on arrival and placed on 3L O2, with saturations 95%.  CXR shows RLL infiltrate, and Lactic acid level elevated at 3.0.  He was treated with IV Azithromycin and Rocephin and referred for admission.      Acute possible aspiration pneumonia  - CXR w RLL infiltrate, +cough, +hypoxia  -Patient therapy has been evaluating patient at the nursing home and has noted to the family about patient's increased coughing and aspiration risk  -Consultation and status post PEG tube placement still at high aspiration risk will need frequent oral suctioning as well as frequent residual checks  - IV ABX Zosyn + Azithromycin and on discharge will continue Augmentin 875 mg PEG twice daily for another 7 days  - BLD CX x 2 sent -shows no growth  -Continue duo nebs every 4 hours and will continue 4 times daily  -      Sepsis:  -Afebrile and normal white blood count but elevated lactic acid and hypotensive  -Blood cultures x2 obtained follow  -Continue current antibiotics of Zosyn and azithromycin  -Lactic acid initially was 3 then increased and max at 3.7 but now on downward trend at 2.6 and resolved on 12/3 at 0.9  -Patient blood pressure low on arrival and initially was given 2 L of fluid and was continued on D5 one half NS at 1 hr   -12/2: Patient blood pressure still low with systolic of 85 503 cc of normal saline bolus was given increase IV fluids to 125 we will monitor vitals closely     Covid PUI  -Resident of nursing home with hypoxia and pneumonia on x-ray  - Observe Droplet Plus Precautions  - Covid PCR pending  -If possible PEG tube placement patient may need rapid Covid testing in-house     NLESON  - BUN Cr 45/2.15 and was given several liters of fluid on admission and continued on IVF D51/2NS @ 75ml/hr  -12/2: Patient continued to be hypotensive so 5 cc normal saline bolus was given and increased fluids to 125 cc an hour BUN/creatinine improved to 42/1.59  -Patient's BUN/creatinine continues to be improved and is at 7/0.79    Hypokalemia:  -Mildly low at 3.2 and will add 20 mEq of KCl daily dosing via PEG     Diabetes  - Accuchecks Q6H  - SSI coverage if needed   - A1c 5.6     CVA  - Prior hx of CVA  - continues to have issues with dysphagia      Dysphagia  - Consult to GI and plan for peg tube placement   -Rapid Covid swab was done and was negative PCR is still pending  -Patient status post PEG tube and dietary gave recommendations for feeding and currently patient is at goal of Jevity 1.2 at 55 cc an hour and to continue with heart rate 60 cc every 6 hours and free water flushes and to do residual checks every 8 hours  Residuals have been in normal range in the last visual was noted to be 50  Patient does continue to have issues with her dysphagia and has at times issues with his saliva and would benefit from bedside suction catheter and periodic oral suctioning    Hypertension  - Hypotensive upon arrival  - Hold anti-hypertensives of lisinopril  - IVF as ordered  - Monitor BP q4hrs  -Patient status post PEG tube and blood pressure continues to be on lower normal range so we will hold lisinopril on discharge and can be reevaluated at nursing home     Elevated Lactic Acid  - Likely s/t increased tissue demand  - Treat underlying PNA  -Patient was given several fluid boluses and continued on IV fluids with lactic acid resolving on 12/3-0.9    UTI  - 2+ bacteria, but also moderate epi cells  -Urine culture showing positive for Proteus sensitive to Zosyn so we will continue Augmentin 875 mg PEG twice daily for another 7 days    * Procedures:   Procedure(s):  PERCUTANEOUS ENDOSCOPIC GASTROSTOMY TUBE INSERTION    IMPRESSION:   1. Successful PEG placement. 2.  Atrophic gastric mucosa     RECOMMENDATIONS:  1. NPO x 4 hours and nothing by tube x 4 hours except meds. At 24 hrs, feeding can be started at 30 ml per hour and advanced in 12 hours as tolerate to a goal rate decided by PMD.  2. Restraint the hands or use an abdominal binder to prevent pulling of the PEG if needed. 3. Call me (or covering GI doctor) if there is abdominal pain, bleeding, redness, fever or any issues with PEG. 4.  See care of gastrostomy tube orders in chart. 5.  No biotic prophylaxis was needed since patient is already on Zosyn    Consults:   Assessment:      1. Dysphagia  -Patient to be oral pharyngeal dysphagia and possibly secondary to extension of the previous CVA. 2.  Aspiration pneumonia  3. Suspected sepsis  4.   Acute kidney insufficiency  5. Urinary tract infection  6. History of CVA  7. Diabetes mellitus type 2  8. Dementia  Plan:      1.  I discussed the risks and benefits of insertion of a PEG tube with the patient's sister, Zettie Merlin, who states she wished to have the PEG tube inserted. Schedule for insertion of the PEG tube on tomorrow, 12/3/2020. She was informed that it may have to be delayed if patient is not stable concerning for sepsis or hypotension. He will be reevaluated in the morning. Vitals Last 24 Hours:  Patient Vitals for the past 24 hrs:   Temp Pulse Resp BP SpO2   12/06/20 0732 98.3 °F (36.8 °C) 86 20 118/75 97 %   12/06/20 0421 97.2 °F (36.2 °C) 89 20 123/73 97 %   12/06/20 0418     98 %   12/05/20 2355 97.3 °F (36.3 °C) 89 18 114/72 97 %   12/05/20 2050 98 °F (36.7 °C) 92 20 103/70 96 %   12/05/20 1919     99 %   12/05/20 1503 99.2 °F (37.3 °C) 80 20 106/63    12/05/20 1444     98 %   12/05/20 1235 99.2 °F (37.3 °C) 80 20 106/63 97 %   12/05/20 0851     98 %        Discharge Exam:  General: Awake but not alert or oriented  Head:  Normocephalic, without obvious abnormality, atraumatic. Eyes:  Conjunctivae/corneas clear. Pupils equal, round, reactive to light. Extraocular movements intact. Lungs: Allow air entry diminished at the bases upper airway congestion noted but clears with cough for this reason needs frequent checks as well as oral suctioning and oral care  Chest wall: No tenderness or deformity. Heart:  Regular rate and rhythm, S1, S2 normal, no murmur, click, rub or gallop. Abdomen:  Soft, non-tender. Bowel sounds normal. No masses,  No organomegaly. Extremities: Extremities normal, atraumatic, no cyanosis or edema. Pulses: 2+ and symmetric all extremities.   Skin: Skin color, texture, turgor normal. No rashes or lesions  Neurologic: Awake but not alert or oriented    Labs:  Recent Results (from the past 24 hour(s))   GLUCOSE, POC    Collection Time: 12/05/20 3:48 PM   Result Value Ref Range    Glucose (POC) 65 65 - 100 mg/dL    Performed by Suha Kline, POC    Collection Time: 12/05/20  8:38 PM   Result Value Ref Range    Glucose (POC) 93 65 - 100 mg/dL    Performed by Fallon West          Imaging:  Xr Chest Port    Result Date: 12/1/2020  IMPRESSION: 1. Interval development right lower lobe consolidative infiltrate. * Discharge Condition: improved  * Disposition: SNF/LTC      Discharge Medications:  Current Discharge Medication List      START taking these medications    Details   albuterol-ipratropium (DUO-NEB) 2.5 mg-0.5 mg/3 ml nebu 3 mL by Nebulization route four (4) times daily. Qty: 30 Nebule, Refills: 0      amoxicillin-clavulanate (Augmentin) 875-125 mg per tablet 1 Tab by Per G Tube route two (2) times a day. Qty: 14 Tab, Refills: 0      pantoprazole (PROTONIX) 40 mg granules for oral suspension 40 mg by PEG Tube route daily. Qty: 30 Each, Refills: 0      potassium chloride (KLOR-CON) 20 mEq pack 1 Packet by PEG Tube route daily. Qty: 30 Packet, Refills: 0         CONTINUE these medications which have CHANGED    Details   clopidogreL (PLAVIX) 75 mg tab 1 Tab by PEG Tube route daily. Qty: 30 Tab, Refills: 0      atorvastatin (LIPITOR) 40 mg tablet 1 Tab by Per G Tube route nightly. Qty: 30 Tab, Refills: 0         CONTINUE these medications which have NOT CHANGED    Details   insulin lispro (HUMALOG) 100 unit/mL kwikpen by SubCUTAneous route. Sliding andria         STOP taking these medications       lisinopriL (PRINIVIL, ZESTRIL) 10 mg tablet Comments:   Reason for Stopping:         pantoprazole (PROTONIX) 40 mg tablet Comments:   Reason for Stopping:                 * Follow-up Care/Patient Instructions:   Activity: Bedrest  Diet: PEG feeding at 55ml/hr of Jevity 1.2 with 160 cc of free water flushes every 4 hours and check residuals every 8 hours  Patient needs bedside suction device and will require frequent oral care and suctioning as patient's dysphagia still causes patient to have issues with oral secretions. Follow-up Information     Follow up With Specialties Details Why Contact Info    Eitan Taylor MD Family Medicine On 12/8/2020 @ 9:00 [address] [place] [id number]  [address] [place] [address] K[facility]  [phone]        Spent 35 minutes evaluting and coordinating patient care and dc to LTC of which >50% was spent coordinating and counseling.        Signed:  Vladimir Stewart MD  12/6/2020  2:18 PM

## 2020-12-29 PROBLEM — A41.9 SEPSIS (HCC): Status: ACTIVE | Noted: 2020-01-01

## 2020-12-29 PROBLEM — J96.91 RESPIRATORY FAILURE, UNSPECIFIED WITH HYPOXIA (HCC): Status: ACTIVE | Noted: 2020-01-01

## 2020-12-29 NOTE — ED TRIAGE NOTES
Pt arrived to ED via [facility] on CPAP. Per report pt is a resident of [facility]. Sent over for O2 sats 84% on NC after duoneb given. Per report symptoms started this morning. Pt was 94% on Cpap.

## 2020-12-29 NOTE — ROUTINE PROCESS
TRANSFER - OUT REPORT: 
 
Verbal report given to Maxinee Goodell on [facility].  being transferred to [facility] for routine progression of care Report consisted of patients Situation, Background, Assessment and  
Recommendations(SBAR). Information from the following report(s) ED Summary was reviewed with the receiving nurse. Opportunity for questions and clarification was provided. Patient transported with: 
 Registered Nurse

## 2020-12-29 NOTE — ROUTINE PROCESS
TRANSFER - IN REPORT: 
 
Verbal report received from juliana belcher on [facility].  being received fromed(unit) for routine progression of care Report consisted of patients Situation, Background, Assessment and  
Recommendations(SBAR). Information from the following report(s) SBAR was reviewed with the receiving nurse. Opportunity for questions and clarification was provided. Assessment completed upon patients arrival to unit and care assumed.

## 2020-12-29 NOTE — H&P
History and Physical 
 
Patient: Lisa Morales MRN: [id number]  SSN: [id number] YOB: 1955  Age: 60 y.o. Sex: male Subjective: Lisa Morales is a 60 y.o. male bed bound, minimally verbal resident of [facility] facility with PMH of hypertension, diabetes, glaucoma w blindness, and prior hx CVA who presents to ED for evaluation of low O2 sats and cough. The patient is unable to provide HPI 2/2 to above mentioned factors, and so pertinent history obtained from prior hospital records. In ED, pt is febrile, hypoxic with initial O2 79%, improved after being placed on BiPap per RT. sat leukocytosis and left shift. He was suctioned and oral care given for copious secretions. He does have indwelling PEG. Although CXR was initially negative for new infiltrate, and shows only resolved infiltrate from prior admission for PNA on 12/1/2020. Hospitalist consulted for Observation and further management. Past Medical History:  
Diagnosis Date  CVA (cerebral vascular accident) ([facility]  Diabetes ([facility] [facility]  Gastrointestinal disorder Feeding Tube  Glaucoma  Hypertension Past Surgical History:  
Procedure Laterality Date  HX CATARACT REMOVAL History reviewed. No pertinent family history. Social History Tobacco Use  Smoking status: Former Smoker  Smokeless tobacco: Never Used Substance Use Topics  Alcohol use: Not Currently Prior to Admission medications Medication Sig Start Date End Date Taking? Authorizing Provider  
albuterol-ipratropium (DUO-NEB) 2.5 mg-0.5 mg/3 ml nebu 3 mL by Nebulization route four (4) times daily. 12/5/20  Yes Patricia Milton MD  
clopidogreL (PLAVIX) 75 mg tab 1 Tab by PEG Tube route daily. 12/5/20  Yes Patricia Milton MD  
potassium chloride (KLOR-CON) 20 mEq pack 1 Packet by PEG Tube route daily. 12/5/20  Yes Patricia Milton MD  
insulin lispro (HUMALOG) 100 unit/mL kwikpen by SubCUTAneous route. iMlly llanese   Yes Other, MD Rohan  
  
 
No Known Allergies Review of Systems: 
Review of systems not obtained due to patient factors. Objective:  
 
Vitals:  
 12/29/20 1034 12/29/20 1046 12/29/20 1049 12/29/20 1118 BP:  105/69 Pulse:  (!) 120 (!) 119 Resp:  30 20 Temp:      
SpO2: 96% 95% Weight:    59 kg (130 lb) Physical Exam: 
GENERAL: alert, no distress, non-verbal 
THROAT & NECK: normal and no erythema or exudates noted. LUNG: rhonchi scattered bilaterally, no accessory muscle use HEART: regular rate and rhythm, S1, S2 normal, no murmur, click, rub or gallop ABDOMEN: soft, non-tender. Bowel sounds normal. No masses,  no organomegaly EXTREMITIES:  Contracted upper/lower ext SKIN: Stage II to sacrum, note pressure ulcers to bilateral feet NEUROLOGIC: Alert, non-verbal at baseline. Groans w care. PERRLA Assessment:  
 
Hospital Problems  Never Reviewed Codes Class Noted POA [facility] ICD-10-CM: A41.9 ICD-9-CM: 038.9, 995.91  12/29/2020 Unknown Respiratory failure, unspecified with hypoxia ([facility] [address] ICD-10-CM: J96.91 
ICD-9-CM: 518.81  12/29/2020 Unknown Plan:  
 
Place in OBS 
DX: Hypoxic respiratory failure ELOS 2MN Activity - Bedrest 
Diet - NPO Reconcile home meds VTE ppx - LMWH Medical surrogate - Tamar Leader [phone] Pt is a DNR/DNI Acute possible aspiration pneumonia vs Pneumonitis - CXR clear but +cough, +hypoxia, + secretions w sxn 
- Status post PEG tube placement - Jevity 1.2 at 25ml/hr increase by 10 mls Q6H until @ goal of 55ml/hr hours. Free H20 flushes 120mls Q6H increase to gaol pf 160ml, with residual checks Q6H x 24H. 
- Repeat consult to Nutrition for any updated recommendations - Aspiration precautions, frequent oral care - IV ABX Zosyn + Levaquin - Supplemental O2 prn or BiPap to keep sats >92% 
- Continue Duonebs Q4H prn Fever w Hypoxia - LTC resident - Rapid Covid swab - Observe Droplet plus precautions until cleared Diabetes - Accuchecks Q6H 
- SSI coverage  
  
CVA 
- Prior hx of CVA 
- Continue Plavix Hypertension - 's/60-70's 
- Hold ACEi, resume if SBP >140 
- Monitor BP Total time spent in consultation and coordination of care - 50 min s Signed By: Arabella Lancaster NP December 29, 2020

## 2020-12-29 NOTE — Clinical Note
Status:: INPATIENT [101]   Type of Bed: Remote Telemetry [29]   Inpatient Hospitalization Certified Necessary for the Following Reasons: 3. Patient receiving treatment that can only be provided in an inpatient setting (further clarification in H&P documentation)   Admitting Diagnosis: Sepsis (HCC) [[id number]]   Admitting Diagnosis: Respiratory failure, unspecified with hypoxia (HCC) [[id number]]   Admitting Physician: SOLANGE MANRIQUEZ [[id number]]   Attending Physician: SOLANGE MANRIQUEZ [[id number]]   Estimated Length of Stay: 3-4 Midnights   Discharge Plan:: Extended Care Facility (e.g. Adult Home, Nursing Home, etc.)

## 2020-12-29 NOTE — PROGRESS NOTES
SPEECH LANGUAGE PATHOLOGY BEDSIDE SWALLOW EVALUATION AND DISCHARGE Patient: Gerhardt Bookbinder. (63 y.o. male) Date: 12/29/2020 Primary Diagnosis: Sepsis ([facility] [address] [A41.9] Respiratory failure, unspecified with hypoxia ([facility] [address] [J96.91] Hypoxia [R09.02] Precautions: aspiration, droplet ASSESSMENT : 
Clinical beside swallow eval completed per MD orders as patient admitted for low oxygen sat and concern for possible aspiration pna. Patient observed in ED, lying in bed with eyes closed and open mouth breathing at rest.  Patient with decreased alertness, roused to noxious stimuli only. Unable to complete oral mech. Patient with absent bolus acceptance. Per nursing, patient has copious amount of thick, ropey secretions that was removed via suctioning by RT. In addition, baseline oral care was poor; nursing reports that >20 minutes were spent removing secretions from oral cavity. Upon removal of secretions oxygen sats improved. Patient is not appropriate for PO intake at this time and should continue alternative means of nutrition until (and only if) mentation improves. PLAN : 
Recommendations and Planned Interventions: 
-NPO. Consider minimal ice chips for pleasure to preserve oral mucosa if mentation improves. -Highly recommend thorough and consistent oral care; patient is dependent 
-No formal ST needs for dysphagia indicated at this time. -SLP available for re-evaluation if indicated by MD. Discharge Recommendations: Romero Sawyer SUBJECTIVE:  
Patient lying in bed, largely unresponsive. Nursing states that he made some verbalizations during oral care. OBJECTIVE:  
 
Past Medical History:  
Diagnosis Date  CVA (cerebral vascular accident) ([facility] [address]  Diabetes ([facility] [address]  Gastrointestinal disorder Feeding Tube  Glaucoma  Hypertension Past Surgical History:  
Procedure Laterality Date  HX CATARACT REMOVAL Home Situation:  
  
 
Diet prior to admission: NPO Current Diet:  NPO Cognitive and Communication Status: 
Neurologic State: Responds to noxious stimuli only Orientation Level: Unable to verbalize Cognition: Unable to assess (comment) Oral Assessment: 
Oral Assessment Labial: (Open mouth posture at rest) Dentition: Poor Oral Hygiene: Poor P.O. Trials: 
Patient Position: Lying in bed, contracted Vocal quality prior to P.O.:   
Consistency Presented: Other (comment)(No consistency presented) Bolus Acceptance: Absent PAIN: 
Pain level pre-treatment: 0/10 Pain level post-treatment: 0/10 Pain Intervention(s): Medication (see MAR); Rest, Ice, Repositioning\ Response to intervention: none required After evaluation:  
[]            Patient left in no apparent distress sitting up in chair 
[x]            Patient left in no apparent distress in bed 
[]            Call bell left within reach [x]            Nursing notified []            Family present 
[]            Caregiver present 
[]            Bed alarm activated COMMUNICATION/EDUCATION:  
[]            Aspiration precautions; swallow safety; compensatory techniques. []            Patient/family have participated as able in goal setting and plan of care. []            Patient/family agree to work toward stated goals and plan of care. []            Patient understands intent and goals of therapy; neutral about participation. [x]            Patient unable to participate in goal setting/plan of care; educ ongoing with interdisciplinary staff 
[]         Posted safety precautions in patient's room. Thank you for this referral. 
DELANO Gr Time Calculation: 20 mins

## 2020-12-29 NOTE — ED PROVIDER NOTES
EMERGENCY DEPARTMENT HISTORY AND PHYSICAL EXAM 
 
 
Date: 12/29/2020 Patient Name: Tejas Hu. History of Presenting Illness Chief Complaint Patient presents with  Respiratory Distress History Provided By: EMS 
 
HPI: Tejas Hu., 60 y.o. male with a past medical history significant diabetes, hypertension and stroke presents to the ED with cc of low oxygen saturation and cough There are no other complaints, changes, or physical findings at this time. PCP: Cassia Calvo MD 
 
Current Facility-Administered Medications Medication Dose Route Frequency Provider Last Rate Last Admin  sodium chloride 0.9 % bolus infusion 1,000 mL  1,000 mL IntraVENous ONCE Alicja Ma MD      
 levoFLOXacin (LEVAQUIN) 750 mg in D5W IVPB  750 mg IntraVENous NOW Nia Irvin MD      
 
Current Outpatient Medications Medication Sig Dispense Refill  albuterol-ipratropium (DUO-NEB) 2.5 mg-0.5 mg/3 ml nebu 3 mL by Nebulization route four (4) times daily. 30 Nebule 0  
 amoxicillin-clavulanate (Augmentin) 875-125 mg per tablet 1 Tab by Per G Tube route two (2) times a day. 14 Tab 0  
 pantoprazole (PROTONIX) 40 mg granules for oral suspension 40 mg by PEG Tube route daily. 30 Each 0  
 clopidogreL (PLAVIX) 75 mg tab 1 Tab by PEG Tube route daily. 30 Tab 0  
 atorvastatin (LIPITOR) 40 mg tablet 1 Tab by Per G Tube route nightly. 30 Tab 0  
 potassium chloride (KLOR-CON) 20 mEq pack 1 Packet by PEG Tube route daily. 30 Packet 0  
 insulin lispro (HUMALOG) 100 unit/mL kwikpen by SubCUTAneous route. Sliding andria Past History Past Medical History: 
Past Medical History:  
Diagnosis Date  CVA (cerebral vascular accident) ([facility] [address]  Diabetes ([facility] [address] [facility]  Glaucoma  Hypertension Past Surgical History: 
Past Surgical History:  
Procedure Laterality Date  HX CATARACT REMOVAL Family History: 
History reviewed.  No pertinent family history. Social History: 
Social History Tobacco Use  Smoking status: Former Smoker  Smokeless tobacco: Never Used Substance Use Topics  Alcohol use: Not Currently  Drug use: Not on file Allergies: 
No Known Allergies Review of Systems Review of Systems Unable to perform ROS: Other Constitutional: Negative for chills and fever. HENT: Negative for rhinorrhea and sore throat. Eyes: Negative for discharge and visual disturbance. Respiratory: Positive for cough and shortness of breath. Gastrointestinal: Negative for diarrhea and vomiting. Genitourinary: Negative for flank pain and hematuria. Musculoskeletal: Negative for back pain and neck stiffness. Skin: Positive for pallor. Negative for wound. Neurological: Negative for seizures and syncope. Physical Exam  
 
Physical Exam 
Vitals signs and nursing note reviewed. Constitutional:   
   Appearance: He is cachectic. HENT:  
   Head: Normocephalic and atraumatic. Mouth/Throat:  
   Mouth: Mucous membranes are moist.  
Eyes:  
   Extraocular Movements: Extraocular movements intact. Conjunctiva/sclera: Conjunctivae normal.  
   Pupils: Pupils are equal, round, and reactive to light. Neck: Musculoskeletal: Normal range of motion and neck supple. Cardiovascular:  
   Rate and Rhythm: Regular rhythm. Tachycardia present. Heart sounds: Normal heart sounds. Pulmonary:  
   Effort: Tachypnea present. Breath sounds: Decreased air movement present. Examination of the right-middle field reveals decreased breath sounds. Examination of the left-middle field reveals decreased breath sounds. Decreased breath sounds present. No wheezing or rales. Abdominal:  
   General: Abdomen is flat. Bowel sounds are decreased. Palpations: Abdomen is soft. Tenderness: There is no abdominal tenderness. Musculoskeletal:  
   Comments: Lower extremity contraction Skin: 
   General: Skin is warm and dry. Capillary Refill: Capillary refill takes more than 3 seconds. Neurological:  
   Mental Status: He is lethargic. Lab and Diagnostic Study Results Labs - No results found for this or any previous visit (from the past 12 hour(s)). Radiologic Studies -  
[unfilled] CT Results  (Last 48 hours) None CXR Results  (Last 48 hours) None Medical Decision Making and ED Course - I am the first and primary provider for this patient AND AM THE PRIMARY PROVIDER OF RECORD. - I reviewed the vital signs, available nursing notes, past medical history, past surgical history, family history and social history. - Initial assessment performed. The patients presenting problems have been discussed, and the staff are in agreement with the care plan formulated and outlined with them. I have encouraged them to ask questions as they arise throughout their visit. Vital Signs-Reviewed the patient's vital signs. Patient Vitals for the past 12 hrs: 
 Pulse Resp BP SpO2  
12/29/20 0953    90 % 12/29/20 0948 (!) 128 (!) 38 108/77 (!) 79 % Records Reviewed: Nursing Notes The patient presents with shortness of breath with a differential diagnosis of pneumonia, CHF, foreign body ED Course:  
 
 
ED Course as of Dec 29 1108 Tue Dec 29, 2020  
1050 pH(!): 7.52 [SB] 1051 PCO2(!): 29 [SB] 1051 PO2: 71 [SB] 1051 WBC(!): 13.9 [SB] 1051 NEUTROPHILS(!): 81 [SB] 1051 Nitrites: Negative [SB] 1051 Leukocyte Esterase: Negative [SB] 1103 EKG rate sinus tach 123 HI interval 151 QT interval 442 ST depressions in leads II, III,aVF 
 
 
 
 
 
 
 
 
 
 
 
 
 
 
 
 
 
 
 
 
 
 
 
 
 
  
 [SB] ED Course User Index 
[SB] Andi Elizondo MD  
 
 
 
Provider Notes (Medical Decision Making): MDM Consultations:  
 
 
Consultations: - NONE Procedures and Critical Care Performed by:  Diann Lindo MD 
PROCEDURES: Procedures Lexa Nichols MD 
 
 
 
Disposition Disposition: Condition stable and improved Admitted to Floor Medical Floor the case was discussed with the admitting physician Dr. Mac Sit Remove if not discharged DISCHARGE PLAN: 
1. Current Discharge Medication List  
  
CONTINUE these medications which have NOT CHANGED Details  
albuterol-ipratropium (DUO-NEB) 2.5 mg-0.5 mg/3 ml nebu 3 mL by Nebulization route four (4) times daily. Qty: 30 Nebule, Refills: 0  
  
amoxicillin-clavulanate (Augmentin) 875-125 mg per tablet 1 Tab by Per G Tube route two (2) times a day. Qty: 14 Tab, Refills: 0  
  
pantoprazole (PROTONIX) 40 mg granules for oral suspension 40 mg by PEG Tube route daily. Qty: 30 Each, Refills: 0  
  
clopidogreL (PLAVIX) 75 mg tab 1 Tab by PEG Tube route daily. Qty: 30 Tab, Refills: 0  
  
atorvastatin (LIPITOR) 40 mg tablet 1 Tab by Per G Tube route nightly. Qty: 30 Tab, Refills: 0  
  
potassium chloride (KLOR-CON) 20 mEq pack 1 Packet by PEG Tube route daily. Qty: 30 Packet, Refills: 0  
  
insulin lispro (HUMALOG) 100 unit/mL kwikpen by SubCUTAneous route. Sliding andria 2. Follow-up Information None 3. Return to ED if worse 4. Current Discharge Medication List  
  
 
 
Diagnosis Clinical Impression: No diagnosis found. Attestations: 
 
Lexa Nichols MD 
 
Please note that this dictation was completed with [email or web address], the computer voice recognition software. Quite often unanticipated grammatical, syntax, homophones, and other interpretive errors are inadvertently transcribed by the computer software. Please disregard these errors. Please excuse any errors that have escaped final proofreading. Thank you.

## 2020-12-30 NOTE — PROGRESS NOTES
Attempted to visit patient in 2 acute care [place] [id number] unit during rounds. Patient seemed to be resting or sleeping. I provided silent support outside the patient room. No family members were present. Chaplains will follow up if further referrals are requested.  
 
Chaplain Dominique Gomez M.Div.

## 2020-12-30 NOTE — ROUTINE PROCESS
Checked peg tube residual ,10cc noted. tubefeeding increased to 45cc/h and water flushes increased to 145cc

## 2020-12-30 NOTE — PROGRESS NOTES
Comprehensive Nutrition Assessment Type and Reason for Visit: Initial 
 
Nutrition Recommendations/Plan:  
 
Continue w/ TF Start Prosource at [facility] 496 Rec' MVI Request a Phosphorus & Pre-albumin lab Monitor & Record daily wts & residuals Nutrition Assessment:  60 y.o. male bed bound, minimally verbal resident of [facility] facility with PMH of hypertension, diabetes, glaucoma w blindness, and prior hx CVA who presents to ED for evaluation of low O2 sats and cough. Noted to have poor oral care in the ED. Pt was recently admitted x3wk (12/3) for a peg tube placement. TF rec's from 12/3 as the following: continous Jevity 1.2 @ 55ml w/ water flushes [id number], noted to have tolerated w/o no complications. Re-evaulated current nutrition status, noted a wt. change of 7.6 x1m ( per RD's previous noted pt's was weight on bed scale at 130lbs (12/3), noted during current admitted weight 119lbs on bed scale (12/29), RD & CNA weight pt on bedscale this moring at 120lbs),  observed fat and muscle wasting, ill-appearing, reported to have stage 2 pressure ulcers on sacrum and bilateral toes. ??? unknown unitentionally wt. loss r/t discharge TF rec's provided 1,584/day. Notified MD & -  stated will reach out to [facility] to find out pt's TF at [facility]- pending to hear back. Nutrition related lab values: glucose (255), Na (148), BUN (35), alb (1.9), total protien (7.9), A1C (5.6- 12/1). Meds: humalog. Malnutrition Assessment: 
Malnutrition Status:  Severe malnutrition Context:  Chronic illness Findings of the 6 clinical characteristics of malnutrition:  
Energy Intake:  Unable to assess Weight Loss:  7 - Greater than 5% over 1 month Body Fat Loss:  7 - Severe body fat loss, Triceps, Buccal region Muscle Mass Loss:  7 - Severe muscle mass loss, Calf (gastrocnemius), Scapula (trapezius), Thigh (quadraceps), Clavicles (pectoralis &deltoids) Fluid Accumulation:  Unable to assess,   
 Strength:  Not performed Estimated Daily Nutrient Needs: 
Energy (kcal): 1,588kcals ( 1,134. 2BMR 1.4); Weight Used for Energy Requirements: Current Protein (g): 81g (1.5g/kg); Weight Used for Protein Requirements: Current Fluid (ml/day): 1,730ml (32ml/kg); Method Used for Fluid Requirements:   
 
 
Nutrition Related Findings:  cogition: alert, noncommunicative. Digestive system: abdomen: soft, non-tender, no reported n/c, v/d, oral dysphagia. extremities/muscle/bone: bedbound, fat and muscle wasting observed. Skin: stage II pressure ucler Wounds:   
Stage II(bilateral toes, sacrum) Current Nutrition Therapies: 
Current Tube Feeding (TF) Orders: · Feeding Route: PEG 
· Formula: Jevity 1.2 · Schedule:Continuous · Regimen: 55ml · Additives/Modulars: Protein, Wound healing(prosource x2/day) · Water Flushes: [id number] · Goal TF & Flush Orders Provides: Goal rate: provides total kcals (1,584kcals), provide total protien (73g), provide total water w/ water flushes ( 1,705ml). Goal rate w/ Prosource x2/day 1,704kcals, 103g pro, 1,825ml Anthropometric Measures: 
· Height:  4' 9\" (144.8 cm) · Current Body Wt:  54.4 kg (120 lb) · Admission Body Wt:  119 lb · Usual Body Wt:  59 kg (130 lb) · Ideal Body Wt:  88 lbs:  136.4 % · Adjusted Body Weight:   ; Weight Adjustment for: No adjustment · Adjusted BMI:      
· BMI Category: Overweight (BMI 25.0-29. 9) Nutrition Diagnosis:  
· Unintended weight loss related to inadequate enteral nutrition infusion as evidenced by weight loss greater than or equal to 5% in 1 month · Severe malnutrition related to increased demand for energy/nutrients as evidenced by severe loss of subcutaneous fat, severe muscle loss, weight loss greater than or equal to 5% in 1 month · Increased nutrient needs related to increased demand for energy/nutrients as evidenced by lab values(indicates dehydration) Nutrition Interventions:  
Food and/or Nutrient Delivery: Continue tube feeding, Start oral nutrition supplement(prosource x2/day ( AM & H.S. Med pass)) Nutrition Education and Counseling: No recommendations at this time Coordination of Nutrition Care: Continue to monitor while inpatient, Interdisciplinary rounds Goals: 
tolerable TF- residuals <200ml, Wt. gain 1-2kg x1wk Nutrition Monitoring and Evaluation:  
Behavioral-Environmental Outcomes: None identified Food/Nutrient Intake Outcomes: Enteral nutrition intake/tolerance Physical Signs/Symptoms Outcomes: Weight Discharge Planning:   
No discharge needs at this time

## 2020-12-30 NOTE — ROUTINE PROCESS
O2 on per nc. PEG intact with TF. MA intact with AB. Incont. Pt moans when turned and repositioned. Monitor on. Heel protectors on. meriplex to sacral in place.

## 2020-12-30 NOTE — ROUTINE PROCESS
Peg tube checked for residual, < 10cc noted, Jevity 1.2 increased to 35cc/h and water flush increased to 135cc

## 2020-12-30 NOTE — PROGRESS NOTES
Spoke with Nusrat Hernandez, Dietician, and gave her the patient's TF records received from [facility]

## 2020-12-30 NOTE — PROGRESS NOTES
Progress Note Date:12/30/2020       Room:205/01 Patient Doyle Gomez. YOB: 1955     Age:65 y.o. Subjective As per Admitting provider noted Tarun Oconnorfer. is a 60 y.o. male bed bound, minimally verbal resident of [facility] facility with PMH of hypertension, diabetes, glaucoma w blindness, and prior hx CVA who presents to ED for evaluation of low O2 sats and cough. The patient is unable to provide HPI 2/2 to above mentioned factors, and so pertinent history obtained from prior hospital records. In ED, pt is febrile, hypoxic with initial O2 79%, improved after being placed on BiPap per RT. sat leukocytosis and left shift. He was suctioned and oral care given for copious secretions. He does have indwelling PEG. Although CXR was initially negative for new infiltrate, and shows only resolved infiltrate from prior admission for PNA on 12/1/2020. Hospitalist consulted for Observation and further management. Patient seen and evaluated awakens but not alert or oriented patient is afebrile has improved O2 saturation and continues on 2 L but saturating 99% patient currently tolerating PEG tube feeds and has reached goal continue to have no residuals noted. Continue oral care Review of Systems Unable to perform ROS: Dementia Objective Vitals Last 24 Hours: 
Patient Vitals for the past 24 hrs: 
 Temp Pulse Resp BP SpO2  
12/30/20 1533 98.1 °F (36.7 °C) (!) 104 22 111/71 99 % 12/30/20 1530     97 % 12/30/20 1200  (!) 112     
12/30/20 1121 97.5 °F (36.4 °C) (!) 109 20 103/62 95 % 12/30/20 0807     94 % 12/30/20 0800  100     
12/30/20 0703 98.2 °F (36.8 °C) (!) 108 19 (!) 92/59 97 % 12/30/20 0501 97.9 °F (36.6 °C) (!) 107 20 (!) 95/57 97 % 12/30/20 0400  (!) 104     
12/30/20 0021 100.2 °F (37.9 °C) (!) 117 22 (!) 87/56 95 % 12/29/20 2357  (!) 115     
12/29/20 2000  (!) 107     
12/29/20 1943     92 % 12/29/20 1908 98.3 °F (36.8 °C) (!) 107 22 93/60 93 % 12/29/20 1726 97.1 °F (36.2 °C) (!) 106 22 99/67 96 % 12/29/20 1657 97.1 °F (36.2 °C) (!) 106 22 99/67 96 % 12/29/20 1619     97 % I/O (24Hr): No intake or output data in the 24 hours ending 12/30/20 1613 Physical Exam: 
General: Alert, cooperative, no distress, appears stated age. Head:  Normocephalic, without obvious abnormality, atraumatic. Eyes:  Conjunctivae/corneas clear. Pupils equal, round, reactive to light. Extraocular movements intact. Lungs:  Clear to auscultation bilaterally. no wheeze, rales, crackles, rhonchi Chest wall: No tenderness or deformity. Heart:  Regular rate and rhythm, S1, S2 normal, no murmur, click, rub or gallop. Abdomen:  Soft, non-tender. Bowel sounds normal. No masses,  No organomegaly. Extremities: Extremities normal, atraumatic, no cyanosis or edema. Pulses: 2+ and symmetric all extremities. Skin: Skin color, texture, turgor normal. No rashes or lesions Neurologic: Awake, Alert, oriented. No obvious gross sensory or motor deficits Medications Current Facility-Administered Medications Medication Dose Route Frequency  piperacillin-tazobactam (ZOSYN) 3.375 g in 0.9% sodium chloride (MBP/ADV) 100 mL MBP  3.375 g IntraVENous Q8H  
 levoFLOXacin (LEVAQUIN) 750 mg in D5W IVPB  750 mg IntraVENous Q24H  
 albuterol-ipratropium (DUO-NEB) 2.5 MG-0.5 MG/3 ML  3 mL Nebulization QID RT  
 glucose chewable tablet 16 g  4 Tab Oral PRN  
 dextrose (D50W) injection syrg 12.5-25 g  25-50 mL IntraVENous PRN  
 glucagon (GLUCAGEN) injection 1 mg  1 mg IntraMUSCular PRN  
 insulin lispro (HUMALOG) injection   SubCUTAneous Q6H  
 clopidogreL (PLAVIX) tablet 75 mg  75 mg PEG Tube DAILY  sodium chloride (NS) flush 5-40 mL  5-40 mL IntraVENous Q8H  
 sodium chloride (NS) flush 5-40 mL  5-40 mL IntraVENous PRN  
 acetaminophen (TYLENOL) tablet 650 mg  650 mg Oral Q6H PRN  Or  
 acetaminophen (TYLENOL) suppository 650 mg  650 mg Rectal Q6H PRN  polyethylene glycol (MIRALAX) packet 17 g  17 g Oral DAILY PRN  promethazine (PHENERGAN) tablet 12.5 mg  12.5 mg Oral Q6H PRN Or  
 ondansetron (ZOFRAN) injection 4 mg  4 mg IntraVENous Q6H PRN  
 enoxaparin (LOVENOX) injection 40 mg  40 mg SubCUTAneous DAILY Allergies Patient has no known allergies. Labs/Imaging/Diagnostics Labs: 
Recent Results (from the past 48 hour(s)) EKG, 12 LEAD, INITIAL Collection Time: 12/29/20 10:02 AM  
Result Value Ref Range Ventricular Rate 123 BPM  
 Atrial Rate 122 BPM  
 P-R Interval 151 ms QRS Duration 65 ms  
 Q-T Interval 309 ms QTC Calculation (Bezet) 442 ms Calculated P Axis 82 degrees Calculated R Axis 61 degrees Calculated T Axis 86 degrees Diagnosis Sinus tachycardia Consider anterior infarct BLOOD GAS, ARTERIAL Collection Time: 12/29/20 10:06 AM  
Result Value Ref Range pH 7.52 (H) 7.35 - 7.45    
 PCO2 29 (L) 35 - 45 mmHg PO2 71 70 - 100 mmHg BICARBONATE 23 22 - 26 mmol/L  
 BASE EXCESS 0.6 0 - 2 mmol/L  
 O2 METHOD BiPAP    
 FIO2 60.0 % SPONTANEOUS RATE 14 PRESSURE SUPPORT 6    
 EPAP/CPAP/PEEP 6 Sample source Arterial    
 SITE Right Radial    
 ANNA'S TEST PASS Carboxy-Hgb 0.3 0 - 5 % Methemoglobin 0.3 0 - 5 %  
 tHb 11.4 (L) 13.5 - 17.5 g/dL Oxyhemoglobin 94.0 (L) 95 - 100 % URINALYSIS W/ RFLX MICROSCOPIC Collection Time: 12/29/20 10:15 AM  
Result Value Ref Range Color Yellow Appearance Clear Clear Specific gravity 1.025 1.003 - 1.030    
 pH (UA) 5.5 5.0 - 8.0 Protein Trace (A) Negative mg/dL Glucose Negative Negative mg/dL Ketone Negative Negative mg/dL Bilirubin Negative Negative Blood Negative Negative Urobilinogen 1.0 0.2 - 1.0 EU/dL Nitrites Negative Negative Leukocyte Esterase Negative Negative WBC 0-4 0 - 5 /hpf  
 RBC 0-5 0 - 3 /hpf  Bacteria 2+ (A) Negative /hpf Amorphous Crystals 2+ (A) Negative CBC WITH AUTOMATED DIFF Collection Time: 12/29/20 10:17 AM  
Result Value Ref Range WBC 13.9 (H) 4.4 - 11.3 K/uL  
 RBC 3.60 (L) 4.50 - 5.90 M/uL  
 HGB 10.2 (L) 13.5 - 17.5 g/dL HCT 32.3 (L) 41 - 53 % MCV 89.5 80 - 100 FL  
 MCH 28.4 (L) 31 - 34 PG  
 MCHC 31.7 31.0 - 36.0 g/dL  
 RDW 15.7 (H) 11.5 - 14.5 % PLATELET 003 K/uL MPV 8.5 6.5 - 11.5 FL  
 NRBC 0.0  WBC ABSOLUTE NRBC 0.01 K/uL NEUTROPHILS 81 (H) 42 - 75 % LYMPHOCYTES 10 (L) 20.5 - 51.1 % MONOCYTES 8 1.7 - 9.3 % EOSINOPHILS 0 (L) 0.9 - 2.9 % BASOPHILS 1 0.0 - 2.5 % ABS. NEUTROPHILS 11.3 (H) 1.8 - 7.7 K/UL  
 ABS. LYMPHOCYTES 1.4 1.0 - 4.8 K/UL  
 ABS. MONOCYTES 1.1 0.2 - 2.4 K/UL  
 ABS. EOSINOPHILS 0.0 0.0 - 0.7 K/UL  
 ABS. BASOPHILS 0.1 0.0 - 0.2 K/UL PROTHROMBIN TIME + INR Collection Time: 12/29/20 10:17 AM  
Result Value Ref Range Prothrombin time 11.5 (H) 9.0 - 11.1 sec INR 1.2 (H) 0.9 - 1.1 METABOLIC PANEL, COMPREHENSIVE Collection Time: 12/29/20 10:17 AM  
Result Value Ref Range Sodium 148 (H) 136 - 145 mmol/L Potassium 4.2 3.5 - 5.1 mmol/L Chloride 113 (H) 97 - 108 mmol/L  
 CO2 24 21 - 32 mmol/L Anion gap 11 5 - 15 mmol/L Glucose 255 (H) 65 - 100 mg/dL BUN 35 (H) 6 - 20 mg/dL Creatinine 0.93 0.70 - 1.30 mg/dL BUN/Creatinine ratio 38 (H) 12 - 20 GFR est AA >60 >60 ml/min/1.73m2 GFR est non-AA >60 >60 ml/min/1.73m2 Calcium 8.7 8.5 - 10.1 mg/dL Bilirubin, total 0.4 0.2 - 1.0 mg/dL AST (SGOT) 32 15 - 37 U/L  
 ALT (SGPT) 40 12 - 78 U/L Alk. phosphatase 98 45 - 117 U/L Protein, total 7.9 6.4 - 8.2 g/dL Albumin 1.9 (L) 3.5 - 5.0 g/dL Globulin 6.0 (H) 2.0 - 4.0 g/dL A-G Ratio 0.3 (L) 1.1 - 2.2    
TROPONIN I Collection Time: 12/29/20 10:17 AM  
Result Value Ref Range Troponin-I, Qt. <0.05 <0.05 ng/mL LIPASE Collection Time: 12/29/20 10:17 AM  
Result Value Ref Range Lipase 67 (L) 73 - 393 U/L  
LACTIC ACID Collection Time: 12/29/20 10:17 AM  
Result Value Ref Range Lactic acid 1.8 0.4 - 2.0 mmol/L MAGNESIUM Collection Time: 12/29/20 10:17 AM  
Result Value Ref Range Magnesium 2.5 (H) 1.6 - 2.4 mg/dL SARS-COV-2 Collection Time: 12/29/20  5:25 PM  
Result Value Ref Range SARS-CoV-2 Please find results under separate order COVID-19 RAPID TEST Collection Time: 12/29/20  5:25 PM  
Result Value Ref Range Specimen source Nasopharyngeal    
 COVID-19 rapid test Not Detected Not Detected GLUCOSE, POC Collection Time: 12/29/20  5:39 PM  
Result Value Ref Range Glucose (POC) 203 (H) 65 - 100 mg/dL Performed by Brenna Colin, POC Collection Time: 12/29/20 11:47 PM  
Result Value Ref Range Glucose (POC) 183 (H) 65 - 100 mg/dL Performed by Eliodoro Nageotte, POC Collection Time: 12/30/20  5:55 AM  
Result Value Ref Range Glucose (POC) 229 (H) 65 - 100 mg/dL Performed by Eliodoro Nageotte, POC Collection Time: 12/30/20 11:19 AM  
Result Value Ref Range Glucose (POC) 165 (H) 65 - 100 mg/dL Performed by "[employer] Imaging: Xr Chest [facility] Pleitez Result Date: 12/29/2020 Impression: The cardiomediastinal silhouette is appropriate for age, technique, and lung expansion. 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 The overlying consolidation has cleared. Pulmonary vasculature is not congested. The lungs are essentially clear. No effusion or pneumothorax is seen. Assessment//Plan Problem List: 
Hospital Problems  Never Reviewed Codes Class Noted POA Sepsis ([facility] [address] ICD-10-CM: A41.9 ICD-9-CM: 038.9, 995.91  12/29/2020 Unknown Respiratory failure, unspecified with hypoxia ([facility] [address] ICD-10-CM: J96.91 
ICD-9-CM: 518.81  12/29/2020 Unknown Hypoxia ICD-10-CM: R09.02 
ICD-9-CM: 799.02  12/1/2020 Unknown Acute possible aspiration pneumonia vs Pneumonitis - CXR clear but +cough, +hypoxia, + secretions w sxn but unsure if patient had any vomiting episode and patient improved significantly with deep suctioning and oral care - Status post PEG tube placement recently - Jevity 1.2 at 25ml/hr increase by 10 mls Q6H until @ goal of 55ml/hr hours. Free H20 flushes 120mls Q6H increase to gaol pf 160ml, with residual checks Q6H x 24H. 
- Repeat consult to Nutrition for any updated recommendations - Aspiration precautions, frequent oral care - IV ABX Zosyn + Levaquin - Supplemental O2 prn or BiPap to keep sats >92% 
- Continue Duonebs Q4H prn 
  
Fever w Hypoxia - LTC resident - Rapid Covid swab pending - Observe Droplet plus precautions until cleared  
-Patient has been tapered down to 2 L with good O2 saturation 99% will need to find out from nursing home if patient is on chronic O2 
  
Diabetes - Accuchecks Q6H 
- SSI coverage  
  
CVA 
- Prior hx of CVA 
- Continue Plavix  
  
Hypertension - 's/60-70's 
- Hold ACEi, resume if SBP >140 
- Monitor BP 
  
 
DNR Spent 30 minutes evaluting and coordinating patient care of which >50% was spent coordinating and counseling. ' 
 
Electronically signed by Indira Trimble MD on 12/30/2020 at 4:13 PM

## 2020-12-30 NOTE — PROGRESS NOTES
Spoke with Ms. Bruce with [facility] for APS referral in regards to concerns voiced during rounds to include: recent weight loss, pressure ulcer to sacrum and two toes, and concern for poor oral care.

## 2020-12-31 PROBLEM — R09.02 HYPOXIC: Status: ACTIVE | Noted: 2020-01-01

## 2020-12-31 NOTE — PROGRESS NOTES
Progress Note Date:12/31/2020       Room:205/01 Patient Jocelyne James. YOB: 1955     Age:65 y.o. Subjective As per Admitting provider noted Tico Mills. is a 60 y.o. male bed bound, minimally verbal resident of [facility] facility with PMH of hypertension, diabetes, glaucoma w blindness, and prior hx CVA who presents to ED for evaluation of low O2 sats and cough. The patient is unable to provide HPI 2/2 to above mentioned factors, and so pertinent history obtained from prior hospital records. In ED, pt is febrile, hypoxic with initial O2 79%, improved after being placed on BiPap per RT. sat leukocytosis and left shift. He was suctioned and oral care given for copious secretions. He does have indwelling PEG. Although CXR was initially negative for new infiltrate, and shows only resolved infiltrate from prior admission for PNA on 12/1/2020. Hospitalist consulted for Observation and further management. Patient seen and evaluated awakens but not alert or oriented patient is afebrile has improved O2 saturation and currently is on RA. Family wishes for alternative placement and case management evaluating and working on discharge planning. Review of Systems Unable to perform ROS: Dementia Objective Vitals Last 24 Hours: 
Patient Vitals for the past 24 hrs: 
 Temp Pulse Resp BP SpO2  
12/31/20 1657 97.7 °F (36.5 °C) (!) 104 18 112/75 94 % 12/31/20 1529     98 % 12/31/20 1147  (!) 107     
12/31/20 1133 97.4 °F (36.3 °C) (!) 107 22 (!) 95/40 100 % 12/31/20 1120     95 % 12/31/20 0800  (!) 108     
12/31/20 0736     98 % 12/31/20 0703 98.1 °F (36.7 °C) (!) 108 22 102/64 99 % 12/31/20 0532 97.1 °F (36.2 °C) 90 20 104/63 100 % 12/31/20 0400  (!) 108     
12/31/20 0017 97.1 °F (36.2 °C) (!) 104 20 109/72 96 % 12/31/20 0000  (!) 105     
12/30/20 2010 98.4 °F (36.9 °C) (!) 101 20 107/67 100 % 12/30/20 2000  (!) 106     
12/30/20 1951     97 % I/O (24Hr): Intake/Output Summary (Last 24 hours) at 12/31/2020 1701 Last data filed at 12/31/2020 1511 Gross per 24 hour Intake 180 ml Output  Net 180 ml Physical Exam: 
General: Alert, cooperative, no distress, appears stated age. Head:  Normocephalic, without obvious abnormality, atraumatic. Eyes:  Conjunctivae/corneas clear. Pupils equal, round, reactive to light. Extraocular movements intact. Lungs:  Clear to auscultation bilaterally. no wheeze, rales, crackles, rhonchi Chest wall: No tenderness or deformity. Heart:  Regular rate and rhythm, S1, S2 normal, no murmur, click, rub or gallop. Abdomen:  Soft, non-tender. Bowel sounds normal. No masses,  No organomegaly. Extremities: Extremities normal, atraumatic, no cyanosis or edema. Pulses: 2+ and symmetric all extremities. Skin: Skin color, texture, turgor normal. No rashes or lesions Neurologic: Awake, Alert, oriented. No obvious gross sensory or motor deficits Medications Current Facility-Administered Medications Medication Dose Route Frequency  piperacillin-tazobactam (ZOSYN) 3.375 g in 0.9% sodium chloride (MBP/ADV) 100 mL MBP  3.375 g IntraVENous Q8H  
 levoFLOXacin (LEVAQUIN) 750 mg in D5W IVPB  750 mg IntraVENous Q24H  
 albuterol-ipratropium (DUO-NEB) 2.5 MG-0.5 MG/3 ML  3 mL Nebulization QID RT  
 glucose chewable tablet 16 g  4 Tab Oral PRN  
 dextrose (D50W) injection syrg 12.5-25 g  25-50 mL IntraVENous PRN  
 glucagon (GLUCAGEN) injection 1 mg  1 mg IntraMUSCular PRN  
 insulin lispro (HUMALOG) injection   SubCUTAneous Q6H  
 clopidogreL (PLAVIX) tablet 75 mg  75 mg PEG Tube DAILY  sodium chloride (NS) flush 5-40 mL  5-40 mL IntraVENous Q8H  
 sodium chloride (NS) flush 5-40 mL  5-40 mL IntraVENous PRN  
 acetaminophen (TYLENOL) tablet 650 mg  650 mg Oral Q6H PRN  Or  
 acetaminophen (TYLENOL) suppository 650 mg  650 mg Rectal Q6H PRN  
 polyethylene glycol (MIRALAX) packet 17 g  17 g Oral DAILY PRN  promethazine (PHENERGAN) tablet 12.5 mg  12.5 mg Oral Q6H PRN Or  
 ondansetron (ZOFRAN) injection 4 mg  4 mg IntraVENous Q6H PRN  
 enoxaparin (LOVENOX) injection 40 mg  40 mg SubCUTAneous DAILY Allergies Patient has no known allergies. Labs/Imaging/Diagnostics Labs: 
Recent Results (from the past 48 hour(s)) SARS-COV-2 Collection Time: 12/29/20  5:25 PM  
Result Value Ref Range SARS-CoV-2 Please find results under separate order COVID-19 RAPID TEST Collection Time: 12/29/20  5:25 PM  
Result Value Ref Range Specimen source Nasopharyngeal    
 COVID-19 rapid test Not Detected Not Detected GLUCOSE, POC Collection Time: 12/29/20  5:39 PM  
Result Value Ref Range Glucose (POC) 203 (H) 65 - 100 mg/dL Performed by James Rea, POC Collection Time: 12/29/20 11:47 PM  
Result Value Ref Range Glucose (POC) 183 (H) 65 - 100 mg/dL Performed by Anton Roca, POC Collection Time: 12/30/20  5:55 AM  
Result Value Ref Range Glucose (POC) 229 (H) 65 - 100 mg/dL Performed by Anton Roca, POC Collection Time: 12/30/20 11:19 AM  
Result Value Ref Range Glucose (POC) 165 (H) 65 - 100 mg/dL Performed by [employer] [place] GLUCOSE, POC Collection Time: 12/30/20 10:04 PM  
Result Value Ref Range Glucose (POC) 158 (H) 65 - 100 mg/dL Performed by Lien Hernandez METABOLIC PANEL, BASIC Collection Time: 12/31/20  5:21 AM  
Result Value Ref Range Sodium 155 (H) 136 - 145 mmol/L Potassium 3.5 3.5 - 5.1 mmol/L Chloride 119 (H) 97 - 108 mmol/L  
 CO2 26 21 - 32 mmol/L Anion gap 10 5 - 15 mmol/L Glucose 187 (H) 65 - 100 mg/dL BUN 34 (H) 6 - 20 mg/dL Creatinine 0.97 0.70 - 1.30 mg/dL BUN/Creatinine ratio 35 (H) 12 - 20 GFR est AA >60 >60 ml/min/1.73m2 GFR est non-AA >60 >60 ml/min/1.73m2  Calcium 8.6 8.5 - 10.1 mg/dL CBC WITH AUTOMATED DIFF Collection Time: 12/31/20  5:21 AM  
Result Value Ref Range WBC 8.4 4.4 - 11.3 K/uL  
 RBC 3.30 (L) 4.50 - 5.90 M/uL HGB 9.7 (L) 13.5 - 17.5 g/dL HCT 30.4 (L) 41 - 53 % MCV 92.0 80 - 100 FL  
 MCH 29.5 (L) 31 - 34 PG  
 MCHC 32.1 31.0 - 36.0 g/dL  
 RDW 16.3 (H) 11.5 - 14.5 % PLATELET 365 K/uL MPV 8.7 6.5 - 11.5 FL  
 NRBC 10.0  WBC ABSOLUTE NRBC 0.01 K/uL NEUTROPHILS 72 42 - 75 % LYMPHOCYTES 19 (L) 20.5 - 51.1 % MONOCYTES 8 1.7 - 9.3 % EOSINOPHILS 0 (L) 0.9 - 2.9 % BASOPHILS 1 0.0 - 2.5 % ABS. NEUTROPHILS 6.1 1.8 - 7.7 K/UL  
 ABS. LYMPHOCYTES 1.6 1.0 - 4.8 K/UL  
 ABS. MONOCYTES 0.6 0.2 - 2.4 K/UL  
 ABS. EOSINOPHILS 0.0 0.0 - 0.7 K/UL  
 ABS. BASOPHILS 0.1 0.0 - 0.2 K/UL GLUCOSE, POC Collection Time: 12/31/20  5:24 AM  
Result Value Ref Range Glucose (POC) 184 (H) 65 - 100 mg/dL Performed by Leonides Salgado, POC Collection Time: 12/31/20 11:37 AM  
Result Value Ref Range Glucose (POC) 218 (H) 65 - 100 mg/dL Performed by Corazon Ochoa Imaging: Xr Chest [facility] Result Date: 12/29/2020 Impression: The cardiomediastinal silhouette is appropriate for age, technique, and lung expansion. 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 The overlying consolidation has cleared. Pulmonary vasculature is not congested. The lungs are essentially clear. No effusion or pneumothorax is seen. Assessment//Plan Problem List: 
Hospital Problems  Never Reviewed Codes Class Noted POA Hypoxic ICD-10-CM: R09.02 
ICD-9-CM: 799.02  12/31/2020 Unknown Sepsis ([facility] [address] ICD-10-CM: A41.9 ICD-9-CM: 038.9, 995.91  12/29/2020 Unknown Respiratory failure, unspecified with hypoxia ([facility] [address] ICD-10-CM: J96.91 
ICD-9-CM: 518.81  12/29/2020 Unknown Hypoxia ICD-10-CM: R09.02 
ICD-9-CM: 799.02  12/1/2020 Unknown PNA (pneumonia) ICD-10-CM: J18.9 ICD-9-CM: 559  12/1/2020 Unknown Acute possible aspiration pneumonia vs Pneumonitis - CXR clear but +cough, +hypoxia, + secretions w sxn but unsure if patient had any vomiting episode and patient improved significantly with deep suctioning and oral care - Status post PEG tube placement recently - Jevity 1.2 at 25ml/hr increase by 10 mls Q6H until @ goal of 55ml/hr hours. Free H20 flushes 120mls Q6H increase to gaol pf 160ml, with residual checks Q6H x 24H. 
- Repeat consult to Nutrition for any updated recommendations - Aspiration precautions, frequent oral care - IV ABX Zosyn + Levaquin - Supplemental O2 prn or BiPap to keep sats >92% 
- Continue Duonebs Q4H prn 
- 12/31: overall secondary to poor oral care and patient inability to handle his own secretion that most likely caused his respiratory event as currently patient is not showing evidence of residuals on tube feedings and patient has quickly resolved his extreme hypoxic respiratory failure on arrival 
  
Fever w Hypoxia - LTC resident - Rapid Covid swab shows as not detected 
-Patient has been afebrile and on room air continues to improve Diabetes - Accuchecks Q6H 
- SSI coverage  
  
CVA 
- Prior hx of CVA 
- Continue Plavix  
  
Hypertension - 's/60-70's 
- Hold ACEi, resume if SBP >140 
- Monitor BP 
  
 
DNR Spent 30 minutes evaluting and coordinating patient care of which >50% was spent coordinating and counseling. ' 
 
Electronically signed by Jose David Carrillo MD on 12/31/2020 at 4:13 PM

## 2020-12-31 NOTE — ROUTINE PROCESS
Bedside shift change report given to Maricruz Vincent (oncoming nurse) by Johanne Cabrera (offgoing nurse). Report included the following information SBAR.

## 2020-12-31 NOTE — ROUTINE PROCESS
Repositioned @ intervals, moaning noted when turning. Head of bed elevated. Jevity 1.2 infusing thru peg tube without problems. Contracted upper and lower extremities. meriplex dressing intact to sacral, open wounds noted to feet, heel protectors in use.

## 2020-12-31 NOTE — ROUTINE PROCESS
Bedside shift change report given to cheyenne minor (oncoming nurse) by Adriana Varela nurse). Report included the following information SBAR.

## 2021-01-01 ENCOUNTER — APPOINTMENT (OUTPATIENT)
Dept: GENERAL RADIOLOGY | Age: 66
DRG: 871 | End: 2021-01-01
Attending: EMERGENCY MEDICINE
Payer: MEDICARE

## 2021-01-01 ENCOUNTER — HOSPITAL ENCOUNTER (INPATIENT)
Age: 66
LOS: 1 days | DRG: 871 | End: 2021-01-29
Attending: EMERGENCY MEDICINE | Admitting: EMERGENCY MEDICINE
Payer: MEDICARE

## 2021-01-01 VITALS
RESPIRATION RATE: 20 BRPM | SYSTOLIC BLOOD PRESSURE: 108 MMHG | OXYGEN SATURATION: 94 % | TEMPERATURE: 97.6 F | HEIGHT: 60 IN | BODY MASS INDEX: 24.74 KG/M2 | WEIGHT: 126 LBS | HEART RATE: 80 BPM | DIASTOLIC BLOOD PRESSURE: 68 MMHG

## 2021-01-01 VITALS
HEIGHT: 68 IN | OXYGEN SATURATION: 89 % | RESPIRATION RATE: 38 BRPM | TEMPERATURE: 100 F | DIASTOLIC BLOOD PRESSURE: 50 MMHG | HEART RATE: 117 BPM | SYSTOLIC BLOOD PRESSURE: 89 MMHG | WEIGHT: 115 LBS | BODY MASS INDEX: 17.43 KG/M2

## 2021-01-01 DIAGNOSIS — J18.9 PNEUMONIA OF LEFT LOWER LOBE DUE TO INFECTIOUS ORGANISM: Primary | ICD-10-CM

## 2021-01-01 PROBLEM — E11.9 DIABETES MELLITUS TYPE 2, CONTROLLED (HCC): Status: ACTIVE | Noted: 2021-01-01

## 2021-01-01 PROBLEM — E86.0 DEHYDRATION: Status: ACTIVE | Noted: 2021-01-01

## 2021-01-01 LAB
ABO + RH BLD: NORMAL
ALBUMIN SERPL-MCNC: 1.9 G/DL (ref 3.5–5)
ALBUMIN/GLOB SERPL: 0.3 {RATIO} (ref 1.1–2.2)
ALP SERPL-CCNC: 112 U/L (ref 45–117)
ALT SERPL-CCNC: 30 U/L (ref 12–78)
ANION GAP SERPL CALC-SCNC: 10 MMOL/L (ref 5–15)
ANION GAP SERPL CALC-SCNC: 10 MMOL/L (ref 5–15)
ANION GAP SERPL CALC-SCNC: 9 MMOL/L (ref 5–15)
APPEARANCE UR: CLEAR
AST SERPL W P-5'-P-CCNC: 29 U/L (ref 15–37)
ATRIAL RATE: 109 BPM
BACTERIA SPEC CULT: NORMAL
BACTERIA SPEC CULT: NORMAL
BACTERIA URNS QL MICRO: ABNORMAL /HPF
BASOPHILS # BLD: 0 K/UL (ref 0–0.2)
BASOPHILS # BLD: 0 K/UL (ref 0–0.2)
BASOPHILS # BLD: 0.1 K/UL (ref 0–0.2)
BASOPHILS NFR BLD: 1 % (ref 0–2.5)
BILIRUB SERPL-MCNC: 0.3 MG/DL (ref 0.2–1)
BILIRUB UR QL: NEGATIVE
BLOOD GROUP ANTIBODIES SERPL: NEGATIVE
BUN SERPL-MCNC: 18 MG/DL (ref 6–20)
BUN SERPL-MCNC: 19 MG/DL (ref 6–20)
BUN SERPL-MCNC: 27 MG/DL (ref 6–20)
BUN SERPL-MCNC: 42 MG/DL (ref 6–20)
BUN SERPL-MCNC: 48 MG/DL (ref 6–20)
BUN/CREAT SERPL: 25 (ref 12–20)
BUN/CREAT SERPL: 28 (ref 12–20)
BUN/CREAT SERPL: 33 (ref 12–20)
BUN/CREAT SERPL: 40 (ref 12–20)
BUN/CREAT SERPL: 53 (ref 12–20)
CA-I BLD-MCNC: 8.2 MG/DL (ref 8.5–10.1)
CA-I BLD-MCNC: 8.3 MG/DL (ref 8.5–10.1)
CA-I BLD-MCNC: 8.4 MG/DL (ref 8.5–10.1)
CA-I BLD-MCNC: 8.6 MG/DL (ref 8.5–10.1)
CA-I BLD-MCNC: 8.8 MG/DL (ref 8.5–10.1)
CALCULATED P AXIS, ECG09: 78 DEGREES
CALCULATED R AXIS, ECG10: 56 DEGREES
CALCULATED T AXIS, ECG11: 71 DEGREES
CHLORIDE SERPL-SCNC: 115 MMOL/L (ref 97–108)
CHLORIDE SERPL-SCNC: 119 MMOL/L (ref 97–108)
CHLORIDE SERPL-SCNC: 120 MMOL/L (ref 97–108)
CHLORIDE SERPL-SCNC: 122 MMOL/L (ref 97–108)
CHLORIDE SERPL-SCNC: 123 MMOL/L (ref 97–108)
CO2 SERPL-SCNC: 24 MMOL/L (ref 21–32)
CO2 SERPL-SCNC: 25 MMOL/L (ref 21–32)
CO2 SERPL-SCNC: 26 MMOL/L (ref 21–32)
CO2 SERPL-SCNC: 27 MMOL/L (ref 21–32)
CO2 SERPL-SCNC: 28 MMOL/L (ref 21–32)
COLOR UR: ABNORMAL
COVID-19 RAPID TEST, COVR: NEGATIVE
COVID-19 RAPID TEST, COVR: NOT DETECTED
CREAT SERPL-MCNC: 0.64 MG/DL (ref 0.7–1.3)
CREAT SERPL-MCNC: 0.75 MG/DL (ref 0.7–1.3)
CREAT SERPL-MCNC: 0.8 MG/DL (ref 0.7–1.3)
CREAT SERPL-MCNC: 0.82 MG/DL (ref 0.7–1.3)
CREAT SERPL-MCNC: 1.2 MG/DL (ref 0.7–1.3)
DIAGNOSIS, 93000: NORMAL
EOSINOPHIL # BLD: 0.1 K/UL (ref 0–0.7)
EOSINOPHIL # BLD: 0.1 K/UL (ref 0–0.7)
EOSINOPHIL # BLD: 0.2 K/UL (ref 0–0.7)
EOSINOPHIL NFR BLD: 1 % (ref 0.9–2.9)
ERYTHROCYTE [DISTWIDTH] IN BLOOD BY AUTOMATED COUNT: 12.8 % (ref 11.5–14.5)
ERYTHROCYTE [DISTWIDTH] IN BLOOD BY AUTOMATED COUNT: 15.8 % (ref 11.5–14.5)
ERYTHROCYTE [DISTWIDTH] IN BLOOD BY AUTOMATED COUNT: 15.8 % (ref 11.5–14.5)
ERYTHROCYTE [DISTWIDTH] IN BLOOD BY AUTOMATED COUNT: 16 % (ref 11.5–14.5)
ERYTHROCYTE [DISTWIDTH] IN BLOOD BY AUTOMATED COUNT: 16.2 % (ref 11.5–14.5)
ERYTHROCYTE [DISTWIDTH] IN BLOOD BY AUTOMATED COUNT: 16.2 % (ref 11.5–14.5)
EST. AVERAGE GLUCOSE BLD GHB EST-MCNC: 123 MG/DL
GLOBULIN SER CALC-MCNC: 7.3 G/DL (ref 2–4)
GLUCOSE BLD STRIP.AUTO-MCNC: 100 MG/DL (ref 65–100)
GLUCOSE BLD STRIP.AUTO-MCNC: 101 MG/DL (ref 65–100)
GLUCOSE BLD STRIP.AUTO-MCNC: 103 MG/DL (ref 65–100)
GLUCOSE BLD STRIP.AUTO-MCNC: 107 MG/DL (ref 65–100)
GLUCOSE BLD STRIP.AUTO-MCNC: 112 MG/DL (ref 65–100)
GLUCOSE BLD STRIP.AUTO-MCNC: 115 MG/DL (ref 65–100)
GLUCOSE BLD STRIP.AUTO-MCNC: 118 MG/DL (ref 65–100)
GLUCOSE BLD STRIP.AUTO-MCNC: 120 MG/DL (ref 65–100)
GLUCOSE BLD STRIP.AUTO-MCNC: 124 MG/DL (ref 65–100)
GLUCOSE BLD STRIP.AUTO-MCNC: 126 MG/DL (ref 65–100)
GLUCOSE BLD STRIP.AUTO-MCNC: 137 MG/DL (ref 65–100)
GLUCOSE BLD STRIP.AUTO-MCNC: 137 MG/DL (ref 65–100)
GLUCOSE BLD STRIP.AUTO-MCNC: 143 MG/DL (ref 65–100)
GLUCOSE BLD STRIP.AUTO-MCNC: 156 MG/DL (ref 65–100)
GLUCOSE BLD STRIP.AUTO-MCNC: 164 MG/DL (ref 65–100)
GLUCOSE BLD STRIP.AUTO-MCNC: 167 MG/DL (ref 65–100)
GLUCOSE BLD STRIP.AUTO-MCNC: 168 MG/DL (ref 65–100)
GLUCOSE BLD STRIP.AUTO-MCNC: 174 MG/DL (ref 65–100)
GLUCOSE BLD STRIP.AUTO-MCNC: 175 MG/DL (ref 65–100)
GLUCOSE BLD STRIP.AUTO-MCNC: 176 MG/DL (ref 65–100)
GLUCOSE BLD STRIP.AUTO-MCNC: 201 MG/DL (ref 65–100)
GLUCOSE BLD STRIP.AUTO-MCNC: 202 MG/DL (ref 65–100)
GLUCOSE BLD STRIP.AUTO-MCNC: 207 MG/DL (ref 65–100)
GLUCOSE BLD STRIP.AUTO-MCNC: 225 MG/DL (ref 65–100)
GLUCOSE BLD STRIP.AUTO-MCNC: 235 MG/DL (ref 65–100)
GLUCOSE BLD STRIP.AUTO-MCNC: 243 MG/DL (ref 65–100)
GLUCOSE BLD STRIP.AUTO-MCNC: 271 MG/DL (ref 65–100)
GLUCOSE BLD STRIP.AUTO-MCNC: 64 MG/DL (ref 65–100)
GLUCOSE SERPL-MCNC: 135 MG/DL (ref 65–100)
GLUCOSE SERPL-MCNC: 137 MG/DL (ref 65–100)
GLUCOSE SERPL-MCNC: 166 MG/DL (ref 65–100)
GLUCOSE SERPL-MCNC: 206 MG/DL (ref 65–100)
GLUCOSE SERPL-MCNC: 220 MG/DL (ref 65–100)
GLUCOSE UR STRIP.AUTO-MCNC: NEGATIVE MG/DL
HBA1C MFR BLD: 5.9 % (ref 4–5.6)
HCT VFR BLD AUTO: 22.3 % (ref 41–53)
HCT VFR BLD AUTO: 26.9 % (ref 41–53)
HCT VFR BLD AUTO: 27.7 % (ref 41–53)
HCT VFR BLD AUTO: 27.9 % (ref 41–53)
HCT VFR BLD AUTO: 28.1 % (ref 41–53)
HCT VFR BLD AUTO: 28.7 % (ref 41–53)
HGB BLD-MCNC: 7.3 G/DL (ref 13–16)
HGB BLD-MCNC: 8.6 G/DL (ref 13.5–17.5)
HGB BLD-MCNC: 8.7 G/DL (ref 13.5–17.5)
HGB BLD-MCNC: 8.9 G/DL (ref 13.5–17.5)
HGB BLD-MCNC: 8.9 G/DL (ref 13.5–17.5)
HGB BLD-MCNC: 9.2 G/DL (ref 13.5–17.5)
HGB UR QL STRIP: NEGATIVE
KETONES UR QL STRIP.AUTO: NEGATIVE MG/DL
LACTATE SERPL-SCNC: 2.5 MMOL/L (ref 0.4–2)
LEUKOCYTE ESTERASE UR QL STRIP.AUTO: NEGATIVE
LYMPHOCYTES # BLD: 1.8 K/UL (ref 1–4.8)
LYMPHOCYTES # BLD: 1.9 K/UL (ref 1–4.8)
LYMPHOCYTES # BLD: 2.5 K/UL (ref 1–4.8)
LYMPHOCYTES NFR BLD: 13 % (ref 20.5–51.1)
LYMPHOCYTES NFR BLD: 26 % (ref 20.5–51.1)
LYMPHOCYTES NFR BLD: 29 % (ref 20.5–51.1)
MAGNESIUM SERPL-MCNC: 2.2 MG/DL (ref 1.6–2.4)
MAGNESIUM SERPL-MCNC: 2.8 MG/DL (ref 1.6–2.4)
MCH RBC QN AUTO: 27.8 PG (ref 31–34)
MCH RBC QN AUTO: 28.6 PG (ref 31–34)
MCH RBC QN AUTO: 28.7 PG (ref 31–34)
MCH RBC QN AUTO: 28.8 PG (ref 31–34)
MCH RBC QN AUTO: 29 PG (ref 31–34)
MCH RBC QN AUTO: 29.1 PG (ref 31–34)
MCHC RBC AUTO-ENTMCNC: 30.7 G/DL (ref 31–36)
MCHC RBC AUTO-ENTMCNC: 31.7 G/DL (ref 31–36)
MCHC RBC AUTO-ENTMCNC: 31.9 G/DL (ref 31–36)
MCHC RBC AUTO-ENTMCNC: 32.1 G/DL (ref 31–36)
MCHC RBC AUTO-ENTMCNC: 32.5 G/DL (ref 31–36)
MCHC RBC AUTO-ENTMCNC: 32.6 G/DL (ref 31–36)
MCV RBC AUTO: 87.6 FL (ref 80–100)
MCV RBC AUTO: 89.5 FL (ref 80–100)
MCV RBC AUTO: 90.3 FL (ref 80–100)
MCV RBC AUTO: 90.4 FL (ref 80–100)
MCV RBC AUTO: 90.6 FL (ref 80–100)
MCV RBC AUTO: 90.6 FL (ref 80–100)
MONOCYTES # BLD: 0.4 K/UL (ref 0.2–2.4)
MONOCYTES # BLD: 0.5 K/UL (ref 0.2–2.4)
MONOCYTES # BLD: 0.9 K/UL (ref 0–0.8)
MONOCYTES NFR BLD: 5 % (ref 3.1–13.9)
MONOCYTES NFR BLD: 6 % (ref 1.7–9.3)
MONOCYTES NFR BLD: 8 % (ref 1.7–9.3)
NEUTS SEG # BLD: 15.9 K/UL (ref 1.8–7.7)
NEUTS SEG # BLD: 3.8 K/UL (ref 1.8–7.7)
NEUTS SEG # BLD: 4.8 K/UL (ref 1.8–7.7)
NEUTS SEG NFR BLD: 61 % (ref 42–75)
NEUTS SEG NFR BLD: 66 % (ref 42–75)
NEUTS SEG NFR BLD: 80 % (ref 42–75)
NITRITE UR QL STRIP.AUTO: NEGATIVE
NRBC # BLD: 0 K/UL
NRBC # BLD: 0.01 K/UL
NRBC # BLD: 0.01 K/UL
NRBC BLD-RTO: 0.1 PER 100 WBC
NRBC BLD-RTO: 10 PER 100 WBC
P-R INTERVAL, ECG05: 126 MS
PERFORMED BY, TECHID: ABNORMAL
PERFORMED BY, TECHID: NORMAL
PH UR STRIP: 6 [PH] (ref 5–8)
PLATELET # BLD AUTO: 252 K/UL
PLATELET # BLD AUTO: 307 K/UL
PLATELET # BLD AUTO: 366 K/UL
PLATELET # BLD AUTO: 408 K/UL
PLATELET # BLD AUTO: 433 K/UL
PLATELET # BLD AUTO: 459 K/UL
PMV BLD AUTO: 8.4 FL (ref 6.5–11.5)
PMV BLD AUTO: 8.5 FL (ref 6.5–11.5)
PMV BLD AUTO: 8.6 FL (ref 6.5–11.5)
PMV BLD AUTO: 8.6 FL (ref 6.5–11.5)
PMV BLD AUTO: 9.7 FL (ref 6.5–11.5)
PMV BLD AUTO: 9.9 FL (ref 6.5–11.5)
POTASSIUM SERPL-SCNC: 3.4 MMOL/L (ref 3.5–5.1)
POTASSIUM SERPL-SCNC: 3.5 MMOL/L (ref 3.5–5.1)
POTASSIUM SERPL-SCNC: 3.7 MMOL/L (ref 3.5–5.1)
POTASSIUM SERPL-SCNC: 3.7 MMOL/L (ref 3.5–5.1)
POTASSIUM SERPL-SCNC: 4.3 MMOL/L (ref 3.5–5.1)
PROT SERPL-MCNC: 9.2 G/DL (ref 6.4–8.2)
PROT UR STRIP-MCNC: ABNORMAL MG/DL
Q-T INTERVAL, ECG07: 356 MS
QRS DURATION, ECG06: 69 MS
QTC CALCULATION (BEZET), ECG08: 477 MS
RBC # BLD AUTO: 2.54 M/UL
RBC # BLD AUTO: 3 M/UL (ref 4.5–5.9)
RBC # BLD AUTO: 3.06 M/UL (ref 4.5–5.9)
RBC # BLD AUTO: 3.09 M/UL (ref 4.5–5.9)
RBC # BLD AUTO: 3.1 M/UL (ref 4.5–5.9)
RBC # BLD AUTO: 3.17 M/UL (ref 4.5–5.9)
RBC #/AREA URNS HPF: ABNORMAL /HPF (ref 0–3)
SARS-COV-2, COV2: NORMAL
SARS-COV-2, COV2NT: NOT DETECTED
SODIUM SERPL-SCNC: 152 MMOL/L (ref 136–145)
SODIUM SERPL-SCNC: 153 MMOL/L (ref 136–145)
SODIUM SERPL-SCNC: 155 MMOL/L (ref 136–145)
SODIUM SERPL-SCNC: 157 MMOL/L (ref 136–145)
SODIUM SERPL-SCNC: 159 MMOL/L (ref 136–145)
SP GR UR REFRACTOMETRY: 1.01 (ref 1–1.03)
SPECIAL REQUESTS,SREQ: NORMAL
SPECIAL REQUESTS,SREQ: NORMAL
SPECIMEN EXP DATE BLD: NORMAL
SPECIMEN SOURCE: NEGATIVE
SPECIMEN SOURCE: NORMAL
UROBILINOGEN UR QL STRIP.AUTO: 1 EU/DL (ref 0.2–1)
VENTRICULAR RATE, ECG03: 108 BPM
WBC # BLD AUTO: 14.4 K/UL (ref 4.4–11.3)
WBC # BLD AUTO: 19.6 K/UL (ref 4.4–11.3)
WBC # BLD AUTO: 5.3 K/UL (ref 4.4–11.3)
WBC # BLD AUTO: 6.2 K/UL (ref 4.4–11.3)
WBC # BLD AUTO: 6.5 K/UL (ref 4.4–11.3)
WBC # BLD AUTO: 7.3 K/UL (ref 4.4–11.3)
WBC URNS QL MICRO: ABNORMAL /HPF (ref 0–5)

## 2021-01-01 PROCEDURE — 83735 ASSAY OF MAGNESIUM: CPT

## 2021-01-01 PROCEDURE — 65270000029 HC RM PRIVATE

## 2021-01-01 PROCEDURE — 85025 COMPLETE CBC W/AUTO DIFF WBC: CPT

## 2021-01-01 PROCEDURE — 74011250637 HC RX REV CODE- 250/637: Performed by: EMERGENCY MEDICINE

## 2021-01-01 PROCEDURE — 81001 URINALYSIS AUTO W/SCOPE: CPT

## 2021-01-01 PROCEDURE — 87040 BLOOD CULTURE FOR BACTERIA: CPT

## 2021-01-01 PROCEDURE — 85027 COMPLETE CBC AUTOMATED: CPT

## 2021-01-01 PROCEDURE — 74011250636 HC RX REV CODE- 250/636: Performed by: HOSPITALIST

## 2021-01-01 PROCEDURE — 74011636637 HC RX REV CODE- 636/637: Performed by: HOSPITALIST

## 2021-01-01 PROCEDURE — 74011000258 HC RX REV CODE- 258: Performed by: INTERNAL MEDICINE

## 2021-01-01 PROCEDURE — 94640 AIRWAY INHALATION TREATMENT: CPT

## 2021-01-01 PROCEDURE — 74011000258 HC RX REV CODE- 258: Performed by: HOSPITALIST

## 2021-01-01 PROCEDURE — 36415 COLL VENOUS BLD VENIPUNCTURE: CPT

## 2021-01-01 PROCEDURE — 74011000250 HC RX REV CODE- 250: Performed by: NURSE PRACTITIONER

## 2021-01-01 PROCEDURE — 74011250636 HC RX REV CODE- 250/636: Performed by: NURSE PRACTITIONER

## 2021-01-01 PROCEDURE — 74011000250 HC RX REV CODE- 250: Performed by: HOSPITALIST

## 2021-01-01 PROCEDURE — 71045 X-RAY EXAM CHEST 1 VIEW: CPT

## 2021-01-01 PROCEDURE — 82962 GLUCOSE BLOOD TEST: CPT

## 2021-01-01 PROCEDURE — 80048 BASIC METABOLIC PNL TOTAL CA: CPT

## 2021-01-01 PROCEDURE — 74011636637 HC RX REV CODE- 636/637: Performed by: EMERGENCY MEDICINE

## 2021-01-01 PROCEDURE — U0003 INFECTIOUS AGENT DETECTION BY NUCLEIC ACID (DNA OR RNA); SEVERE ACUTE RESPIRATORY SYNDROME CORONAVIRUS 2 (SARS-COV-2) (CORONAVIRUS DISEASE [COVID-19]), AMPLIFIED PROBE TECHNIQUE, MAKING USE OF HIGH THROUGHPUT TECHNOLOGIES AS DESCRIBED BY CMS-2020-01-R: HCPCS

## 2021-01-01 PROCEDURE — 99285 EMERGENCY DEPT VISIT HI MDM: CPT

## 2021-01-01 PROCEDURE — 74011250636 HC RX REV CODE- 250/636: Performed by: EMERGENCY MEDICINE

## 2021-01-01 PROCEDURE — 74011000250 HC RX REV CODE- 250: Performed by: EMERGENCY MEDICINE

## 2021-01-01 PROCEDURE — 80053 COMPREHEN METABOLIC PANEL: CPT

## 2021-01-01 PROCEDURE — 74011250637 HC RX REV CODE- 250/637: Performed by: HOSPITALIST

## 2021-01-01 PROCEDURE — 74011250636 HC RX REV CODE- 250/636: Performed by: FAMILY MEDICINE

## 2021-01-01 PROCEDURE — 87205 SMEAR GRAM STAIN: CPT

## 2021-01-01 PROCEDURE — 77010033678 HC OXYGEN DAILY

## 2021-01-01 PROCEDURE — 87635 SARS-COV-2 COVID-19 AMP PRB: CPT

## 2021-01-01 PROCEDURE — 94762 N-INVAS EAR/PLS OXIMTRY CONT: CPT

## 2021-01-01 PROCEDURE — 74011000258 HC RX REV CODE- 258: Performed by: EMERGENCY MEDICINE

## 2021-01-01 PROCEDURE — 94760 N-INVAS EAR/PLS OXIMETRY 1: CPT

## 2021-01-01 PROCEDURE — 86901 BLOOD TYPING SEROLOGIC RH(D): CPT

## 2021-01-01 PROCEDURE — 93005 ELECTROCARDIOGRAM TRACING: CPT

## 2021-01-01 PROCEDURE — 83605 ASSAY OF LACTIC ACID: CPT

## 2021-01-01 PROCEDURE — 83036 HEMOGLOBIN GLYCOSYLATED A1C: CPT

## 2021-01-01 PROCEDURE — 74011250637 HC RX REV CODE- 250/637: Performed by: NURSE PRACTITIONER

## 2021-01-01 RX ORDER — POLYETHYLENE GLYCOL 3350 17 G/17G
17 POWDER, FOR SOLUTION ORAL
Status: DISCONTINUED | OUTPATIENT
Start: 2021-01-01 | End: 2021-01-01 | Stop reason: HOSPADM

## 2021-01-01 RX ORDER — IPRATROPIUM BROMIDE AND ALBUTEROL SULFATE 2.5; .5 MG/3ML; MG/3ML
3 SOLUTION RESPIRATORY (INHALATION)
Status: DISCONTINUED | OUTPATIENT
Start: 2021-01-01 | End: 2021-01-01 | Stop reason: HOSPADM

## 2021-01-01 RX ORDER — DEXTROSE MONOHYDRATE AND SODIUM CHLORIDE 5; .45 G/100ML; G/100ML
100 INJECTION, SOLUTION INTRAVENOUS CONTINUOUS
Status: DISCONTINUED | OUTPATIENT
Start: 2021-01-01 | End: 2021-01-01

## 2021-01-01 RX ORDER — SODIUM CHLORIDE 450 MG/100ML
75 INJECTION, SOLUTION INTRAVENOUS CONTINUOUS
Status: DISCONTINUED | OUTPATIENT
Start: 2021-01-01 | End: 2021-01-01

## 2021-01-01 RX ORDER — ACETAMINOPHEN 650 MG/1
650 SUPPOSITORY RECTAL
Status: DISCONTINUED | OUTPATIENT
Start: 2021-01-01 | End: 2021-01-01 | Stop reason: HOSPADM

## 2021-01-01 RX ORDER — AMOXICILLIN AND CLAVULANATE POTASSIUM 875; 125 MG/1; MG/1
1 TABLET, FILM COATED ORAL EVERY 12 HOURS
Status: DISCONTINUED | OUTPATIENT
Start: 2021-01-01 | End: 2021-01-01 | Stop reason: HOSPADM

## 2021-01-01 RX ORDER — POLYETHYLENE GLYCOL 3350 17 G/17G
17 POWDER, FOR SOLUTION ORAL
Qty: 30 PACKET | Refills: 0 | Status: SHIPPED | OUTPATIENT
Start: 2021-01-01

## 2021-01-01 RX ORDER — MAGNESIUM SULFATE 100 %
4 CRYSTALS MISCELLANEOUS AS NEEDED
Status: DISCONTINUED | OUTPATIENT
Start: 2021-01-01 | End: 2021-01-01 | Stop reason: HOSPADM

## 2021-01-01 RX ORDER — DEXTROSE MONOHYDRATE 50 MG/ML
75 INJECTION, SOLUTION INTRAVENOUS CONTINUOUS
Status: DISCONTINUED | OUTPATIENT
Start: 2021-01-01 | End: 2021-01-01 | Stop reason: HOSPADM

## 2021-01-01 RX ORDER — SODIUM CHLORIDE 450 MG/100ML
INJECTION, SOLUTION INTRAVENOUS
Status: DISPENSED
Start: 2021-01-01 | End: 2021-01-01

## 2021-01-01 RX ORDER — DEXTROSE 50 % IN WATER (D50W) INTRAVENOUS SYRINGE
25-50 AS NEEDED
Status: DISCONTINUED | OUTPATIENT
Start: 2021-01-01 | End: 2021-01-01 | Stop reason: HOSPADM

## 2021-01-01 RX ORDER — AMOXICILLIN AND CLAVULANATE POTASSIUM 875; 125 MG/1; MG/1
1 TABLET, FILM COATED ORAL EVERY 12 HOURS
Qty: 10 TAB | Refills: 0 | Status: SHIPPED | OUTPATIENT
Start: 2021-01-01 | End: 2021-01-01

## 2021-01-01 RX ORDER — SODIUM CHLORIDE 450 MG/100ML
100 INJECTION, SOLUTION INTRAVENOUS CONTINUOUS
Status: DISCONTINUED | OUTPATIENT
Start: 2021-01-01 | End: 2021-01-01

## 2021-01-01 RX ORDER — ENOXAPARIN SODIUM 100 MG/ML
40 INJECTION SUBCUTANEOUS EVERY 24 HOURS
Status: CANCELLED | OUTPATIENT
Start: 2021-01-01

## 2021-01-01 RX ORDER — ENOXAPARIN SODIUM 100 MG/ML
40 INJECTION SUBCUTANEOUS EVERY 24 HOURS
Status: DISCONTINUED | OUTPATIENT
Start: 2021-01-01 | End: 2021-01-01

## 2021-01-01 RX ORDER — IPRATROPIUM BROMIDE AND ALBUTEROL SULFATE 2.5; .5 MG/3ML; MG/3ML
3 SOLUTION RESPIRATORY (INHALATION) 4 TIMES DAILY
Status: DISCONTINUED | OUTPATIENT
Start: 2021-01-01 | End: 2021-01-01

## 2021-01-01 RX ORDER — INSULIN LISPRO 100 [IU]/ML
INJECTION, SOLUTION INTRAVENOUS; SUBCUTANEOUS EVERY 6 HOURS
Status: DISCONTINUED | OUTPATIENT
Start: 2021-01-01 | End: 2021-01-01

## 2021-01-01 RX ORDER — CLOPIDOGREL BISULFATE 75 MG/1
75 TABLET ORAL DAILY
Status: DISCONTINUED | OUTPATIENT
Start: 2021-01-01 | End: 2021-01-01

## 2021-01-01 RX ORDER — SODIUM CHLORIDE 0.9 % (FLUSH) 0.9 %
5-40 SYRINGE (ML) INJECTION AS NEEDED
Status: DISCONTINUED | OUTPATIENT
Start: 2021-01-01 | End: 2021-01-01 | Stop reason: HOSPADM

## 2021-01-01 RX ORDER — SODIUM CHLORIDE 0.9 % (FLUSH) 0.9 %
5-40 SYRINGE (ML) INJECTION EVERY 8 HOURS
Status: DISCONTINUED | OUTPATIENT
Start: 2021-01-01 | End: 2021-01-01

## 2021-01-01 RX ORDER — INSULIN LISPRO 100 [IU]/ML
INJECTION, SOLUTION INTRAVENOUS; SUBCUTANEOUS
Qty: 1 PACKAGE | Refills: 1 | Status: SHIPPED | OUTPATIENT
Start: 2021-01-01

## 2021-01-01 RX ORDER — MORPHINE SULFATE 2 MG/ML
2 INJECTION, SOLUTION INTRAMUSCULAR; INTRAVENOUS
Status: DISCONTINUED | OUTPATIENT
Start: 2021-01-01 | End: 2021-01-01 | Stop reason: HOSPADM

## 2021-01-01 RX ADMIN — INSULIN LISPRO 2 UNITS: 100 INJECTION, SOLUTION INTRAVENOUS; SUBCUTANEOUS at 12:56

## 2021-01-01 RX ADMIN — Medication 10 ML: at 02:23

## 2021-01-01 RX ADMIN — DEXTROSE MONOHYDRATE 75 ML/HR: 50 INJECTION, SOLUTION INTRAVENOUS at 15:21

## 2021-01-01 RX ADMIN — Medication 10 ML: at 13:54

## 2021-01-01 RX ADMIN — INSULIN LISPRO 3 UNITS: 100 INJECTION, SOLUTION INTRAVENOUS; SUBCUTANEOUS at 02:37

## 2021-01-01 RX ADMIN — PIPERACILLIN AND TAZOBACTAM 3.38 G: 3; .375 INJECTION, POWDER, LYOPHILIZED, FOR SOLUTION INTRAVENOUS at 12:16

## 2021-01-01 RX ADMIN — INSULIN LISPRO 2 UNITS: 100 INJECTION, SOLUTION INTRAVENOUS; SUBCUTANEOUS at 17:57

## 2021-01-01 RX ADMIN — PIPERACILLIN AND TAZOBACTAM 3.38 G: 3; .375 INJECTION, POWDER, LYOPHILIZED, FOR SOLUTION INTRAVENOUS at 02:59

## 2021-01-01 RX ADMIN — IPRATROPIUM BROMIDE AND ALBUTEROL SULFATE 3 ML: .5; 3 SOLUTION RESPIRATORY (INHALATION) at 15:18

## 2021-01-01 RX ADMIN — ENOXAPARIN SODIUM 40 MG: 40 INJECTION SUBCUTANEOUS at 07:37

## 2021-01-01 RX ADMIN — PIPERACILLIN AND TAZOBACTAM 3.38 G: 3; .375 INJECTION, POWDER, LYOPHILIZED, FOR SOLUTION INTRAVENOUS at 20:01

## 2021-01-01 RX ADMIN — PIPERACILLIN AND TAZOBACTAM 3.38 G: 3; .375 INJECTION, POWDER, LYOPHILIZED, FOR SOLUTION INTRAVENOUS at 02:43

## 2021-01-01 RX ADMIN — ENOXAPARIN SODIUM 40 MG: 40 INJECTION SUBCUTANEOUS at 06:26

## 2021-01-01 RX ADMIN — SODIUM CHLORIDE 75 ML/HR: 4.5 INJECTION, SOLUTION INTRAVENOUS at 09:47

## 2021-01-01 RX ADMIN — SODIUM CHLORIDE 1000 ML: 9 INJECTION, SOLUTION INTRAVENOUS at 08:12

## 2021-01-01 RX ADMIN — Medication 10 ML: at 16:19

## 2021-01-01 RX ADMIN — IPRATROPIUM BROMIDE AND ALBUTEROL SULFATE 3 ML: .5; 3 SOLUTION RESPIRATORY (INHALATION) at 07:44

## 2021-01-01 RX ADMIN — ENOXAPARIN SODIUM 40 MG: 40 INJECTION SUBCUTANEOUS at 09:07

## 2021-01-01 RX ADMIN — SODIUM CHLORIDE 100 ML/HR: 4.5 INJECTION, SOLUTION INTRAVENOUS at 16:22

## 2021-01-01 RX ADMIN — LEVOFLOXACIN 750 MG: 5 INJECTION, SOLUTION INTRAVENOUS at 00:11

## 2021-01-01 RX ADMIN — POTASSIUM BICARBONATE 20 MEQ: 782 TABLET, EFFERVESCENT ORAL at 08:17

## 2021-01-01 RX ADMIN — INSULIN LISPRO 2 UNITS: 100 INJECTION, SOLUTION INTRAVENOUS; SUBCUTANEOUS at 06:26

## 2021-01-01 RX ADMIN — ENOXAPARIN SODIUM 40 MG: 40 INJECTION SUBCUTANEOUS at 08:05

## 2021-01-01 RX ADMIN — VANCOMYCIN HYDROCHLORIDE 1000 MG: 1 INJECTION, POWDER, FOR SOLUTION INTRAVENOUS at 09:49

## 2021-01-01 RX ADMIN — IPRATROPIUM BROMIDE AND ALBUTEROL SULFATE 3 ML: .5; 3 SOLUTION RESPIRATORY (INHALATION) at 07:17

## 2021-01-01 RX ADMIN — IPRATROPIUM BROMIDE AND ALBUTEROL SULFATE 3 ML: .5; 3 SOLUTION RESPIRATORY (INHALATION) at 11:25

## 2021-01-01 RX ADMIN — AMOXICILLIN AND CLAVULANATE POTASSIUM 1 TABLET: 875; 125 TABLET, FILM COATED ORAL at 01:06

## 2021-01-01 RX ADMIN — ENOXAPARIN SODIUM 40 MG: 40 INJECTION SUBCUTANEOUS at 07:55

## 2021-01-01 RX ADMIN — LEVOFLOXACIN 750 MG: 5 INJECTION, SOLUTION INTRAVENOUS at 00:24

## 2021-01-01 RX ADMIN — LEVOFLOXACIN 750 MG: 5 INJECTION, SOLUTION INTRAVENOUS at 02:00

## 2021-01-01 RX ADMIN — IPRATROPIUM BROMIDE AND ALBUTEROL SULFATE 3 ML: .5; 3 SOLUTION RESPIRATORY (INHALATION) at 19:27

## 2021-01-01 RX ADMIN — DEXTROSE MONOHYDRATE 75 ML/HR: 50 INJECTION, SOLUTION INTRAVENOUS at 06:19

## 2021-01-01 RX ADMIN — Medication 10 ML: at 05:57

## 2021-01-01 RX ADMIN — IPRATROPIUM BROMIDE AND ALBUTEROL SULFATE 3 ML: .5; 3 SOLUTION RESPIRATORY (INHALATION) at 07:46

## 2021-01-01 RX ADMIN — Medication 10 ML: at 21:08

## 2021-01-01 RX ADMIN — INSULIN LISPRO 2 UNITS: 100 INJECTION, SOLUTION INTRAVENOUS; SUBCUTANEOUS at 12:23

## 2021-01-01 RX ADMIN — AMOXICILLIN AND CLAVULANATE POTASSIUM 1 TABLET: 875; 125 TABLET, FILM COATED ORAL at 08:05

## 2021-01-01 RX ADMIN — VANCOMYCIN HYDROCHLORIDE 1000 MG: 1 INJECTION, POWDER, FOR SOLUTION INTRAVENOUS at 12:06

## 2021-01-01 RX ADMIN — IPRATROPIUM BROMIDE AND ALBUTEROL SULFATE 3 ML: .5; 3 SOLUTION RESPIRATORY (INHALATION) at 11:20

## 2021-01-01 RX ADMIN — IPRATROPIUM BROMIDE AND ALBUTEROL SULFATE 3 ML: .5; 3 SOLUTION RESPIRATORY (INHALATION) at 07:04

## 2021-01-01 RX ADMIN — CLOPIDOGREL BISULFATE 75 MG: 75 TABLET ORAL at 12:06

## 2021-01-01 RX ADMIN — IPRATROPIUM BROMIDE AND ALBUTEROL SULFATE 3 ML: .5; 3 SOLUTION RESPIRATORY (INHALATION) at 16:25

## 2021-01-01 RX ADMIN — SODIUM CHLORIDE 100 ML/HR: 4.5 INJECTION, SOLUTION INTRAVENOUS at 09:53

## 2021-01-01 RX ADMIN — DEXTROSE AND SODIUM CHLORIDE 100 ML/HR: 5; 450 INJECTION, SOLUTION INTRAVENOUS at 04:30

## 2021-01-01 RX ADMIN — INSULIN LISPRO 3 UNITS: 100 INJECTION, SOLUTION INTRAVENOUS; SUBCUTANEOUS at 07:54

## 2021-01-01 RX ADMIN — INSULIN LISPRO 2 UNITS: 100 INJECTION, SOLUTION INTRAVENOUS; SUBCUTANEOUS at 12:00

## 2021-01-01 RX ADMIN — DEXTROSE AND SODIUM CHLORIDE 100 ML/HR: 5; 450 INJECTION, SOLUTION INTRAVENOUS at 13:32

## 2021-01-01 RX ADMIN — IPRATROPIUM BROMIDE AND ALBUTEROL SULFATE 3 ML: .5; 3 SOLUTION RESPIRATORY (INHALATION) at 09:04

## 2021-01-01 RX ADMIN — CLOPIDOGREL BISULFATE 75 MG: 75 TABLET ORAL at 09:07

## 2021-01-01 RX ADMIN — PIPERACILLIN AND TAZOBACTAM 3.38 G: 3; .375 INJECTION, POWDER, LYOPHILIZED, FOR SOLUTION INTRAVENOUS at 21:07

## 2021-01-01 RX ADMIN — PIPERACILLIN AND TAZOBACTAM 3.38 G: 3; .375 INJECTION, POWDER, LYOPHILIZED, FOR SOLUTION INTRAVENOUS at 21:43

## 2021-01-01 RX ADMIN — CLOPIDOGREL BISULFATE 75 MG: 75 TABLET ORAL at 08:40

## 2021-01-01 RX ADMIN — CLOPIDOGREL BISULFATE 75 MG: 75 TABLET ORAL at 09:40

## 2021-01-01 RX ADMIN — INSULIN LISPRO 2 UNITS: 100 INJECTION, SOLUTION INTRAVENOUS; SUBCUTANEOUS at 13:54

## 2021-01-01 RX ADMIN — CLOPIDOGREL BISULFATE 75 MG: 75 TABLET ORAL at 07:54

## 2021-01-01 RX ADMIN — IPRATROPIUM BROMIDE AND ALBUTEROL SULFATE 3 ML: .5; 3 SOLUTION RESPIRATORY (INHALATION) at 11:13

## 2021-01-01 RX ADMIN — IPRATROPIUM BROMIDE AND ALBUTEROL SULFATE 3 ML: .5; 3 SOLUTION RESPIRATORY (INHALATION) at 15:44

## 2021-01-01 RX ADMIN — IPRATROPIUM BROMIDE AND ALBUTEROL SULFATE 3 ML: .5; 3 SOLUTION RESPIRATORY (INHALATION) at 07:12

## 2021-01-01 RX ADMIN — SODIUM CHLORIDE 1000 ML: 9 INJECTION, SOLUTION INTRAVENOUS at 01:54

## 2021-01-01 RX ADMIN — PIPERACILLIN AND TAZOBACTAM 3.38 G: 3; .375 INJECTION, POWDER, FOR SOLUTION INTRAVENOUS at 02:23

## 2021-01-01 RX ADMIN — IPRATROPIUM BROMIDE AND ALBUTEROL SULFATE 3 ML: .5; 3 SOLUTION RESPIRATORY (INHALATION) at 20:35

## 2021-01-01 RX ADMIN — Medication 10 ML: at 05:35

## 2021-01-01 RX ADMIN — IPRATROPIUM BROMIDE AND ALBUTEROL SULFATE 3 ML: .5; 3 SOLUTION RESPIRATORY (INHALATION) at 20:01

## 2021-01-01 RX ADMIN — ACETAMINOPHEN 650 MG: 325 TABLET ORAL at 07:55

## 2021-01-01 RX ADMIN — CLOPIDOGREL BISULFATE 75 MG: 75 TABLET ORAL at 08:08

## 2021-01-01 RX ADMIN — CLOPIDOGREL BISULFATE 75 MG: 75 TABLET ORAL at 08:05

## 2021-01-01 RX ADMIN — IPRATROPIUM BROMIDE AND ALBUTEROL SULFATE 3 ML: .5; 3 SOLUTION RESPIRATORY (INHALATION) at 15:10

## 2021-01-01 RX ADMIN — PIPERACILLIN AND TAZOBACTAM 3.38 G: 3; .375 INJECTION, POWDER, LYOPHILIZED, FOR SOLUTION INTRAVENOUS at 04:30

## 2021-01-01 RX ADMIN — PIPERACILLIN AND TAZOBACTAM 3.38 G: 3; .375 INJECTION, POWDER, LYOPHILIZED, FOR SOLUTION INTRAVENOUS at 03:48

## 2021-01-01 RX ADMIN — ACETAMINOPHEN 650 MG: 650 SUPPOSITORY RECTAL at 02:37

## 2021-01-01 RX ADMIN — ENOXAPARIN SODIUM 40 MG: 40 INJECTION SUBCUTANEOUS at 08:40

## 2021-01-01 RX ADMIN — Medication 10 ML: at 13:15

## 2021-01-01 RX ADMIN — IPRATROPIUM BROMIDE AND ALBUTEROL SULFATE 3 ML: .5; 3 SOLUTION RESPIRATORY (INHALATION) at 11:31

## 2021-01-01 RX ADMIN — DEXTROSE AND SODIUM CHLORIDE 100 ML/HR: 5; 450 INJECTION, SOLUTION INTRAVENOUS at 13:54

## 2021-01-01 RX ADMIN — INSULIN LISPRO 3 UNITS: 100 INJECTION, SOLUTION INTRAVENOUS; SUBCUTANEOUS at 17:28

## 2021-01-01 RX ADMIN — IPRATROPIUM BROMIDE AND ALBUTEROL SULFATE 3 ML: .5; 3 SOLUTION RESPIRATORY (INHALATION) at 22:38

## 2021-01-01 RX ADMIN — PIPERACILLIN AND TAZOBACTAM 3.38 G: 3; .375 INJECTION, POWDER, LYOPHILIZED, FOR SOLUTION INTRAVENOUS at 11:55

## 2021-01-01 RX ADMIN — PIPERACILLIN AND TAZOBACTAM 3.38 G: 3; .375 INJECTION, POWDER, LYOPHILIZED, FOR SOLUTION INTRAVENOUS at 12:47

## 2021-01-01 RX ADMIN — IPRATROPIUM BROMIDE AND ALBUTEROL SULFATE 3 ML: .5; 3 SOLUTION RESPIRATORY (INHALATION) at 19:07

## 2021-01-01 RX ADMIN — INSULIN LISPRO 2 UNITS: 100 INJECTION, SOLUTION INTRAVENOUS; SUBCUTANEOUS at 12:13

## 2021-01-01 RX ADMIN — POTASSIUM BICARBONATE 20 MEQ: 782 TABLET, EFFERVESCENT ORAL at 12:06

## 2021-01-01 RX ADMIN — PIPERACILLIN AND TAZOBACTAM 3.38 G: 3; .375 INJECTION, POWDER, FOR SOLUTION INTRAVENOUS at 07:37

## 2021-01-01 RX ADMIN — SODIUM CHLORIDE 100 ML/HR: 4.5 INJECTION, SOLUTION INTRAVENOUS at 20:01

## 2021-01-01 RX ADMIN — Medication 10 ML: at 21:43

## 2021-01-01 RX ADMIN — PIPERACILLIN AND TAZOBACTAM 3.38 G: 3; .375 INJECTION, POWDER, LYOPHILIZED, FOR SOLUTION INTRAVENOUS at 20:50

## 2021-01-01 RX ADMIN — SODIUM CHLORIDE 100 ML/HR: 4.5 INJECTION, SOLUTION INTRAVENOUS at 16:23

## 2021-01-01 RX ADMIN — PIPERACILLIN AND TAZOBACTAM 3.38 G: 3; .375 INJECTION, POWDER, FOR SOLUTION INTRAVENOUS at 15:00

## 2021-01-01 RX ADMIN — PIPERACILLIN AND TAZOBACTAM 3.38 G: 3; .375 INJECTION, POWDER, FOR SOLUTION INTRAVENOUS at 06:21

## 2021-01-01 RX ADMIN — LEVOFLOXACIN 750 MG: 5 INJECTION, SOLUTION INTRAVENOUS at 23:25

## 2021-01-01 RX ADMIN — Medication 10 ML: at 15:42

## 2021-01-01 RX ADMIN — PIPERACILLIN AND TAZOBACTAM 3.38 G: 3; .375 INJECTION, POWDER, LYOPHILIZED, FOR SOLUTION INTRAVENOUS at 13:54

## 2021-01-01 RX ADMIN — ENOXAPARIN SODIUM 40 MG: 40 INJECTION SUBCUTANEOUS at 09:40

## 2021-01-01 RX ADMIN — Medication 10 ML: at 12:49

## 2021-01-01 NOTE — ASSESSMENT & PLAN NOTE
-CXR clear but +cough, +hypoxia, + secretions w sxn but unsure if patient had any vomiting episode and patient improved significantly with deep suctioning and oral care 
- Status post PEG tube placement recently - Jevity 1.2 at 25ml/hr increase by 10 mls Q6H until @ goal of 55ml/hr hours. Free H20 flushes 120mls Q6H increase to gaol pf 160ml, with residual checks Q6H x 24H. 
- Repeat consult to Nutrition for any updated recommendations  
- Aspiration precautions, frequent oral care  
- IV ABX Zosyn + Levaquin - Supplemental O2 prn or BiPap to keep sats >92% 
- Continue Duonebs Q4H prn 
- 12/31: overall secondary to poor oral care and patient inability to handle his own secretion that most likely caused his respiratory event as currently patient is not showing evidence of residuals on tube feedings and patient has quickly resolved his extreme hypoxic respiratory failure on arrival 
-1/1/2021 patient on room air with sats maintaining mostly around 95% above. Patient is on Zosyn and Levaquin with duo nebs which will be continued. His breath sounds are coarse upper airway noises continue current treatment for pneumonia possible aspiration 
-1/2/2021 good sats on room air continue with antibiotics and duo nebs. -1/3/2021 no change with sats of greater than 93% on room air continue current treatment

## 2021-01-01 NOTE — ROUTINE PROCESS
Non-verbal, moans when repositioned. MA intact with AB. Heel protectors on. PEG intact with TF intact jevity 1.2 goal has been met.

## 2021-01-01 NOTE — ASSESSMENT & PLAN NOTE
-1/1/2021 patient with increasing sodium and potassium will start D5 half-normal saline and monitor electrolytes and kidney functions. 
-1/2/2021 sodium, chloride remains elevated but BUN and creatinine normal we will continue with hydration continue monitoring electrolytes and kidney function 
-1/3/2021 sodium, chloride and been improving on hydration and with the tube feeds continue with current treatment continue monitoring electrolytes

## 2021-01-01 NOTE — ASSESSMENT & PLAN NOTE
-1/1/2021 blood sugar is borderline high and sliding scale insulin will continue same 
-1/2/2021 will change fluids to half-normal saline his blood sugars remain elevated. 
-1/3/2021 blood sugars improved with half-normal saline fluids which will be continued.

## 2021-01-01 NOTE — PROGRESS NOTES
Report received from the offgoing nurse. The pt is resting in bed. He will try to open his eyes when his name is called. He cont to have a congested cough at times. He is tolerating his feeding of jevity.

## 2021-01-01 NOTE — PROGRESS NOTES
Progress Note Date:1/1/2021       Room:205/01 Patient Krystal Esparza. YOB: 1955     Age:65 y.o. Subjective As per Admitting provider noted Flash Banda Jr. is a 60 y. o. male bed bound, minimally verbal resident of [facility] facility with PMH of hypertension, diabetes, glaucoma w blindness, and prior hx CVA who presents to ED for evaluation of low O2 sats and cough. The patient was unable to provide HPI 2/2 to above mentioned factors, and so pertinent history obtained from prior hospital records. In ED, pt was febrile, hypoxic with initial O2 79%, improved after being placed on BiPap per RT. sat leukocytosis and left shift. He was suctioned and oral care given for copious secretions. He has an indwelling PEG. Although CXR was initially negative for new infiltrate, and showed only resolved infiltrate from prior admission for PNA on 12/1/2020. Patient was admitted to the hospital for treatment. Patient seen lying in bed this morning with eyes closed morning to touch and movement. Review of Systems Unable to perform ROS: Dementia Objective Vitals Last 24 Hours: 
Patient Vitals for the past 24 hrs: 
 Temp Pulse Resp BP SpO2  
01/01/21 1144 98.1 °F (36.7 °C) 94 18 113/72 99 % 01/01/21 1131     98 % 01/01/21 0746     95 % 01/01/21 0737 99.4 °F (37.4 °C) 93 18 100/64 (!) 84 % 01/01/21 0527 (!) 96.5 °F (35.8 °C) 96 20 100/65 97 % 01/01/21 0429 98.8 °F (37.1 °C) 98 20 108/70   
01/01/21 0101 98.8 °F (37.1 °C) 98 20 108/70 96 % 12/31/20 2012 96.8 °F (36 °C) (!) 107 22 112/71 95 % 12/31/20 2002     96 % 12/31/20 1657 97.7 °F (36.5 °C) (!) 104 18 112/75 94 % 12/31/20 1529     98 % I/O (24Hr): Intake/Output Summary (Last 24 hours) at 1/1/2021 1324 Last data filed at 12/31/2020 1511 Gross per 24 hour Intake 180 ml Output  Net 180 ml Physical Exam: 
General Appearance:  Comfortable, well-appearing.  No acute distress. Respiratory: Normal respiratory rate. Breath sounds coarse Heart: S1 normal and S2 normal.  No tachycardia Abdomen: Soft and flat. Bowel sounds are normal. No rebound or guarding. No tenderness to palpation. Extremities: Normal range of motion. No acute deformities. Skin:  Warm and dry. Medications Current Facility-Administered Medications Medication Dose Route Frequency  dextrose 5 % - 0.45% NaCl infusion  100 mL/hr IntraVENous CONTINUOUS  piperacillin-tazobactam (ZOSYN) 3.375 g in 0.9% sodium chloride (MBP/ADV) 100 mL MBP  3.375 g IntraVENous Q8H  
 levoFLOXacin (LEVAQUIN) 750 mg in D5W IVPB  750 mg IntraVENous Q24H  
 albuterol-ipratropium (DUO-NEB) 2.5 MG-0.5 MG/3 ML  3 mL Nebulization QID RT  
 glucose chewable tablet 16 g  4 Tab Oral PRN  
 dextrose (D50W) injection syrg 12.5-25 g  25-50 mL IntraVENous PRN  
 glucagon (GLUCAGEN) injection 1 mg  1 mg IntraMUSCular PRN  
 insulin lispro (HUMALOG) injection   SubCUTAneous Q6H  
 clopidogreL (PLAVIX) tablet 75 mg  75 mg PEG Tube DAILY  sodium chloride (NS) flush 5-40 mL  5-40 mL IntraVENous Q8H  
 sodium chloride (NS) flush 5-40 mL  5-40 mL IntraVENous PRN  
 acetaminophen (TYLENOL) tablet 650 mg  650 mg Oral Q6H PRN Or  
 acetaminophen (TYLENOL) suppository 650 mg  650 mg Rectal Q6H PRN  polyethylene glycol (MIRALAX) packet 17 g  17 g Oral DAILY PRN  promethazine (PHENERGAN) tablet 12.5 mg  12.5 mg Oral Q6H PRN Or  
 ondansetron (ZOFRAN) injection 4 mg  4 mg IntraVENous Q6H PRN  
 enoxaparin (LOVENOX) injection 40 mg  40 mg SubCUTAneous DAILY Allergies Patient has no known allergies. Labs/Imaging/Diagnostics Labs: 
Recent Results (from the past 24 hour(s)) GLUCOSE, POC Collection Time: 12/31/20  4:55 PM  
Result Value Ref Range Glucose (POC) 160 (H) 65 - 100 mg/dL Performed by James Rea POC  Collection Time: 12/31/20 11:57 PM Result Value Ref Range Glucose (POC) 126 (H) 65 - 100 mg/dL Performed by Sarai Alejo POC Collection Time: 01/01/21  5:04 AM  
Result Value Ref Range Glucose (POC) 174 (H) 65 - 100 mg/dL Performed by Rosalie Conteh   
CBC WITH AUTOMATED DIFF Collection Time: 01/01/21  6:06 AM  
Result Value Ref Range WBC 6.2 4.4 - 11.3 K/uL  
 RBC 3.17 (L) 4.50 - 5.90 M/uL HGB 9.2 (L) 13.5 - 17.5 g/dL HCT 28.7 (L) 41 - 53 % MCV 90.3 80 - 100 FL  
 MCH 28.8 (L) 31 - 34 PG  
 MCHC 31.9 31.0 - 36.0 g/dL  
 RDW 16.0 (H) 11.5 - 14.5 % PLATELET 278 K/uL MPV 8.6 6.5 - 11.5 FL  
 NRBC 10.0  WBC ABSOLUTE NRBC 0.00 K/uL NEUTROPHILS 61 42 - 75 % LYMPHOCYTES 29 20.5 - 51.1 % MONOCYTES 8 1.7 - 9.3 % EOSINOPHILS 1 0.9 - 2.9 % BASOPHILS 1 0.0 - 2.5 % ABS. NEUTROPHILS 3.8 1.8 - 7.7 K/UL  
 ABS. LYMPHOCYTES 1.8 1.0 - 4.8 K/UL  
 ABS. MONOCYTES 0.5 0.2 - 2.4 K/UL  
 ABS. EOSINOPHILS 0.1 0.0 - 0.7 K/UL  
 ABS. BASOPHILS 0.0 0.0 - 0.2 K/UL METABOLIC PANEL, BASIC Collection Time: 01/01/21  6:06 AM  
Result Value Ref Range Sodium 159 (H) 136 - 145 mmol/L Potassium 3.7 3.5 - 5.1 mmol/L Chloride 123 (H) 97 - 108 mmol/L  
 CO2 27 21 - 32 mmol/L Anion gap 9 5 - 15 mmol/L Glucose 166 (H) 65 - 100 mg/dL BUN 27 (H) 6 - 20 mg/dL Creatinine 0.82 0.70 - 1.30 mg/dL BUN/Creatinine ratio 33 (H) 12 - 20 GFR est AA >60 >60 ml/min/1.73m2 GFR est non-AA >60 >60 ml/min/1.73m2 Calcium 8.6 8.5 - 10.1 mg/dL MAGNESIUM Collection Time: 01/01/21  6:06 AM  
Result Value Ref Range Magnesium 2.8 (H) 1.6 - 2.4 mg/dL GLUCOSE, POC Collection Time: 01/01/21  7:43 AM  
Result Value Ref Range Glucose (POC) 164 (H) 65 - 100 mg/dL Performed by Rigo Kumar, POC Collection Time: 01/01/21 11:49 AM  
Result Value Ref Range Glucose (POC) 175 (H) 65 - 100 mg/dL Performed by Shannan Page Trended key labs include: 
Recent Labs 01/01/21 
2717 12/31/20 
1156 WBC 6.2 8.4 HGB 9.2* 9.7* HCT 28.7* 30.4*  471 Recent Labs 01/01/21 
9680 12/31/20 
0418 * 155* K 3.7 3.5 * 119* CO2 27 26 * 187* BUN 27* 34* CREA 0.82 0.97 CA 8.6 8.6 MG 2.8*  --   
 
 
Imaging Last 24 Hours: 
No results found. Assessment//Plan Patient Active Problem List  
 Diagnosis Date Noted  Dehydration 01/01/2021  Diabetes mellitus type 2, controlled ([facility] 01/01/2021  Hypoxic 12/31/2020  Sepsis ([facility] 12/29/2020  Respiratory failure, unspecified with hypoxia ([facility] 12/29/2020  Hypoxia 12/01/2020  PNA (pneumonia) 12/01/2020  NELSON (acute kidney injury) ([facility] 12/01/2020 PNA (pneumonia) -CXR clear but +cough, +hypoxia, + secretions w sxn but unsure if patient had any vomiting episode and patient improved significantly with deep suctioning and oral care 
- Status post PEG tube placement recently - Jevity 1.2 at 25ml/hr increase by 10 mls Q6H until @ goal of 55ml/hr hours. Free H20 flushes 120mls Q6H increase to gaol pf 160ml, with residual checks Q6H x 24H. 
- Repeat consult to Nutrition for any updated recommendations  
- Aspiration precautions, frequent oral care  
- IV ABX Zosyn + Levaquin - Supplemental O2 prn or BiPap to keep sats >92% 
- Continue Duonebs Q4H prn 
- 12/31: overall secondary to poor oral care and patient inability to handle his own secretion that most likely caused his respiratory event as currently patient is not showing evidence of residuals on tube feedings and patient has quickly resolved his extreme hypoxic respiratory failure on arrival 
-1/1/2021 patient on room air with sats maintaining mostly around 95% above. Patient is on Zosyn and Levaquin with duo nebs which will be continued. His breath sounds are coarse upper airway noises continue current treatment for pneumonia possible aspiration Hypoxia Patient is no longer hypoxic with good sats on room air Dehydration Patient with increasing sodium and potassium will start D5 half-normal saline and monitor electrolytes and kidney functions. Diabetes mellitus type 2, controlled ([facility] Blood sugar is borderline high and sliding scale insulin will continue same Electronically signed by Gamaliel Bartholomew MD, [facility] on 1/1/2021 at 1:06 PM

## 2021-01-02 NOTE — PROGRESS NOTES
Progress Note Date:1/2/2021       Room:205/01 Patient Miryam Acosta. YOB: 1955     Age:65 y.o. Subjective Brendon Edwards Jr. is a 60 y. o. male bed bound, minimally verbal resident of [facility] facility with PMH of hypertension, diabetes, glaucoma w blindness, and prior hx CVA who presents to ED for evaluation of low O2 sats and cough. The patient was unable to provide HPI 2/2 to above mentioned factors, and so pertinent history obtained from prior hospital records. In ED, pt was febrile, hypoxic with initial O2 79%, improved after being placed on BiPap per RT. sat leukocytosis and left shift. He was suctioned and oral care given for copious secretions. He has an indwelling PEG. Although CXR was initially negative for new infiltrate, and showed only resolved infiltrate from prior admission for PNA on 12/1/2020. Patient was admitted to the hospital for treatment. 
  
Patient seen lying in bed this morning with eyes closed morning to touch and movement. 
  
Review of Systems Unable to perform ROS: Dementia Objective Vitals Last 24 Hours: 
Patient Vitals for the past 24 hrs: 
 Temp Pulse Resp BP SpO2  
01/02/21 1239 97.9 °F (36.6 °C) 99 20 100/63 96 % 01/02/21 1125     99 % 01/02/21 0748 98.4 °F (36.9 °C) (!) 103 20 108/71 97 % 01/02/21 0712     97 % 01/02/21 0441 99.7 °F (37.6 °C) (!) 102 26 107/75 95 % 01/02/21 0000 99.3 °F (37.4 °C) 98 20 100/64 93 % 01/01/21 2035     95 % 01/01/21 2034     95 % 01/01/21 1935 99.3 °F (37.4 °C) 97 24 111/71 96 % 01/01/21 1617 99 °F (37.2 °C) (!) 101 18 111/68 94 % 01/01/21 1544     95 % I/O (24Hr): No intake or output data in the 24 hours ending 01/02/21 1510 Physical Exam: 
General Appearance: In bed eyes closed Respiratory: Normal respiratory rate.  Breath sounds coarse   
Heart: S1 normal and S2 normal.  No tachycardia Abdomen: Soft and flat.  Bowel sounds are normal. No rebound or guarding. No tenderness to palpation. Extremities: Normal range of motion.  No acute deformities. Skin:  Warm and dry. Medications Current Facility-Administered Medications Medication Dose Route Frequency  dextrose 5 % - 0.45% NaCl infusion  100 mL/hr IntraVENous CONTINUOUS  piperacillin-tazobactam (ZOSYN) 3.375 g in 0.9% sodium chloride (MBP/ADV) 100 mL MBP  3.375 g IntraVENous Q8H  
 levoFLOXacin (LEVAQUIN) 750 mg in D5W IVPB  750 mg IntraVENous Q24H  
 albuterol-ipratropium (DUO-NEB) 2.5 MG-0.5 MG/3 ML  3 mL Nebulization QID RT  
 glucose chewable tablet 16 g  4 Tab Oral PRN  
 dextrose (D50W) injection syrg 12.5-25 g  25-50 mL IntraVENous PRN  
 glucagon (GLUCAGEN) injection 1 mg  1 mg IntraMUSCular PRN  
 insulin lispro (HUMALOG) injection   SubCUTAneous Q6H  
 clopidogreL (PLAVIX) tablet 75 mg  75 mg PEG Tube DAILY  sodium chloride (NS) flush 5-40 mL  5-40 mL IntraVENous Q8H  
 sodium chloride (NS) flush 5-40 mL  5-40 mL IntraVENous PRN  
 acetaminophen (TYLENOL) tablet 650 mg  650 mg Oral Q6H PRN Or  
 acetaminophen (TYLENOL) suppository 650 mg  650 mg Rectal Q6H PRN  polyethylene glycol (MIRALAX) packet 17 g  17 g Oral DAILY PRN  promethazine (PHENERGAN) tablet 12.5 mg  12.5 mg Oral Q6H PRN Or  
 ondansetron (ZOFRAN) injection 4 mg  4 mg IntraVENous Q6H PRN  
 enoxaparin (LOVENOX) injection 40 mg  40 mg SubCUTAneous DAILY Allergies Patient has no known allergies. Labs/Imaging/Diagnostics Labs: 
Recent Results (from the past 24 hour(s)) GLUCOSE, POC Collection Time: 01/01/21  4:24 PM  
Result Value Ref Range Glucose (POC) 207 (H) 65 - 100 mg/dL Performed by Tosha Sauer   
CBC WITH AUTOMATED DIFF Collection Time: 01/02/21  5:20 AM  
Result Value Ref Range WBC 7.3 4.4 - 11.3 K/uL  
 RBC 3.06 (L) 4.50 - 5.90 M/uL HGB 8.9 (L) 13.5 - 17.5 g/dL HCT 27.7 (L) 41 - 53 %  MCV 90.6 80 - 100 FL  
 MCH 29.0 (L) 31 - 34 PG  
 MCHC 32.1 31.0 - 36.0 g/dL  
 RDW 15.8 (H) 11.5 - 14.5 % PLATELET 988 K/uL MPV 8.6 6.5 - 11.5 FL  
 NRBC 10.0  WBC ABSOLUTE NRBC 0.00 K/uL NEUTROPHILS 66 42 - 75 % LYMPHOCYTES 26 20.5 - 51.1 % MONOCYTES 6 1.7 - 9.3 % EOSINOPHILS 1 0.9 - 2.9 % BASOPHILS 1 0.0 - 2.5 % ABS. NEUTROPHILS 4.8 1.8 - 7.7 K/UL  
 ABS. LYMPHOCYTES 1.9 1.0 - 4.8 K/UL  
 ABS. MONOCYTES 0.4 0.2 - 2.4 K/UL  
 ABS. EOSINOPHILS 0.1 0.0 - 0.7 K/UL  
 ABS. BASOPHILS 0.0 0.0 - 0.2 K/UL METABOLIC PANEL, BASIC Collection Time: 01/02/21  5:20 AM  
Result Value Ref Range Sodium 157 (H) 136 - 145 mmol/L Potassium 3.4 (L) 3.5 - 5.1 mmol/L Chloride 122 (H) 97 - 108 mmol/L  
 CO2 26 21 - 32 mmol/L Anion gap 9 5 - 15 mmol/L Glucose 220 (H) 65 - 100 mg/dL BUN 19 6 - 20 mg/dL Creatinine 0.75 0.70 - 1.30 mg/dL BUN/Creatinine ratio 25 (H) 12 - 20 GFR est AA >60 >60 ml/min/1.73m2 GFR est non-AA >60 >60 ml/min/1.73m2 Calcium 8.2 (L) 8.5 - 10.1 mg/dL GLUCOSE, POC Collection Time: 01/02/21  7:15 AM  
Result Value Ref Range Glucose (POC) 235 (H) 65 - 100 mg/dL Performed by Juan C Arcos, POC Collection Time: 01/02/21 12:03 PM  
Result Value Ref Range Glucose (POC) 168 (H) 65 - 100 mg/dL Performed by Juan C Arcos, POC Collection Time: 01/02/21  3:07 PM  
Result Value Ref Range Glucose (POC) 143 (H) 65 - 100 mg/dL Performed by Gita Adams Trended key labs include: 
Recent Labs 01/02/21 
0216 01/01/21 
5891 12/31/20 
9496 WBC 7.3 6.2 8.4 HGB 8.9* 9.2* 9.7* HCT 27.7* 28.7* 30.4*  459 471 Recent Labs 01/02/21 
4503 01/01/21 
6247 12/31/20 
4929 * 159* 155* K 3.4* 3.7 3.5 * 123* 119* CO2 26 27 26 * 166* 187* BUN 19 27* 34* CREA 0.75 0.82 0.97 CA 8.2* 8.6 8.6 MG  --  2.8*  --   
 
 
Imaging Last 24 Hours: 
No results found. Assessment//Plan Patient Active Problem List  
 Diagnosis Date Noted  Dehydration 01/01/2021  Diabetes mellitus type 2, controlled ([facility] 01/01/2021  Hypoxic 12/31/2020  Sepsis ([facility] 12/29/2020  Respiratory failure, unspecified with hypoxia ([facility] 12/29/2020  Hypoxia 12/01/2020  PNA (pneumonia) 12/01/2020  NELSON (acute kidney injury) ([facility] 12/01/2020 PNA (pneumonia) -CXR clear but +cough, +hypoxia, + secretions w sxn but unsure if patient had any vomiting episode and patient improved significantly with deep suctioning and oral care 
- Status post PEG tube placement recently - Jevity 1.2 at 25ml/hr increase by 10 mls Q6H until @ goal of 55ml/hr hours. Free H20 flushes 120mls Q6H increase to gaol pf 160ml, with residual checks Q6H x 24H. 
- Repeat consult to Nutrition for any updated recommendations  
- Aspiration precautions, frequent oral care  
- IV ABX Zosyn + Levaquin - Supplemental O2 prn or BiPap to keep sats >92% 
- Continue Duonebs Q4H prn 
- 12/31: overall secondary to poor oral care and patient inability to handle his own secretion that most likely caused his respiratory event as currently patient is not showing evidence of residuals on tube feedings and patient has quickly resolved his extreme hypoxic respiratory failure on arrival 
-1/1/2021 patient on room air with sats maintaining mostly around 95% above. Patient is on Zosyn and Levaquin with duo nebs which will be continued. His breath sounds are coarse upper airway noises continue current treatment for pneumonia possible aspiration 
-1/2/2021 good sats on room air continue with antibiotics and duo nebs. Hypoxia Patient is no longer hypoxic with good sats on room air Dehydration 
-1/1/2021 patient with increasing sodium and potassium will start D5 half-normal saline and monitor electrolytes and kidney functions. 
-1/2/2021 sodium, chloride remains elevated but BUN and creatinine normal we will continue with hydration continue monitoring electrolytes and kidney function Diabetes mellitus type 2, controlled ([facility] 
-1/1/2021 blood sugar is borderline high and sliding scale insulin will continue same 
-1/2/2021 will change fluids to half-normal saline his blood sugars remain elevated.  
 
 
 
 
 
 
 
Electronically signed by Chasidy Khan MD, [facility] on 1/2/2021 at 3:04 PM

## 2021-01-02 NOTE — ROUTINE PROCESS
Patient seen and assessed by the bedside, non-verbal. Oral care completed by suctioning, no residual at this time via peg tube. Patient continues to have jevity 1.2 via peg tube with G6 water flush of 160 mls. Peg tube site is clean and intact. Prescribed nutritional supplement given by the peg tube. Patient propped up in bed to prevent aspiration. Vital signs recorded per chart.

## 2021-01-02 NOTE — ROUTINE PROCESS
No residual from tube feeding at 0230. Two bm this shift. Moaning and crying on and off during the night.

## 2021-01-03 NOTE — PROGRESS NOTES
Problem: Falls - Risk of 
Goal: *Absence of Falls Description: Document Brenda Led Fall Risk and appropriate interventions in the flowsheet. Outcome: Progressing Towards Goal 
Note: Fall Risk Interventions: 
Mobility Interventions: Bed/chair exit alarm, OT consult for ADLs Mentation Interventions: Bed/chair exit alarm, Toileting rounds Medication Interventions: Bed/chair exit alarm Elimination Interventions: Toileting schedule/hourly rounds Problem: Patient Education: Go to Patient Education Activity Goal: Patient/Family Education Outcome: Progressing Towards Goal 
  
Problem: Pressure Injury - Risk of 
Goal: *Prevention of pressure injury Description: Document Steve Scale and appropriate interventions in the flowsheet. Outcome: Progressing Towards Goal 
Note: Pressure Injury Interventions: 
Sensory Interventions: Assess changes in LOC Moisture Interventions: Absorbent underpads, Minimize layers, Limit adult briefs Activity Interventions: PT/OT evaluation, Pressure redistribution bed/mattress(bed type) Mobility Interventions: PT/OT evaluation Nutrition Interventions: Document food/fluid/supplement intake Friction and Shear Interventions: Minimize layers, HOB 30 degrees or less Problem: Patient Education: Go to Patient Education Activity Goal: Patient/Family Education Outcome: Progressing Towards Goal 
  
Problem: Patient Education: Go to Patient Education Activity Goal: Patient/Family Education Outcome: Progressing Towards Goal 
  
Problem: Pressure Injury - Risk of 
Goal: *Prevention of pressure injury Description: Document Steve Scale and appropriate interventions in the flowsheet. Outcome: Progressing Towards Goal 
Note: Pressure Injury Interventions: 
Sensory Interventions: Assess changes in LOC Moisture Interventions: Absorbent underpads, Minimize layers, Limit adult briefs Activity Interventions: PT/OT evaluation, Pressure redistribution bed/mattress(bed type) Mobility Interventions: PT/OT evaluation Nutrition Interventions: Document food/fluid/supplement intake Friction and Shear Interventions: Minimize layers, HOB 30 degrees or less Problem: Patient Education: Go to Patient Education Activity Goal: Patient/Family Education Outcome: Progressing Towards Goal

## 2021-01-03 NOTE — PROGRESS NOTES
Progress Note Date:1/3/2021       Room:205/01 Patient Maico Gay. YOB: 1955     Age:65 y.o. Subjective Timbo Evans Jr. is a 60 y. o. male bed bound, minimally verbal resident of [facility] facility with PMH of hypertension, diabetes, glaucoma w blindness, and prior hx CVA who presents to ED for evaluation of low O2 sats and cough. The patient was unable to provide HPI 2/2 to above mentioned factors, and so pertinent history obtained from prior hospital records. In ED, pt was febrile, hypoxic with initial O2 79%, improved after being placed on BiPap per RT. sat leukocytosis and left shift. He was suctioned and oral care given for copious secretions. He has an indwelling PEG. Although CXR was initially negative for new infiltrate, and showed only resolved infiltrate from prior admission for PNA on 12/1/2020. Patient was admitted to the hospital for treatment. 
  
Patient seen lying in bed this morning with eyes closed morning to touch and movement. 
  
Review of Systems  
Unable to perform ROS: Dementia  
 
 
Objective Vitals Last 24 Hours: 
Patient Vitals for the past 24 hrs: 
 Temp Pulse Resp BP SpO2  
01/03/21 0758 97 °F (36.1 °C) 93 22 106/72 97 % 01/03/21 0704     97 % 01/03/21 0520 97.4 °F (36.3 °C) 90 20 112/70 95 % 01/03/21 0020 97.7 °F (36.5 °C) 92 20 109/70 97 % 01/02/21 2014 97.7 °F (36.5 °C) 97 20 115/73 98 % 01/02/21 1927     97 % 01/02/21 1926     97 % 01/02/21 1615 98 °F (36.7 °C) 97 20 100/65 97 % 01/02/21 1510     96 % 01/02/21 1239 97.9 °F (36.6 °C) 99 20 100/63 96 % 01/02/21 1125     99 % I/O (24Hr): Intake/Output Summary (Last 24 hours) at 1/3/2021 1009 Last data filed at 1/2/2021 7844 Gross per 24 hour Intake  Output 2 ml Net -2 ml Physical Exam: 
General Appearance: In bed eyes closed Respiratory: Normal respiratory rate.  Breath sounds coarse   
Heart: S1 normal and S2 normal.  No tachycardia Abdomen: Soft and flat.  Bowel sounds are normal. No rebound or guarding. No tenderness to palpation. Skin:  Warm and dry.   
 
Medications Current Facility-Administered Medications Medication Dose Route Frequency  0.45% sodium chloride infusion  100 mL/hr IntraVENous CONTINUOUS  
 dextrose (D50W) injection syrg 12.5-25 g  25-50 mL IntraVENous PRN  piperacillin-tazobactam (ZOSYN) 3.375 g in 0.9% sodium chloride (MBP/ADV) 100 mL MBP  3.375 g IntraVENous Q8H  
 levoFLOXacin (LEVAQUIN) 750 mg in D5W IVPB  750 mg IntraVENous Q24H  
 albuterol-ipratropium (DUO-NEB) 2.5 MG-0.5 MG/3 ML  3 mL Nebulization QID RT  
 glucose chewable tablet 16 g  4 Tab Oral PRN  
 dextrose (D50W) injection syrg 12.5-25 g  25-50 mL IntraVENous PRN  
 glucagon (GLUCAGEN) injection 1 mg  1 mg IntraMUSCular PRN  
 insulin lispro (HUMALOG) injection   SubCUTAneous Q6H  
 clopidogreL (PLAVIX) tablet 75 mg  75 mg PEG Tube DAILY  sodium chloride (NS) flush 5-40 mL  5-40 mL IntraVENous Q8H  
 sodium chloride (NS) flush 5-40 mL  5-40 mL IntraVENous PRN  
 acetaminophen (TYLENOL) tablet 650 mg  650 mg Oral Q6H PRN Or  
 acetaminophen (TYLENOL) suppository 650 mg  650 mg Rectal Q6H PRN  polyethylene glycol (MIRALAX) packet 17 g  17 g Oral DAILY PRN  promethazine (PHENERGAN) tablet 12.5 mg  12.5 mg Oral Q6H PRN Or  
 ondansetron (ZOFRAN) injection 4 mg  4 mg IntraVENous Q6H PRN  
 enoxaparin (LOVENOX) injection 40 mg  40 mg SubCUTAneous DAILY Allergies Patient has no known allergies. Labs/Imaging/Diagnostics Labs: 
Recent Results (from the past 24 hour(s)) GLUCOSE, POC Collection Time: 01/02/21 12:03 PM  
Result Value Ref Range Glucose (POC) 168 (H) 65 - 100 mg/dL Performed by Rudine Gamma, POC Collection Time: 01/02/21  3:07 PM  
Result Value Ref Range Glucose (POC) 143 (H) 65 - 100 mg/dL  Performed by Rudine Gamma, POC  
 Collection Time: 01/02/21 11:38 PM  
Result Value Ref Range Glucose (POC) 115 (H) 65 - 100 mg/dL Performed by Mason Erickson, POC Collection Time: 01/03/21  4:52 AM  
Result Value Ref Range Glucose (POC) 137 (H) 65 - 100 mg/dL Performed by Adina Linton METABOLIC PANEL, BASIC Collection Time: 01/03/21  6:55 AM  
Result Value Ref Range Sodium 152 (H) 136 - 145 mmol/L Potassium 3.7 3.5 - 5.1 mmol/L Chloride 119 (H) 97 - 108 mmol/L  
 CO2 24 21 - 32 mmol/L Anion gap 9 5 - 15 mmol/L Glucose 135 (H) 65 - 100 mg/dL BUN 18 6 - 20 mg/dL Creatinine 0.64 (L) 0.70 - 1.30 mg/dL BUN/Creatinine ratio 28 (H) 12 - 20 GFR est AA >60 >60 ml/min/1.73m2 GFR est non-AA >60 >60 ml/min/1.73m2 Calcium 8.3 (L) 8.5 - 10.1 mg/dL CBC W/O DIFF Collection Time: 01/03/21  6:55 AM  
Result Value Ref Range WBC 6.5 4.4 - 11.3 K/uL  
 RBC 3.10 (L) 4.50 - 5.90 M/uL HGB 8.9 (L) 13.5 - 17.5 g/dL HCT 28.1 (L) 41 - 53 % MCV 90.6 80 - 100 FL  
 MCH 28.7 (L) 31 - 34 PG  
 MCHC 31.7 31.0 - 36.0 g/dL  
 RDW 16.2 (H) 11.5 - 14.5 % PLATELET 950 K/uL MPV 8.5 6.5 - 11.5 FL  
 NRBC 10.0  WBC ABSOLUTE NRBC 0.00 K/uL GLUCOSE, POC Collection Time: 01/03/21  7:00 AM  
Result Value Ref Range Glucose (POC) 137 (H) 65 - 100 mg/dL Performed by Dinora July Trended estrella labs include: 
Recent Labs 01/03/21 
7819 01/02/21 
1335 01/01/21 
4900 WBC 6.5 7.3 6.2 HGB 8.9* 8.9* 9.2* HCT 28.1* 27.7* 28.7*  433 459 Recent Labs 01/03/21 
6170 01/02/21 
2663 01/01/21 
3559 * 157* 159*  
K 3.7 3.4* 3.7 * 122* 123* CO2 24 26 27 * 220* 166* BUN 18 19 27* CREA 0.64* 0.75 0.82 CA 8.3* 8.2* 8.6 MG  --   --  2.8* Imaging Last 24 Hours: 
No results found. Assessment//Plan Patient Active Problem List  
 Diagnosis Date Noted  Dehydration 01/01/2021  [facility] type 2, [facility] 01/01/2021  Hypoxic 12/31/2020  Sepsis ([facility] 12/29/2020  Respiratory failure, unspecified with hypoxia ([facility] 12/29/2020  Hypoxia 12/01/2020  PNA (pneumonia) 12/01/2020  NELSON (acute kidney injury) ([facility] 12/01/2020 PNA (pneumonia) -CXR clear but +cough, +hypoxia, + secretions w sxn but unsure if patient had any vomiting episode and patient improved significantly with deep suctioning and oral care 
- Status post PEG tube placement recently - Jevity 1.2 at 25ml/hr increase by 10 mls Q6H until @ goal of 55ml/hr hours. Free H20 flushes 120mls Q6H increase to gaol pf 160ml, with residual checks Q6H x 24H. 
- Repeat consult to Nutrition for any updated recommendations  
- Aspiration precautions, frequent oral care  
- IV ABX Zosyn + Levaquin - Supplemental O2 prn or BiPap to keep sats >92% 
- Continue Duonebs Q4H prn 
- 12/31: overall secondary to poor oral care and patient inability to handle his own secretion that most likely caused his respiratory event as currently patient is not showing evidence of residuals on tube feedings and patient has quickly resolved his extreme hypoxic respiratory failure on arrival 
-1/1/2021 patient on room air with sats maintaining mostly around 95% above. Patient is on Zosyn and Levaquin with duo nebs which will be continued. His breath sounds are coarse upper airway noises continue current treatment for pneumonia possible aspiration 
-1/2/2021 good sats on room air continue with antibiotics and duo nebs. -1/3/2021 no change with sats of greater than 93% on room air continue current treatment Hypoxia Patient is no longer hypoxic with good sats on room air Dehydration 
-1/1/2021 patient with increasing sodium and potassium will start D5 half-normal saline and monitor electrolytes and kidney functions. 
-1/2/2021 sodium, chloride remains elevated but BUN and creatinine normal we will continue with hydration continue monitoring electrolytes and kidney function 
-1/3/2021 sodium, chloride and been improving on hydration and with the tube feeds continue with current treatment continue monitoring electrolytes Diabetes mellitus type 2, controlled ([facility] 
-1/1/2021 blood sugar is borderline high and sliding scale insulin will continue same 
-1/2/2021 will change fluids to half-normal saline his blood sugars remain elevated. 
-1/3/2021 blood sugars improved with half-normal saline fluids which will be continued.  
 
 
 
 
 
 
 
Electronically signed by Amy Cunningham MD, Bro Ospina on 1/3/2021 at 10:04 AM

## 2021-01-04 NOTE — ROUTINE PROCESS
Pt alert, nonverbal.  Npo, Jevity 1.2 @ 55 cc/hr, with 160 H20 flush q 6 hours. Peg tube + placement, no residuals noted. Fsbs 112 @ 12:00. Respirations even and unlabored. Large soft bm noted. No s/s of pain or distress.

## 2021-01-04 NOTE — PROGRESS NOTES
Problem: Falls - Risk of 
Goal: *Absence of Falls Description: Document [place] Cease Fall Risk and appropriate interventions in the flowsheet. Outcome: Progressing Towards Goal 
Note: Fall Risk Interventions: 
Mobility Interventions: Bed/chair exit alarm, OT consult for ADLs Mentation Interventions: Bed/chair exit alarm, Toileting rounds Medication Interventions: Bed/chair exit alarm Elimination Interventions: Toileting schedule/hourly rounds Problem: Patient Education: Go to Patient Education Activity Goal: Patient/Family Education Outcome: Progressing Towards Goal 
  
Problem: Pressure Injury - Risk of 
Goal: *Prevention of pressure injury Description: Document Steve Scale and appropriate interventions in the flowsheet. Outcome: Progressing Towards Goal 
Note: Pressure Injury Interventions: 
Sensory Interventions: Assess changes in LOC Moisture Interventions: Absorbent underpads, Minimize layers, Limit adult briefs Activity Interventions: PT/OT evaluation, Pressure redistribution bed/mattress(bed type) Mobility Interventions: PT/OT evaluation Nutrition Interventions: Document food/fluid/supplement intake Friction and Shear Interventions: Minimize layers, HOB 30 degrees or less Problem: Patient Education: Go to Patient Education Activity Goal: Patient/Family Education Outcome: Progressing Towards Goal 
  
Problem: Patient Education: Go to Patient Education Activity Goal: Patient/Family Education Outcome: Progressing Towards Goal 
  
Problem: Patient Education: Go to Patient Education Activity Goal: Patient/Family Education Outcome: Progressing Towards Goal

## 2021-01-04 NOTE — PROGRESS NOTES
Nutrition Assessment Type and Reason for Visit: Mabel Montejo Nutrition Recommendations/Plan:  
 
Continue w/ current TF & Nutrition supplement of prosource @ AM & H.S. Medpass Monitor & Record daily wts & residuals Rec MVI Request updated total protein, prealbumin & albumin Will continue follow x4days Nutrition Assessment:  60 y.o. male bed bound, minimally verbal resident of [facility] facility with PMH of hypertension, diabetes, glaucoma w blindness, and prior hx CVA who presents to ED for evaluation of low O2 sats and cough. Noted to have poor oral care in the ED. Pt was recently admitted x3wk (12/3) for a peg tube placement. TF rec's from 12/3 as the following: continous Jevity 1.2 @ 55ml w/ water flushes [id number], noted to have tolerated w/o no complications. Re-evaulated current nutrition status, noted a wt. change of 7.6 x1m ( per RD's previous noted pt's was weight on bed scale at 130lbs (12/3), noted during current admitted weight 119lbs on bed scale (12/29), weighted on bed scale @ 120lbs (12/30) , observed fat and muscle wasting, ill-appearing, reported to have stage 2 pressure ulcers on sacrum and bilateral toes. ??? unknown unitentionally wt. loss r/t discharge TF rec's provided 1,584/day. Weight pt at bedside @ 126lbs, + Wt. gain of 6lbs x5days. Nutriton related lab values: Na (152), glucose (135), Cre (.64), Ca (8.3). Meds: heparin, humalog Malnutrition Assessment: 
Malnutrition Status: Severe malnutrition Estimated Daily Nutrient Needs: 
Energy (kcal):  1,600kcals ( 1,161. 2BMR 1.4) Protein (g):  80g (1.4/kg) Fluid (ml/day):  1,710ml (30ml/kg) Nutrition Related Findings:  cogition: alert, noncommunicative. Digestive system: abdomen: soft, non-tender, no reported n/c, v/d, oral dysphagia. extremities/muscle/bone: bedbound, fat and muscle wasting observed. Skin: stage II pressure ucler Current Nutrition Therapies: DIET TUBE FEEDING Jevity 1.2 DIET NUTRITIONAL SUPPLEMENTS HS Snack, AM Snack; ProSource Anthropometric Measures: 
· Height:  4' 9\" (144.8 cm) · Current Body Wt:  57.2 kg (126 lb) · BMI: 27.3 Nutrition Diagnosis:  
· Unintended weight loss related to other (specify)(ubw 130#, and admission wt. of 119lbs) as evidenced by weight loss greater than or equal to 5% in 1 month · Severe malnutrition related to increased demand for energy/nutrients as evidenced by severe loss of subcutaneous fat, severe muscle loss, weight loss greater than or equal to 5% in 1 month Nutrition Intervention: 
Food and/or Nutrient Delivery: Continue oral nutrition supplement, Continue tube feeding Nutrition Education and Counseling: No recommendations at this time Coordination of Nutrition Care: Continue to monitor while inpatient, Interdisciplinary rounds Goals: 
tolerable TF- residuals <200ml, Wt. gain 1-2kg x1wk Nutrition Monitoring and Evaluation:  
Behavioral-Environmental Outcomes: None identified Food/Nutrient Intake Outcomes: Enteral nutrition intake/tolerance Physical Signs/Symptoms Outcomes: Weight Discharge Planning:   
No discharge needs at this time

## 2021-01-05 NOTE — DISCHARGE SUMMARY
Physician Discharge Summary Patient: Tico Olivo MRN: [id number] YOB: 1955  Age: 60 y.o. Sex: male PCP: Alaina Jesus MD 
 
Allergies: Patient has no known allergies. Admit date: 12/29/2020 Admitting Provider: Anuel Zhao MD 
 
Discharge date: 1/5/2021 Discharging Provider: Anuel Zhao MD 
 
* Admission Diagnoses:  
Sepsis ([facility] [A41.9] Res[facility] failure, unspecified with hypoxia [facility] [J96.91] Hypoxia [R09.02] PNA (pneumonia) [J18.9] Hypoxic [R09.02] * Discharge Diagnoses:   
Hospital Problems as of 1/5/2021 Never Reviewed Codes Class Noted - Resolved POA Dehydration ICD-10-CM: E86.0 ICD-9-CM: 276.51  1/1/2021 - Present Unknown Diabetes mellitus type 2, controlled ([facility] ICD-10-CM: E11.9 ICD-9-CM: 250.00  1/1/2021 - Present Unknown Hypoxic ICD-10-CM: R09.02 
ICD-9-CM: 799.02  12/31/2020 - Present Unknown Sepsis ([facility] ICD-10-CM: A41.9 ICD-9-CM: 038.9, 995.91  12/29/2020 - Present Unknown Respiratory failure, unspecified with hypoxia ([facility] ICD-10-CM: J96.91 
ICD-9-CM: 518.81  12/29/2020 - Present Unknown Hypoxia ICD-10-CM: R09.02 
ICD-9-CM: 799.02  12/1/2020 - Present Unknown PNA (pneumonia) ICD-10-CM: J18.9 ICD-9-CM: 069  12/1/2020 - Present Unknown * Hospital Course:  
Subjective    
As per Admitting provider noted Mo Saenz Jr. is a 60 y. o. male bed bound, minimally verbal resident of [facility] facility with PMH of hypertension, diabetes, glaucoma w blindness, and prior hx CVA who presents to ED for evaluation of low O2 sats and cough. The patient is unable to provide HPI 2/2 to above mentioned factors, and so pertinent history obtained from prior hospital records. In ED, pt is febrile, hypoxic with initial O2 79%, improved after being placed on BiPap per RT. sat leukocytosis and left shift. He was suctioned and oral care given for copious secretions.  He does have indwelling PEG. Although CXR was initially negative for new infiltrate, and shows only resolved infiltrate from prior admission for PNA on 12/1/2020. Hospitalist consulted for Observation and further management. Acute possible aspiration pneumonia vs Pneumonitis  
- CXR clear but +cough, +hypoxia, + secretions w sxn but unsure if patient had any vomiting episode and patient improved significantly with deep suctioning and oral care 
- Status post PEG tube placement recently - Jevity 1.2 at 25ml/hr increase by 10 mls Q6H until @ goal of 55ml/hr hours. Free H20 flushes 120mls Q6H increase to gaol pf 160ml, with residual checks Q6H x 24H. 
- Repeat consult to Nutrition for any updated recommendations  
- Aspiration precautions, frequent oral care  
- IV ABX Zosyn + Levaquin - Supplemental O2 prn or BiPap to keep sats >92% 
- Continue Duonebs Q4H prn 
- 12/31: overall secondary to poor oral care and patient inability to handle his own secretion that most likely caused his respiratory event as currently patient is not showing evidence of residuals on tube feedings and patient has quickly resolved his extreme hypoxic respiratory failure on arrival 
-Patient overall is afebrile has good O2 saturation on room air and will transition patient from IV Zosyn and Levaquin dosing to Augmentin 875 mg / 125 mg via PEG twice daily for 5 more days. Patient will need continued oral care and suctioning per shift. 
  
Fever w Hypoxia - LTC resident - Rapid Covid swab shows as not detected 
-Patient has been afebrile and on room air continues to improve 
- rapid COVID swab done for placement done in last 24 hours and was found to be negative.  
  
Diabetes - Accuchecks Q6H with sliding scale coverage Glucose have been stable on current jevity feeding.  
 
  
CVA 
- Prior hx of CVA 
- Continue Plavix  
  
Hypertension 
- blood pressure monitored q4hrs and has been in low normal range. HR has been in 80's.  May need low dose beta blocker if blood pressure allows but will hold on starting at this time. * Procedures: * No surgery found * Consults: NONE Will need Wound care consultation on arrival to [facility] and rehab for eval and treat of sacral decubitus stage 2 healing and left medial and right heal skin breakdown. Not stageable. Vitals Last 24 Hours: 
Patient Vitals for the past 24 hrs: 
 Temp Pulse Resp BP SpO2  
01/05/21 0702 97.3 °F (36.3 °C) 85 20 111/68 97 % 01/05/21 0317 97.2 °F (36.2 °C) 88 18 118/70 97 % 01/05/21 0051 97 °F (36.1 °C) 88 18 104/64 96 % 01/04/21 1940 97.7 °F (36.5 °C) 87 18 107/67 98 % 01/04/21 1907     100 % 01/04/21 1906     100 % 01/04/21 1626     93 % 01/04/21 1615 97.8 °F (36.6 °C) 85 20 98/63 99 % 01/04/21 1133 97.2 °F (36.2 °C) 86 20 103/64 99 % 01/04/21 1113     92 % Discharge Exam: 
General: Alert, cooperative, no distress, appears stated age. Head:  Normocephalic, without obvious abnormality, atraumatic. Eyes:  Conjunctivae/corneas clear. Pupils equal, round, reactive to light. Extraocular movements intact. Lungs:  Clear to auscultation bilaterally. no wheeze, rales, crackles, rhonchi Chest wall: No tenderness or deformity. Heart:  Regular rate and rhythm, S1, S2 normal, no murmur, click, rub or gallop. Abdomen:  Soft, non-tender. Bowel sounds normal. No masses,  No organomegaly. Extremities: Extremities normal, atraumatic, no cyanosis or edema. Pulses: 2+ and symmetric all extremities. Skin: Skin color, texture, turgor normal. No rashes or lesions Neurologic: Awake, Alert, oriented. No obvious gross sensory or motor deficits Labs: 
Recent Results (from the past 24 hour(s)) GLUCOSE, POC Collection Time: 01/04/21 11:38 AM  
Result Value Ref Range Glucose (POC) 112 (H) 65 - 100 mg/dL Performed by Isabelle Ortez, POC Collection Time: 01/04/21  5:33 PM  
Result Value Ref Range  Glucose (POC) 120 (H) 65 - 100 mg/dL Performed by Dee Jacob, POC Collection Time: 01/05/21 12:28 AM  
Result Value Ref Range Glucose (POC) 103 (H) 65 - 100 mg/dL Performed by Don Agrawal   
SARS-COV-2 Collection Time: 01/05/21  1:20 AM  
Result Value Ref Range SARS-CoV-2 Please find results under separate order COVID-19 RAPID TEST Collection Time: 01/05/21  1:20 AM  
Result Value Ref Range Specimen source Negative COVID-19 rapid test Negative (A) Not Detected GLUCOSE, POC Collection Time: 01/05/21  5:43 AM  
Result Value Ref Range Glucose (POC) 100 65 - 100 mg/dL Performed by Don Agrawal Imaging: Xr Chest [facility] Result Date: 12/29/2020 Impression: The cardiomediastinal silhouette is appropriate for age, technique, and lung expansion. 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 The overlying consolidation has cleared. Pulmonary vasculature is not congested. The lungs are essentially clear. No effusion or pneumothorax is seen. * Discharge Condition: improved * Disposition: SNF/LTC 
- Patient at bedside would require a suction catheter apparatus and for oral suctioning and oral care every 6 hours - Check residuals every 6 hours and if greater than 200 hold feedings for 1 hour and restart and then continue to reevaluate and if continued to be elevated evaluate for constipation and inform MD 
-Patient will need twice daily wound care with saline cleanse to heal lesions bilaterally as well as sacral healing ulcer and to apply foam dressing twice daily Discharge Medications: 
Current Discharge Medication List  
  
START taking these medications Details  
amoxicillin-clavulanate (AUGMENTIN) 875-125 mg per tablet 1 Tab by Per G Tube route every twelve (12) hours. Qty: 10 Tab, Refills: 0  
  
polyethylene glycol (MIRALAX) 17 gram packet 1 Packet by Per G Tube route daily as needed for Constipation. Qty: 30 Packet, Refills: 0 Comments: Hold for diarhea  
  
docusate sodium (COLACE) 50 mg/15 mL syrp 30 mL by Per G Tube route two (2) times a day. Qty: 900 mL, Refills: 0 Comments: Hold for diarhea CONTINUE these medications which have NOT CHANGED Details  
albuterol-ipratropium (DUO-NEB) 2.5 mg-0.5 mg/3 ml nebu 3 mL by Nebulization route four (4) times daily. Qty: 30 Nebule, Refills: 0  
  
clopidogreL (PLAVIX) 75 mg tab 1 Tab by PEG Tube route daily. Qty: 30 Tab, Refills: 0  
  
potassium chloride (KLOR-CON) 20 mEq pack 1 Packet by PEG Tube route daily. Qty: 30 Packet, Refills: 0  
  
  
 
 
 
* Follow-up Care/Patient Instructions: Activity: Bedrest 
Diet: Jevity 1.2 at 55 cc an hour continuous with free water flushes of 160 cc every 6 hours Recommend residual checks every 6 hours Follow-up Information Follow up With Specialties Details Why Contact Info Gina Wynn MD Family Medicine   [address] [place] [address] [phone] [employer] and [address] [place] [address] In 3 days  [address] [place] [address]. [facility] [id number] [phone] Spent 35 minutes evaluting and coordinating patient care and discharging patient to [address] [place] [address] and rehab on long-term care of which >50% was spent coordinating and counseling.   
 
Signed: 
Aidee Toney MD 
1/5/2021 
8:32 AM

## 2021-01-05 NOTE — PROGRESS NOTES
Care Management Interventions Mode of Transport at Discharge: [facility] Current Support Network: [phone] Discharge Location Discharge Placement: [address] [place] [address]

## 2021-01-05 NOTE — PROGRESS NOTES
Advance Care Planning Healthcare Decision Maker:  
 
  Primary Decision Maker: Katherine Stafford [facility] - [phone]

## 2021-01-05 NOTE — PROGRESS NOTES
Progress Note Date:1/4/2021       Room:205/01 Patient Krystal Esparza. YOB: 1955     Age:65 y.o. Subjective As per Admitting provider noted Tim Gitelman. is a 60 y.o. male bed bound, minimally verbal resident of [facility] facility with PMH of hypertension, diabetes, glaucoma w blindness, and prior hx CVA who presents to ED for evaluation of low O2 sats and cough. The patient is unable to provide HPI 2/2 to above mentioned factors, and so pertinent history obtained from prior hospital records. In ED, pt is febrile, hypoxic with initial O2 79%, improved after being placed on BiPap per RT. sat leukocytosis and left shift. He was suctioned and oral care given for copious secretions. He does have indwelling PEG. Although CXR was initially negative for new infiltrate, and shows only resolved infiltrate from prior admission for PNA on 12/1/2020. Hospitalist consulted for Observation and further management. Patient seen and evaluated tolerating peg tube feedings. Patient awakens but did not open his eyes. Has been accepted at [facility] and rehab as long-term care patient and will need rapid Covid swab prior to admission patient continues on tube feeding with Jevity 1.2 with goal rate at 55 mL with free water flushes of 160 cc every 6 hours and residual checks every 4 hours. Patient also added Prosource in a.m. and med Pass at night Review of Systems Unable to perform ROS: Dementia Objective Vitals Last 24 Hours: 
Patient Vitals for the past 24 hrs: 
 Temp Pulse Resp BP SpO2  
01/04/21 1940 97.7 °F (36.5 °C) 87 18 107/67 98 % 01/04/21 1907     100 % 01/04/21 1906     100 % 01/04/21 1626     93 % 01/04/21 1615 97.8 °F (36.6 °C) 85 20 98/63 99 % 01/04/21 1133 97.2 °F (36.2 °C) 86 20 103/64 99 % 01/04/21 1113     92 % 01/04/21 0744     100 % 01/04/21 0741 97.1 °F (36.2 °C) 86 22 116/70 100 % 01/04/21 0413 97.4 °F (36.3 °C) 90 22 104/66 96 % 01/04/21 0000 98.1 °F (36.7 °C) 89 22 101/66 97 % I/O (24Hr): No intake or output data in the 24 hours ending 01/04/21 2210 Physical Exam: 
General: Alert, cooperative, no distress, appears stated age. Head:  Normocephalic, without obvious abnormality, atraumatic. Eyes:  Conjunctivae/corneas clear. Pupils equal, round, reactive to light. Extraocular movements intact. Lungs:  Clear to auscultation bilaterally. no wheeze, rales, crackles, rhonchi Chest wall: No tenderness or deformity. Heart:  Regular rate and rhythm, S1, S2 normal, no murmur, click, rub or gallop. Abdomen:  Soft, non-tender. Bowel sounds normal. No masses,  No organomegaly. Extremities: Extremities normal, atraumatic, no cyanosis or edema. Pulses: 2+ and symmetric all extremities. Skin: Skin color, texture, turgor normal. No rashes or lesions Neurologic: A awakens but not oriented no focal deficits Medications Current Facility-Administered Medications Medication Dose Route Frequency  0.45% sodium chloride infusion  100 mL/hr IntraVENous CONTINUOUS  
 dextrose (D50W) injection syrg 12.5-25 g  25-50 mL IntraVENous PRN  piperacillin-tazobactam (ZOSYN) 3.375 g in 0.9% sodium chloride (MBP/ADV) 100 mL MBP  3.375 g IntraVENous Q8H  
 levoFLOXacin (LEVAQUIN) 750 mg in D5W IVPB  750 mg IntraVENous Q24H  
 albuterol-ipratropium (DUO-NEB) 2.5 MG-0.5 MG/3 ML  3 mL Nebulization QID RT  
 glucose chewable tablet 16 g  4 Tab Oral PRN  
 dextrose (D50W) injection syrg 12.5-25 g  25-50 mL IntraVENous PRN  
 glucagon (GLUCAGEN) injection 1 mg  1 mg IntraMUSCular PRN  
 insulin lispro (HUMALOG) injection   SubCUTAneous Q6H  
 clopidogreL (PLAVIX) tablet 75 mg  75 mg PEG Tube DAILY  sodium chloride (NS) flush 5-40 mL  5-40 mL IntraVENous Q8H  
 sodium chloride (NS) flush 5-40 mL  5-40 mL IntraVENous PRN  
 acetaminophen (TYLENOL) tablet 650 mg  650 mg Oral Q6H PRN  Or  
 acetaminophen (TYLENOL) suppository 650 mg  650 mg Rectal Q6H PRN  polyethylene glycol (MIRALAX) packet 17 g  17 g Oral DAILY PRN  promethazine (PHENERGAN) tablet 12.5 mg  12.5 mg Oral Q6H PRN Or  
 ondansetron (ZOFRAN) injection 4 mg  4 mg IntraVENous Q6H PRN  
 enoxaparin (LOVENOX) injection 40 mg  40 mg SubCUTAneous DAILY Allergies Patient has no known allergies. Labs/Imaging/Diagnostics Labs: 
Recent Results (from the past 48 hour(s)) GLUCOSE, POC Collection Time: 01/02/21 11:38 PM  
Result Value Ref Range Glucose (POC) 115 (H) 65 - 100 mg/dL Performed by Rosmery Thomas, POC Collection Time: 01/03/21  4:52 AM  
Result Value Ref Range Glucose (POC) 137 (H) 65 - 100 mg/dL Performed by Count includes the [facility] METABOLIC PANEL, BASIC Collection Time: 01/03/21  6:55 AM  
Result Value Ref Range Sodium 152 (H) 136 - 145 mmol/L Potassium 3.7 3.5 - 5.1 mmol/L Chloride 119 (H) 97 - 108 mmol/L  
 CO2 24 21 - 32 mmol/L Anion gap 9 5 - 15 mmol/L Glucose 135 (H) 65 - 100 mg/dL BUN 18 6 - 20 mg/dL Creatinine 0.64 (L) 0.70 - 1.30 mg/dL BUN/Creatinine ratio 28 (H) 12 - 20 GFR est AA >60 >60 ml/min/1.73m2 GFR est non-AA >60 >60 ml/min/1.73m2 Calcium 8.3 (L) 8.5 - 10.1 mg/dL CBC W/O DIFF Collection Time: 01/03/21  6:55 AM  
Result Value Ref Range WBC 6.5 4.4 - 11.3 K/uL  
 RBC 3.10 (L) 4.50 - 5.90 M/uL HGB 8.9 (L) 13.5 - 17.5 g/dL HCT 28.1 (L) 41 - 53 % MCV 90.6 80 - 100 FL  
 MCH 28.7 (L) 31 - 34 PG  
 MCHC 31.7 31.0 - 36.0 g/dL  
 RDW 16.2 (H) 11.5 - 14.5 % PLATELET 695 K/uL MPV 8.5 6.5 - 11.5 FL  
 NRBC 10.0  WBC ABSOLUTE NRBC 0.00 K/uL GLUCOSE, POC Collection Time: 01/03/21  7:00 AM  
Result Value Ref Range Glucose (POC) 137 (H) 65 - 100 mg/dL Performed by Savannah Strickland, POC Collection Time: 01/03/21 11:25 AM  
Result Value Ref Range Glucose (POC) 167 (H) 65 - 100 mg/dL Performed by CAMILA SANTIAGO   
GLUCOSE, POC  
 Collection Time: 01/03/21  3:56 PM  
Result Value Ref Range  
 Glucose (POC) 124 (H) 65 - 100 mg/dL  
 Performed by CAMILA SANTIAGO   
GLUCOSE, POC  
 Collection Time: 01/03/21 11:01 PM  
Result Value Ref Range  
 Glucose (POC) 118 (H) 65 - 100 mg/dL  
 Performed by LOLA GARCIA   
GLUCOSE, POC  
 Collection Time: 01/04/21  5:01 AM  
Result Value Ref Range  
 Glucose (POC) 107 (H) 65 - 100 mg/dL  
 Performed by LOLA GARCIA   
CBC W/O DIFF  
 Collection Time: 01/04/21  5:19 AM  
Result Value Ref Range  
 WBC 5.3 4.4 - 11.3 K/uL  
 RBC 3.00 (L) 4.50 - 5.90 M/uL  
 HGB 8.7 (L) 13.5 - 17.5 g/dL  
 HCT 26.9 (L) 41 - 53 %  
 MCV 89.5 80 - 100 FL  
 MCH 29.1 (L) 31 - 34 PG  
 MCHC 32.5 31.0 - 36.0 g/dL  
 RDW 15.8 (H) 11.5 - 14.5 %  
 PLATELET 366 K/uL  
 MPV 8.4 6.5 - 11.5 FL  
 NRBC 10.0  WBC  
 ABSOLUTE NRBC 0.01 K/uL  
GLUCOSE, POC  
 Collection Time: 01/04/21 11:38 AM  
Result Value Ref Range  
 Glucose (POC) 112 (H) 65 - 100 mg/dL  
 Performed by CONNOR LEONIDAS   
GLUCOSE, POC  
 Collection Time: 01/04/21  5:33 PM  
Result Value Ref Range  
 Glucose (POC) 120 (H) 65 - 100 mg/dL  
 Performed by RYAN LEONIDAS   
  
 
Imaging: 
Xr Chest Port 
 
Result Date: 12/29/2020 
Impression: The cardiomediastinal silhouette is appropriate for age, technique, and lung expansion. 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 The overlying consolidation has cleared. Pulmonary vasculature is not congested. The lungs are essentially clear. No effusion or pneumothorax is seen.  
  
 
Assessment//Plan   
    
 
Problem List: 
Hospital Problems  Never Reviewed  
       Codes Class Noted POA  
 Dehydration ICD-10-CM: E86.0 
ICD-9-CM: 276.51  1/1/2021 Unknown  
   
 Diabetes mellitus type 2, controlled (HCC) ICD-10-CM: E11.9 
ICD-9-CM: 250.00  1/1/2021 Unknown  
   
 Hypoxic ICD-10-CM: R09.02 
ICD-9-CM: 799.02  12/31/2020 Unknown  
   
 Sepsis (HCC) ICD-10-CM: A41.9 
ICD-9-CM:  038.9, 995.91  12/29/2020 Unknown Respiratory failure, unspecified with hypoxia ([facility] [address] ICD-10-CM: J96.91 
ICD-9-CM: 518.81  12/29/2020 Unknown Hypoxia ICD-10-CM: R09.02 
ICD-9-CM: 799.02  12/1/2020 Unknown PNA (pneumonia) ICD-10-CM: J18.9 ICD-9-CM: 819  12/1/2020 Unknown Acute possible aspiration pneumonia vs Pneumonitis - CXR clear but +cough, +hypoxia, + secretions w sxn but unsure if patient had any vomiting episode and patient improved significantly with deep suctioning and oral care - Status post PEG tube placement recently - Jevity 1.2 at 25ml/hr increase by 10 mls Q6H until @ goal of 55ml/hr hours. Free H20 flushes 120mls Q6H increase to gaol pf 160ml, with residual checks Q6H x 24H. 
- Repeat consult to Nutrition for any updated recommendations - Aspiration precautions, frequent oral care - IV ABX Zosyn + Levaquin - Supplemental O2 prn or BiPap to keep sats >92% 
- Continue Duonebs Q4H prn 
- 12/31: overall secondary to poor oral care and patient inability to handle his own secretion that most likely caused his respiratory event as currently patient is not showing evidence of residuals on tube feedings and patient has quickly resolved his extreme hypoxic respiratory failure on arrival 
-Patient overall is afebrile has good O2 saturation on room air and will transition patient from IV Zosyn and Levaquin dosing to Augmentin 875 mg / 125 mg via PEG twice daily for 5 more days. Patient will need continued oral care and suctioning per shift. 
  
Fever w Hypoxia - LTC resident - Rapid Covid swab shows as not detected 
-Patient has been afebrile and on room air continues to improve Diabetes - Accuchecks Q6H 
- SSI coverage  
  
CVA 
- Prior hx of CVA 
- Continue Plavix  
  
Hypertension - 's/60-70's 
- Hold ACEi, resume if SBP >140 
- Monitor BP 
  
 
DNR Spent 30 minutes evaluting and coordinating patient care of which >50% was spent coordinating and counseling. ' 
 
Electronically signed by Zachary Campo MD on 1/4/2021 at 4:13 PM

## 2021-01-05 NOTE — PROGRESS NOTES
Problem: Patient Education: Go to Patient Education Activity Goal: Patient/Family Education 1/5/2021 1059 by Tarik Sandoval RN Outcome: Resolved/Not Met 
1/5/2021 1059 by Tarik Sandoval RN Outcome: Progressing Towards Goal 
  
Problem: Pressure Injury - Risk of 
Goal: *Prevention of pressure injury Description: Document Steve Scale and appropriate interventions in the flowsheet. 1/5/2021 1059 by Tarik Sandoval RN Outcome: Resolved/Not Met Note: Pressure Injury Interventions: 
Sensory Interventions: Assess changes in LOC Moisture Interventions: Absorbent underpads, Minimize layers, Limit adult briefs Activity Interventions: PT/OT evaluation, Pressure redistribution bed/mattress(bed type) Mobility Interventions: PT/OT evaluation Nutrition Interventions: Document food/fluid/supplement intake Friction and Shear Interventions: Minimize layers, HOB 30 degrees or less 1/5/2021 1059 by Tarik Sandoval RN Outcome: Progressing Towards Goal 
Note: Pressure Injury Interventions: 
Sensory Interventions: Assess changes in LOC Moisture Interventions: Absorbent underpads, Minimize layers, Limit adult briefs Activity Interventions: PT/OT evaluation, Pressure redistribution bed/mattress(bed type) Mobility Interventions: PT/OT evaluation Nutrition Interventions: Document food/fluid/supplement intake Friction and Shear Interventions: Minimize layers, HOB 30 degrees or less Problem: Patient Education: Go to Patient Education Activity Goal: Patient/Family Education 1/5/2021 1059 by Tarik Sandoval RN Outcome: Resolved/Met 
1/5/2021 1059 by Tarik Sandoval RN Outcome: Progressing Towards Goal

## 2021-01-05 NOTE — PROGRESS NOTES
Physician Progress Note Blanca Metz 
CSN #:                  [id number] :                       1955 ADMIT DATE:       2020 9:48 AM 
DISCH DATE: 
RESPONDING 
PROVIDER #:        Yury Tejada MD 
 
 
 
 
QUERY TEXT: 
 
Dear Dr. Danielle Uribe: 
 
Pt admitted with PNA and has respiratory failure documented. If possible, please document in progress notes and discharge summary further specificity regarding the type and acuity of respiratory failure: The medical record reflects the following: 
Risk Factors: 60 M admitted from nursing home with PNA, sepsis, pressure ulcers Clinical Indicators: per triage, 02 sats 84% on 02 NC, ABG 7.52///23 on Bipap (PF [id number]) Treatment: CXR, ABG, Bipap weaned to 02 NC, IV Zosyn and Levaquin, DuoNeb Thank you, ADOLFO AnglinN, RN Clinical  
[phone] Options provided: 
-- Acute respiratory failure with hypoxia 
-- Other - I will add my own diagnosis -- Disagree - Not applicable / Not valid -- Disagree - Clinically unable to determine / Unknown 
-- Refer to Clinical Documentation Reviewer PROVIDER RESPONSE TEXT: 
 
This patient is in acute respiratory failure with hypoxia. Query created by: Britni Kat on 2020 2:34 PM 
 
 
QUERY TEXT: 
 
Dear Dr. Danielle Uribe: 
 
Pt admitted with PNA and has severe malnutrition documented in nutrition assessment completed . Please further specify type of malnutrition with documentation in the medical record. The medical record reflects the following: 
Risk Factors: 60 M admitted with PNA from nursing home, recent PEG placement, hx CVA with dysphagia, wounds Clinical Indicators: Nutrition assessment notes severe malnutrition, weight loss > than 5% over 1 month, severe body fat loss and muscle mass loss; BMI 25.97, total protein 7.9, albumin 1.9 Treatment: labs, Nutrition consult, tube feeding with fiushes as ordered, monitor tube feeding residuals, daily weights Thank you, ADOLFO MotaN, RN Clinical  
[phone] Options provided: 
-- Severe Malnutrition -- Severe Protein calorie malnutrition -- Other - I will add my own diagnosis -- Disagree - Not applicable / Not valid -- Disagree - Clinically unable to determine / Unknown 
-- Refer to Clinical Documentation Reviewer PROVIDER RESPONSE TEXT: 
 
This patient has severe protein calorie malnutrition.  
 
Query created by: Alberto Newell on 12/31/2020 2:39 PM 
 
 
Electronically signed by:  Clif Harvey MD 1/4/2021 10:21 PM

## 2021-01-28 PROBLEM — Z79.4 CONTROLLED TYPE 2 DIABETES MELLITUS, WITH LONG-TERM CURRENT USE OF INSULIN (HCC): Status: ACTIVE | Noted: 2021-01-01

## 2021-01-28 PROBLEM — J96.90 RESPIRATORY FAILURE (HCC): Status: ACTIVE | Noted: 2021-01-01

## 2021-01-28 PROBLEM — J18.9 PNEUMONIA: Status: ACTIVE | Noted: 2021-01-01

## 2021-01-28 PROBLEM — E87.0 HYPERNATREMIA: Status: ACTIVE | Noted: 2021-01-01

## 2021-01-28 NOTE — ED PROVIDER NOTES
EMERGENCY DEPARTMENT HISTORY AND PHYSICAL EXAM 
? 
 
Date: 1/28/2021 Patient Name: Marley Chino. History of Presenting Illness Patient presents with: 
Respiratory Distress History Provided By: EMS 
 
HPI: Marley Chino., 60 y.o. male with a past medical history significant for stroke presents to the ED with cc of respiratory distress and fever. Patient is noncommunicative due to medical condition. He is a full DNR. There are no other complaints, changes, or physical findings at this time. PCP: Christine Hahn MD 
 
Current Facility-Administered Medications: 
  sodium chloride 0.9 % bolus infusion 1,000 mL, 1,000 mL, IntraVENous, ONCE, , Chata Joe MD 
 
Current Outpatient Medications: 
  amoxicillin-clavulanate (AUGMENTIN) 875-125 mg per tablet, 1 Tab by Per G Tube route every twelve (12) hours. , Disp: 10 Tab, Rfl: 0 
  polyethylene glycol (MIRALAX) 17 gram packet, 1 Packet by Per G Tube route daily as needed for Constipation. , Disp: 30 Packet, Rfl: 0 
  docusate sodium (COLACE) 50 mg/15 mL syrp, 30 mL by Per G Tube route two (2) times a day., Disp: 900 mL, Rfl: 0 
  insulin lispro (HUMALOG) 100 unit/mL kwikpen, INITIATE CORRECTIVE INSULIN PROTOCOL (TON):RX TON Normal Sensitivity (Average weight)AC (before meals), Q6H, and Q4H CORRECTIONAL SCALE only For Blood Sugar (mg/dl) of :           140-199=2 units          200-249=3 [id number]=9 [id number]=4 [id number] or greater = Call MDGive in addition to basal medications. Do Not Hold for NPOBEDTIME CORRECTIONAL sliding scale when scheduled:200-249=2 wqsnj329-147=0 units 300-349=4 [id number] or greater = Call MDGive in addition to basal medications. Do Not Hold for NPOFast Acting - Administer Immediately - or within 15 minutes of start of meal, if mealtime coverage. mat ayers, Disp: 1 Package, Rfl: 1 
  albuterol-ipratropium (DUO-NEB) 2.5 mg-0.5 mg/3 ml nebu, 3 mL by Nebulization route four (4) times daily. , Disp: 30 Nebule, Rfl: 0 
  clopidogreL (PLAVIX) 75 mg tab, 1 Tab by PEG Tube route daily. , Disp: 30 Tab, Rfl: 0 
  potassium chloride (KLOR-CON) 20 mEq pack, 1 Packet by PEG Tube route daily. , Disp: 30 Packet, Rfl: 0 Past History Past Medical History: 
Past Medical History: 
No date: CVA (cerebral vascular accident) ([facility] [address] No date: Diabetes ([facility] [address] No date: Gastrointestinal disorder Comment:  Feeding Tube No date: Glaucoma No date: Hypertension Past Surgical History: 
Past Surgical History: 
No date: HX CATARACT REMOVAL Family History: No family history on file. Social History: 
Social History Tobacco Use Smoking status: Former Smoker Smokeless tobacco: Never Used Alcohol use: Not Currently Drug use: Not on file Allergies: 
No Known Allergies Review of Systems @[facility]@ Physical Exam 
@[facility]@ Diagnostic Study Results Labs - No results found for this or any previous visit (from the past 12 hour(s)). Radiologic Studies -  
XR CHEST PORT    (Results Pending) CT Results  (Last 48 hours) None CXR Results  (Last 48 hours) None Medical Decision Making and ED Course I am the first provider for this patient. I reviewed the vital signs, available nursing notes, past medical history, past surgical history, family history and social history. Vital Signs-Reviewed the patient's vital signs. Empty flowsheet group. EKG interpretation: (Preliminary) Rhythm: sinus tachycardia; and regular . Rate (approx.): 108; Axis: normal; AL interval: normal; QRS interval: normal ; ST/T wave: normal; Other findings: borderline ekg. Records Reviewed: Nursing Notes Provider Notes (Medical Decision Making):  
Patient presents with fever, hypotension, respiratory distress. Will start sepsis work-up The patient presents with differential diagnosis of sepsis, aspiration pneumonia, UTI, Covid ED Course:  
Initial assessment performed.  The patients presenting problems have been discussed, and they are in agreement with the care plan formulated and outlined with them. I have encouraged them to ask questions as they arise throughout their visit. CRITICAL CARE NOTE : 
1:42 AM 
Amount of Critical Care Time: ___30_(minutes)__ IMPENDING DETERIORATION -Respiratory, Cardiovascular and Metabolic ASSOCIATED RISK FACTORS - Hypotension, Hypoxia, Metabolic changes and Vascular Compromise MANAGEMENT- Bedside Assessment and Supervision of Care INTERPRETATION -  Xrays, ECG and Blood Pressure INTERVENTIONS - hemodynamic mngmt and Metobolic interventions CASE REVIEW - Hospitalist 
TREATMENT RESPONSE -Stable PERFORMED BY - Physician NOTES   : 
I have spent critical care time involved in lab review, consultations with specialist, family decision- making, bedside attention and documentation. This time excludes time spent in any separate billed procedures. During this entire length of time I was immediately available to the patient . Floyd Schaumann, MD 
 
 
 
Disposition Admitted Diagnosis Clinical Impression: Pneumonia, sepsis, dehydration Floyd Schaumann, MD 
 
Please note that this dictation was completed with [employer], the computer voice recognition software. Quite often unanticipated grammatical, syntax, homophones, and other interpretive errors are inadvertently transcribed by the computer software. Please disregard these errors. Please excuse any errors that have escaped final proofreading. Thank you. 
 
? Past Medical History:  
Diagnosis Date  CVA (cerebral vascular accident) ([facility] [address]  Diabetes ([facility] [address]  Gastrointestinal disorder Feeding Tube  Glaucoma  Hypertension Past Surgical History:  
Procedure Laterality Date  HX CATARACT REMOVAL No family history on file. Social History Socioeconomic History  Marital status: SINGLE Spouse name: Not on file  Number of children: Not on file  Years of education: Not on file  Highest education level: Not on file Occupational History  Not on file Social Needs  Financial resource strain: Not on file  Food insecurity Worry: Not on file Inability: Not on file  Transportation needs Medical: Not on file Non-medical: Not on file Tobacco Use  Smoking status: Former Smoker  Smokeless tobacco: Never Used Substance and Sexual Activity  Alcohol use: Not Currently  Drug use: Not on file  Sexual activity: Not on file Lifestyle  Physical activity Days per week: Not on file Minutes per session: Not on file  Stress: Not on file Relationships  Social connections Talks on phone: Not on file Gets together: Not on file Attends Yarsani service: Not on file Active member of club or organization: Not on file Attends meetings of clubs or organizations: Not on file Relationship status: Not on file  Intimate partner violence Fear of current or ex partner: Not on file Emotionally abused: Not on file Physically abused: Not on file Forced sexual activity: Not on file Other Topics Concern  Not on file Social History Narrative  Not on file ALLERGIES: Patient has no known allergies. Review of Systems Unable to perform ROS: Patient nonverbal  
 
 
Vitals:  
 01/28/21 0107 01/28/21 0113 BP:  90/60 Pulse: (!) 107 (!) 107 Resp: (!) 37 (!) 42 Temp:  (!) 100.7 °F (38.2 °C) SpO2:  92% Weight:  52.2 kg (115 lb) Height:  5' 8\" (1.727 m) Physical Exam 
Vitals signs and nursing note reviewed. Constitutional:   
   General: He is in acute distress. HENT:  
   Head: Normocephalic and atraumatic. Nose: Nose normal.  
   Mouth/Throat:  
   Mouth: Mucous membranes are moist.  
Eyes:  
   Extraocular Movements: Extraocular movements intact. Pupils: Pupils are equal, round, and reactive to light. Neck: Musculoskeletal: Neck rigidity present. Cardiovascular:  
   Rate and Rhythm: Tachycardia present. Pulses: Normal pulses. Heart sounds: Normal heart sounds. Pulmonary:  
   Effort: Respiratory distress present. Breath sounds: Rhonchi present. Abdominal:  
   General: Abdomen is flat. Palpations: Abdomen is soft. Musculoskeletal:  
   Comments: Contractures Neurological:  
   Mental Status: He is unresponsive. MDM Number of Diagnoses or Management Options Amount and/or Complexity of Data Reviewed Clinical lab tests: reviewed Tests in the radiology section of CPT®: reviewed Risk of Complications, Morbidity, and/or Mortality Presenting problems: high Diagnostic procedures: high Management options: high Patient Progress Patient progress: stable Procedures

## 2021-01-28 NOTE — ED NOTES
Report to JACQUELINE Baltazar, all questions and concerns addressed, care relinquished at this time.

## 2021-01-28 NOTE — ED NOTES
Pt straight cath for urine, verbal order from MD Anderson. 400ml of sepideh yellow urine obtained and sent to lab. Pt tolerated well.

## 2021-01-28 NOTE — PROGRESS NOTES
Patient seen and examined. H&P reviewed. Acute respiratory failure 
-Acute hypoxic respiratory failure due to pneumonia 
-Currently requiring 15 L of oxygen 
-Check D-dimer Sepsis 
-Patient with fever, tachycardia and leukocytosis meeting sepsis criteria 
-Sepsis due to pneumonia 
-Continue broad-spectrum antibiotics Pneumonia 
-Chest x-ray with left basilar opacity 
-Initial rapid Covid test was negative, there is a 30% chance of false negative 
-Patient is a high risk due to being in the nursing home 
-Therefore we ordered COVID-19 PCR test 
-Continue droplet isolation, follow blood cultures 
-Currently on Vanco and Zosyn Hypernatremia 
-Change IV fluid to D5W 
-Monitor daily sodium Decubitus ulcer 
-Daily wound care Diabetes 
-At baseline controlled with hemoglobin A1c of 5.9 
-Continue insulin sliding scale coverage and monitor blood sugars History of CVA 
-Unclear patient's baseline 
-Likely baseline is chronic debilitated state, currently with PEG tube feeding 
-Continue Plavix

## 2021-01-28 NOTE — PROGRESS NOTES
Comprehensive Nutrition Assessment Type and Reason for Visit: Positive nutrition screen Nutrition Recommendations/Plan:  
 
TF Rec': Cont. Jevity 1.2 @ 55ml w/ water flushes [id number] Prosource x2/day (@ AM & H.S. med pass) w/ 30ml water flushe before and after Requested an updated (ABW) weight in system w/ known weight source to better assess nutritional status x1/day. Will continue to follow as a high level of nutrition care Nutrition Assessment:  60year old male bedbound with HTN, DM, CVA, recent admit for pneumonia, likely aspiration here with fever and hypoxia from the nursing home. covid rapid test was negative. retesting coivd test is pending. Pt was recently admitted on (12/3) for peg tube placement and again on (12/29) was noted to have poor oral care in the ED and unitentionally wt. loss w/ a wt. change of 7/6 x 1m. Admitting Wt. (12/29) was 119lbs( bedscale) and last known wt in the system was on (1/4) @ 126lbs (bedscale). Current wt. in the system is 115lbs, however, weight source is unknown. TF rec's from 1/4 as the following: continous Jevity 1.2 @ 55ml w/ water flushes [id number] & prosource x2/day , noted tolerated w/o no complcations and proving 1,704kcal/day. ??? Pt's wt at 115lb as an unknown unitentionally wt. loss vs. enetering errror. Nutrition related lab values as the following: hbg (7.3), HCT (22.3), Na (153), glucose (206), BUN (48), Pro (9.2), alb (1.9). IV solution: cont. sodium chloride @ 75ml/hr. Meds: potassium bicarb-citric acid (20meq) & vancomycin. Malnutrition Assessment: 
Malnutrition Status:  Insufficient data(unable to perform NFPE due to precaution of covid 19) Estimated Daily Nutrient Needs: 
Energy (kcal): 1,600kcals ( 1,161. 2BMR 1.4); Weight Used for Energy Requirements: Usual(57kg (wt. from 1/4)) Protein (g): 80g (1.4/kg); Weight Used for Protein Requirements: Usual(57kg (wt. from 1/4)) Fluid (ml/day): 1,710ml (30ml/kg);  Method Used for Fluid Requirements: 1 ml/kcal 
 
 
Nutrition Related Findings:  cogition: alert, noncommunicative. Digestive system: abdomen: soft, non-tender, no reported n/c, v/d, oral dysphagia. extremities/muscle/bone: bedbound, fat and muscle wasting observed (12/29). Skin: multiple decubiti Wounds:   
Multiple, Stage III, Stage I(stage 3- R. ankle, R. heel, L. foot, L. little toe. Stage 1- R. hip) Current Nutrition Therapies: 
Current Tube Feeding (TF) Orders: · Feeding Route: PEG 
· Formula: Jevity 1.2 · Schedule:Continuous · Regimen: 55ml · Additives/Modulars: Protein, Wound healing · Water Flushes: [id number] · Goal TF & Flush Orders Provides: Goal rate: provides total 1,584kcals (w/ prosource 1,704kcals), protien 72g (w/ prosource 102g) , Ml w/ water flushes ( 1,709ml). Anthropometric Measures: 
· Height:  5' 8\" (172.7 cm) · Admission Body Wt:  115 lb(unknown weight source) · Usual Body Wt:  57.2 kg (126 lb)(x3wk) · Ideal Body Wt:  154 lbs:   
 
Nutrition Diagnosis: · Swallowing difficulty related to other (specify)(unable to safety consume food via mouth) as evidenced by nutrition support-enteral nutrition Nutrition Interventions:  
Food and/or Nutrient Delivery: Start tube feeding, Start oral nutrition supplement Nutrition Education and Counseling: No recommendations at this time Coordination of Nutrition Care: Continue to monitor while inpatient, Interdisciplinary rounds Goals: 
tolerable TF- residuals <200ml, obtain ABW x1day Nutrition Monitoring and Evaluation:  
Behavioral-Environmental Outcomes: None identified Food/Nutrient Intake Outcomes: Enteral nutrition intake/tolerance Physical Signs/Symptoms Outcomes: Weight, Biochemical data Discharge Planning:   
No discharge needs at this time

## 2021-01-28 NOTE — ED NOTES
Pt unresponsive, sinus tach on the monitor, tachypneic on [email or web address], hypotensive; MD Anderson at bedside to assess 20G PIV to R forearm, unable to obtain adequate blood flow for labs with x3 attempts.

## 2021-01-28 NOTE — ED NOTES
Anaid Marin made of aware of clarification needed for Glucerna feed order, Awaiting order clarification at this time

## 2021-01-28 NOTE — H&P
GENERAL GENERIC H&P/CONSULT 
 
 
60year old male with HTN, DM, CVA, recent admit for pneumonia, likely aspiration here with fever and hypoxia from the nursing home. He is bedbound at baseline and nursing home noted sats of 74% tonight and sent him for evaluation. The patient is unable to provide history. He was noted to have a small LLL infiltrate at the base and a WBC of 19K. His initial BP was in the 90s and he received 1L fluid. His lactic acid is 2.5. He was also noted to be hypernatremic and dry with an NELSON. COVID test is negative Past Medical History:  
Diagnosis Date  CVA (cerebral vascular accident) ([facility] [facility]  Diabetes ([facility] [facility]  Gastrointestinal disorder Feeding Tube  Glaucoma  Hypertension Past Surgical History:  
Procedure Laterality Date  HX CATARACT REMOVAL Prior to Admission medications Medication Sig Start Date End Date Taking? Authorizing Provider  
polyethylene glycol (MIRALAX) 17 gram packet 1 Packet by Per G Tube route daily as needed for Constipation. 1/5/21   Abi David MD  
docusate sodium (COLACE) 50 mg/15 mL syrp 30 mL by Per G Tube route two (2) times a day. 1/5/21   Abi David MD  
insulin lispro (HUMALOG) 100 unit/mL kwikpen INITIATE CORRECTIVE INSULIN PROTOCOL (TON): 
RX TON Normal Sensitivity (Average weight) AC (before meals), Q6H, and Q4H CORRECTIONAL SCALE only For Blood Sugar (mg/dl) of :            
140-199=2 units           
200-249=3 units 250-299=5 units 300-349=7 units 350 or greater = Call MD 
Give in addition to basal medications. Do Not Hold for NPO BEDTIME CORRECTIONAL sliding scale when scheduled: 
200-249=2 units 250-299=3 units 300-349=4 units 350 or greater = Call MD 
Give in addition to basal medications. Do Not Hold for NPO Fast Acting - Administer Immediately - or within 15 minutes of start of meal, if mealtime coverage.  
 
mat ayers 1/5/21   Rachel Van MD  
albuterol-ipratropium (DUO-NEB) 2.5 mg-0.5 mg/3 ml nebu 3 mL by Nebulization route four (4) times daily. 12/5/20   Anabella Manzanares MD  
clopidogreL (PLAVIX) 75 mg tab 1 Tab by PEG Tube route daily. 12/5/20   Anabella Manzanares MD  
potassium chloride (KLOR-CON) 20 mEq pack 1 Packet by PEG Tube route daily. 12/5/20   Anabella Manzanares MD  
 
No Known Allergies Social History Tobacco Use  Smoking status: Former Smoker  Smokeless tobacco: Never Used Substance Use Topics  Alcohol use: Not Currently Unable to obtain family history Review of Systems Unable to perform ROS: Dementia All other systems reviewed and are negative. Objective: 
 
01/27 1901 - 01/28 0700 In: 1000 [I.V.:1000] Out: - No intake/output data recorded. Patient Vitals for the past 8 hrs: 
 BP Temp Pulse Resp SpO2 Height Weight  
01/28/21 0500 93/64  89 27 95 %    
01/28/21 0400 96/61  87 30 95 %    
01/28/21 0300 106/60  87 (!) 35 96 %    
01/28/21 0200 104/67 100.2 °F (37.9 °C) (!) 103 (!) 40 92 %    
01/28/21 0113 90/60 (!) 100.7 °F (38.2 °C) (!) 107 (!) 42 92 % 5' 8\" (1.727 m) 52.2 kg (115 lb)  
01/28/21 0107   (!) 107 (!) 37    Physical Exam 
Vitals signs and nursing note reviewed. Constitutional:   
   Comments: Emaciated and contracted elderly male with nonrebreather mask in place HENT:  
   Head: Normocephalic and atraumatic. Mouth/Throat:  
   Mouth: Mucous membranes are dry. Neck: Musculoskeletal: Normal range of motion. Cardiovascular:  
   Rate and Rhythm: Regular rhythm. Tachycardia present. Pulses: Normal pulses. Pulmonary:  
   Effort: No respiratory distress. Breath sounds: Wheezing present. Abdominal:  
   General: Abdomen is flat. There is no distension. Tenderness: There is no abdominal tenderness. Comments: PEG site clean Musculoskeletal:  
   Right lower leg: No edema. Left lower leg: No edema.   
   Comments: Lower extremities very contracted Skin: 
   General: Skin is warm. Comments: There are multiple decubiti Stage 3 right ankle, right heel Stage 3 left foot, left little toe Right hip stage 1 Neurological:  
   Comments: Occasionally opens eyes, did withdraw to pain, otherwise unresponsive. Sister states that he has had progressive decline since last admit 12/29 Psychiatric:  
   Comments: Cannot assess Labs:   
Recent Results (from the past 24 hour(s)) CBC WITH AUTOMATED DIFF Collection Time: 01/28/21  3:44 AM  
Result Value Ref Range WBC 19.6 (H) 4.4 - 11.3 K/uL  
 RBC 2.54 M/uL HGB 7.3 (L) 13.0 - 16.0 g/dL HCT 22.3 (L) 41 - 53 % MCV 87.6 80 - 100 FL  
 MCH 28.6 (L) 31 - 34 PG  
 MCHC 32.6 31.0 - 36.0 g/dL  
 RDW 12.8 11.5 - 14.5 % PLATELET 681 K/uL MPV 9.7 6.5 - 11.5 FL  
 NEUTROPHILS 80 (H) 42 - 75 % LYMPHOCYTES 13 (L) 20.5 - 51.1 % MONOCYTES 5 3.1 - 13.9 % EOSINOPHILS 1 0.9 - 2.9 % BASOPHILS 1 0.0 - 2.5 % ABS. NEUTROPHILS 15.9 (H) 1.8 - 7.7 K/UL  
 ABS. LYMPHOCYTES 2.5 1.0 - 4.8 K/UL  
 ABS. MONOCYTES 0.9 (H) 0.0 - 0.8 K/UL  
 ABS. EOSINOPHILS 0.2 0.0 - 0.7 K/UL  
 ABS. BASOPHILS 0.1 0.0 - 0.2 K/UL METABOLIC PANEL, COMPREHENSIVE Collection Time: 01/28/21  3:44 AM  
Result Value Ref Range Sodium 153 (H) 136 - 145 mmol/L Potassium 4.3 3.5 - 5.1 mmol/L Chloride 115 (H) 97 - 108 mmol/L  
 CO2 28 21 - 32 mmol/L Anion gap 10 5 - 15 mmol/L Glucose 206 (H) 65 - 100 mg/dL BUN 48 (H) 6 - 20 mg/dL Creatinine 1.20 0.70 - 1.30 mg/dL BUN/Creatinine ratio 40 (H) 12 - 20 GFR est AA >60 >60 ml/min/1.73m2 GFR est non-AA >60 >60 ml/min/1.73m2 Calcium 8.8 8.5 - 10.1 mg/dL Bilirubin, total 0.3 0.2 - 1.0 mg/dL AST (SGOT) 29 15 - 37 U/L  
 ALT (SGPT) 30 12 - 78 U/L Alk. phosphatase 112 45 - 117 U/L Protein, total 9.2 (H) 6.4 - 8.2 g/dL Albumin 1.9 (L) 3.5 - 5.0 g/dL Globulin 7.3 (H) 2.0 - 4.0 g/dL A-G Ratio 0.3 (L) 1.1 - 2.2 LACTIC ACID  
 Collection Time: 01/28/21  3:44 AM  
Result Value Ref Range Lactic acid 2.5 (HH) 0.4 - 2.0 mmol/L  
SARS-COV-2 Collection Time: 01/28/21  4:53 AM  
Result Value Ref Range SARS-CoV-2 Nasopharyngeal    
COVID-19 RAPID TEST Collection Time: 01/28/21  4:53 AM  
Result Value Ref Range Specimen source Nasopharyngeal    
 COVID-19 rapid test Not Detected Not Detected URINALYSIS W/ RFLX MICROSCOPIC Collection Time: 01/28/21  5:05 AM  
Result Value Ref Range Color Yellow/Straw Appearance Clear Clear Specific gravity 1.010 1.003 - 1.030    
 pH (UA) 6.0 5.0 - 8.0 Protein Trace (A) Negative mg/dL Glucose Negative Negative mg/dL Ketone Negative Negative mg/dL Bilirubin Negative Negative Blood Negative Negative Urobilinogen 1.0 0.2 - 1.0 EU/dL Nitrites Negative Negative Leukocyte Esterase Negative Negative URINE MICROSCOPIC Collection Time: 01/28/21  5:05 AM  
Result Value Ref Range WBC 0-4 0 - 5 /hpf  
 RBC 0-5 0 - 3 /hpf Bacteria 1+ (A) Negative /hpf Chest X-Ray: infiltrate(s): lower lobe(s) left Assessment: 
Active Problems: 
  Respiratory failure ([address] (1/28/2021) Pneumonia (1/28/2021) NELSON Hypernatremia Plan: 
 
Pneumonia with respiratory failure Ordered 2nd liter NS Ordered Vancomycin and Zosyn Oxygen at 15 L May need BiPAP if worse but sats ok currently Hypernatremia with dehydration and NELSON Increased free water flush to 200cc q4h 
0.45% NS ordered Avoid nephrotoxins Pharmacy is dosing Vancomycin Multiple decubiti Wound healing Diabetes Ordered glucerna Sliding scale Signed: 
Horacio Granados MD 1/28/2021

## 2021-01-28 NOTE — ED NOTES
Pt moved into hospital bed for comfort, brief checked for incontinence. Pt repositioned. Pt noted to have wound to right inner ankle and left pinky toe. Left pinky toe noted to have large amount of dark brown drainage with foul odor. Both wounds were dressed and dated for 1/26/2021. pt repositioned off sacrum. Wounds cleaned and covered

## 2021-01-28 NOTE — ED NOTES
Pt cleaned of incontinence small bm noted. PT suctioned due to large amount of sputumn noted. Mouth care provided

## 2021-01-28 NOTE — ED TRIAGE NOTES
Pt sent from [facility] for \"possible pneumonia\" Found by staff with room air sats of 74% DNR status

## 2021-01-28 NOTE — PROGRESS NOTES
[facility] Dosing Services: Vancomycin Consult for Vancomycin Dosing by Pharmacy by Dr. Florentin Virgen Indication:CAP Day of Therapy 1 Previous Regimen Last Level Other Current Antibiotics Zosyn 3.375g IV q8h Significant Cultures Serum Creatinine Lab Results Component Value Date/Time Creatinine 1.20 01/28/2021 03:44 AM  
   
Creatinine Clearance Estimated Creatinine Clearance: 45.9 mL/min (based on SCr of 1.2 mg/dL). BUN Lab Results Component Value Date/Time BUN 48 (H) 01/28/2021 03:44 AM  
   
WBC Lab Results Component Value Date/Time WBC 19.6 (H) 01/28/2021 03:44 AM  
   
H/H Lab Results Component Value Date/Time HGB 7.3 (L) 01/28/2021 03:44 AM  
  
Platelets Lab Results Component Value Date/Time PLATELET 829 23/44/4813 03:44 AM  
  
Temp Temp: [unfilled] Dose administration notes: Will start Vancomycin 1g IV daily and continue to monitor. Plan for level: 1/31/21 Adjustments:  Per protocol Plan:  Continue to monitor daily

## 2021-01-29 NOTE — PROGRESS NOTES
Patient given bath and placed in body bag with appropriate labels. Iv removed. notified security that patient is ready for the morgue

## 2021-01-29 NOTE — PROGRESS NOTES
Visit attempted for patient in 2 acute care unit for inpatient death. Patient was alone during the visit. I collaborated with nurse who stated that patient's children were with patient in the room at the time of death. The children were no longer in the unit area. I provided staff support along with silent prayer for patient's family and staff. Chaplains will follow up if further referrals are requested.  
 
Chaplain Omkar Thompson M.Div.

## 2021-01-29 NOTE — ED NOTES
Report received from JACQUELINE Espinoza. Patient waiting to go to bed assignment 216. Nurse is currently unavailable for report. Brief checked. Patient suctioned. Patient turned. Jevity 1.2 running at 55ml/hr. IV site infusing d5% at 75ml/hr.

## 2021-01-29 NOTE — PROGRESS NOTES
Spoke with [facility]. Will be here to get patient around 5pm from [address] [place] [address]

## 2021-01-29 NOTE — DISCHARGE SUMMARY
[address] [facility] Adult  Hospitalist Group Death Discharge Summary PATIENT ID: Deonte Weeks MRN: [id number] YOB: 1955 DATE OF ADMISSION: 2021  1:11 AM   
PRIMARY CARE PROVIDER: Cresencio Hebert MD  
ATTENDING PHYSICIAN: Angelia Kraft MD 
CONSULTATIONS:  
None PROCEDURES/SURGERIES:  
* No surgery found * REASON FOR ADMISSION: Pneumonia HOSPITAL PROBLEM LIST: 
Patient Active Problem List  
Diagnosis Code  Hypoxia R09.02  
 PNA (pneumonia) J18.9  NELSON (acute kidney injury) ([facility] N17.9  Sepsis ([facility] [address] A41.9  Respiratory failure, unspecified with hypoxia ([facility]) J96.91  
 Hypoxic R09.02  
 Dehydration E86.0  Controlled type 2 diabetes mellitus, with long-term current use of insulin ([facility]) E11.9, Z79.4  Respiratory failure ([facility] [address] J96.90  Pneumonia J18.9  Hypernatremia E87.0  
 
 
DATE AND TIME OF DEATH: 2021 at 1355 CODE STATUS AT DISCHARGE: 
 Full Code DNR Partial  
 Comfort Care DISCHARGE DIAGNOSES:  
Acute respiratory failure Sepsis Pneumonia Hypernatremia Decubitus ulcer Diabetes CVA Leg wound Brief HPI and Hospital Course:   
 
Patient was admitted for hypoxic respiratory failure due to pneumonia. Covid was tested negative x2. Patient requiring 15 L of oxygen. Patient treated with vancomycin and Zosyn. Today patient's condition is getting worse and more tachypneic and labored. I have discussed with patient's daughter regarding his declining condition and the family decided for comfort care. Patient was given morphine for comfort and he has . On exam, no heart sounds or breath sounds were noted after 1 minute of auscultation. Pupils were fixed and dilated without pupillary light reflex. Patient was pronounced dead on 2021  at [id number]. Cause of Death: 
Immediate: Pneumonia Events related to death: 
Was code called: NO 
 notified: NO Family notified: Bk Arredondo Autopsy requested: NO 
Death certificate completed: NO 
Code Status Prior to Death: DNR  
 
PHYSICAL EXAMINATION AT DISCHARGE: 
GEN: No response to verbal or tactile stimuli HEENT: Pupils fixed, dilated CV: No cardiac activity or heart sounds appreciated. No carotid pulse. PULM: No respiratory effort or spontaneous respirations. Ext: No peripheral pulses. Signed: Onelia Aremnta MD 
Date of Service:  1/29/2021 
2:32 PM

## 2021-01-29 NOTE — ED NOTES
Spoke with Christy Ureña NP regarding patient becoming tachycardic. HR elevated from 84 to 102. She will come see patient.

## 2021-01-29 NOTE — PROGRESS NOTES
Progress Note Patient: Kasia Winter. MRN: [id number]  SSN: [id number] YOB: 1955  Age: 60 y.o. Sex: male Admit Date: 1/28/2021 LOS: 1 day Subjective:  
 
Patient presents with pneumonia and respiratory failure. Patient's breathing is labored today. Not responding. Objective:  
 
Vitals:  
 01/28/21 2225 01/29/21 0018 01/29/21 0219 01/29/21 0400 BP:  104/67 (!) 101/58 Pulse:  (!) 107 (!) 111 (!) 119 Resp:  28 28 Temp:  99.6 °F (37.6 °C) (!) 101.3 °F (38.5 °C) SpO2: 97% 99% 97% Weight:      
Height:      
  
 
Intake and Output: 
Current Shift: No intake/output data recorded. Last three shifts: 01/27 1901 - 01/29 0700 In: 1000 [I.V.:1000] Out: - Physical Exam:  
GENERAL: Patient not responisve THROAT & NECK: normal and no erythema or exudates noted. LUNG: Crackles bilaterally HEART: regular rate and rhythm, S1, S2 normal, no murmur, click, rub or gallop ABDOMEN: soft, non-tender. Bowel sounds normal. No masses,  no organomegaly EXTREMITIES:  extremities normal, atraumatic, no cyanosis or edema SKIN: Ulcers in the feet NEUROLOGIC: Not responsive PSYCHIATRIC: non focal 
 
Lab/Data Review: All lab results for the last 24 hours reviewed. Recent Results (from the past 24 hour(s)) SARS-COV-2 Collection Time: 01/28/21 10:05 AM  
Result Value Ref Range SARS-CoV-2 Please find results under separate order NOVEL CORONAVIRUS (COVID-19) Collection Time: 01/28/21 10:05 AM  
Result Value Ref Range SARS-CoV-2 Not Detected Not Detected GLUCOSE, POC Collection Time: 01/28/21 11:57 AM  
Result Value Ref Range Glucose (POC) 201 (H) 65 - 100 mg/dL Performed by Lianna Ortiz Collection Time: 01/28/21  2:33 PM  
Result Value Ref Range Crossmatch Expiration 01/31/2021,2359 ABO/Rh(D) O Positive Antibody screen Negative METABOLIC PANEL, BASIC  Collection Time: 01/28/21  2:33 PM  
Result Value Ref Range Sodium 155 (H) 136 - 145 mmol/L Potassium 3.5 3.5 - 5.1 mmol/L Chloride 120 (H) 97 - 108 mmol/L  
 CO2 25 21 - 32 mmol/L Anion gap 10 5 - 15 mmol/L Glucose 137 (H) 65 - 100 mg/dL BUN 42 (H) 6 - 20 mg/dL Creatinine 0.80 0.70 - 1.30 mg/dL BUN/Creatinine ratio 53 (H) 12 - 20 GFR est AA >60 >60 ml/min/1.73m2 GFR est non-AA >60 >60 ml/min/1.73m2 Calcium 8.4 (L) 8.5 - 10.1 mg/dL CBC W/O DIFF Collection Time: 01/28/21  2:33 PM  
Result Value Ref Range WBC 14.4 (H) 4.4 - 11.3 K/uL  
 RBC 3.09 (L) 4.50 - 5.90 M/uL HGB 8.6 (L) 13.5 - 17.5 g/dL HCT 27.9 (L) 41 - 53 % MCV 90.4 80 - 100 FL  
 MCH 27.8 (L) 31 - 34 PG  
 MCHC 30.7 (L) 31.0 - 36.0 g/dL  
 RDW 16.2 (H) 11.5 - 14.5 % PLATELET 276 K/uL MPV 9.9 6.5 - 11.5 FL  
 NRBC 0.1  WBC ABSOLUTE NRBC 0.01 K/uL CULTURE, WOUND W GRAM STAIN Collection Time: 01/28/21  3:02 PM  
 Specimen: Wound Result Value Ref Range Special Requests: No Special Requests GRAM STAIN No wbc's seen GRAM STAIN 2+ Gram Positive Cocci GRAM STAIN Occasional Gram variable rods Culture result: PENDING   
GLUCOSE, POC Collection Time: 01/28/21  5:24 PM  
Result Value Ref Range Glucose (POC) 156 (H) 65 - 100 mg/dL Performed by Jaymie Mart, POC Collection Time: 01/29/21  2:17 AM  
Result Value Ref Range Glucose (POC) 271 (H) 65 - 100 mg/dL Performed by Wilmer Field, POC Collection Time: 01/29/21  6:07 AM  
Result Value Ref Range Glucose (POC) 243 (H) 65 - 100 mg/dL Performed by [facility]: No results found. Assessment and Plan:  
 
Acute respiratory failure 
-Acute hypoxic respiratory failure due to pneumonia 
-Currently requiring 15 L of oxygen 
-Clinically worse today 
-Tried calling patient's sister, listed as family contact in the chart.  Could not get in touch, and also could not leave voicemail as the mailbox is full 
  Sepsis 
-Patient with fever, tachycardia and leukocytosis meeting sepsis criteria 
-Sepsis due to pneumonia 
-Continue broad-spectrum antibiotics 
  
Pneumonia 
-Chest x-ray with left basilar opacity 
-Initial rapid Covid test was negative, there is a 30% chance of false negative 
-Patient is a high risk due to being in the nursing home 
-Therefore we ordered COVID-19 PCR test 
-Continue droplet isolation, follow blood cultures 
-Currently on Vanco and Zosyn 
  
Hypernatremia 
-Change IV fluid to D5W 
-Monitor daily sodium 
  
Decubitus ulcer 
-Daily wound care 
  
Diabetes 
-At baseline controlled with hemoglobin A1c of 5.9 
-Continue insulin sliding scale coverage and monitor blood sugars 
  
History of CVA 
-Unclear patient's baseline 
-Likely baseline is chronic debilitated state, currently with PEG tube feeding 
-Continue Plavix Wound bilateral feet 
-Follow wound culture 
-Continue current antibiotics Anticipated disposition is likely more than 48 hour pending progress. Signed By: Asaf Amaya MD   
 January 29, 2021

## 2021-01-29 NOTE — PROGRESS NOTES
Visit attempted for patient in 2 acute care [address] during rounds. Patient was alone during the visit. Patient seemed to be resting or sleeping and did not respond to my verbal greeting. I provided silent support and prayer inside patient room. Chaplains will follow up if further referrals are requested.  
 
Chaplain Chad Herrera M.Div.

## 2021-01-29 NOTE — ED NOTES
Called hospitalist due to patient developing a fever. Dr. Renae Pugh gave orders for tylenol suppositories. Will administer medications once verified by pharmacy.

## 2021-01-29 NOTE — PROGRESS NOTES
Spoke with Martin Mas at [facility]. Stated patient was not a candidate fro donation and may release body to  home.

## 2021-01-29 NOTE — PROGRESS NOTES
Care Management Interventions Mode of Transport at Discharge: [facility] Current Support Network: [address] Discharge Location Discharge Placement: [address] [place] [address]

## 2021-01-29 NOTE — PROGRESS NOTES
Discussed with patient's daughter, Pilar Mcguire, over the phone. I have made daughter aware of patient's declining condition including more respiratory distress and requiring high flow oxygen. Patient is a DNR and if patient continues to be in distress, the next step will be BiPAP which will not necessarily guarantee improvement in his breathing. Discussed with patient's daughter option of comfort care, which will be to given morphine to help with respiratory distress and pain but that will will not necessarily help with the oxygenation. Given overall patient's quality of life, daughter has made the decision for comfort care status.

## 2021-01-29 NOTE — ED NOTES
Called respiratory requesting STAT neb treatment at this time, per request from [facility] PIYUSH ROWLAND NP. He reports he is on his way.

## 2021-01-29 NOTE — PROGRESS NOTES
Tried to reach patient's daughter again at both telephone numbers [phone] as well as [phone]. Cannot get in touch.

## 2021-01-29 NOTE — PROGRESS NOTES
Discussed patient's plan with [facility] and Dr. Marcus Crisostomo. MD feels that transport back to facility is inappropriate at this time.

## 2021-01-29 NOTE — ED NOTES
Patient brief checked. Saturated with urine and a formed, yellow bowel movement. Patient cleaned, alli care performed, small pressure wound noted to scrotum, patient given tylenol suppository. Called and updated King Oscar in acute care of patient status updates.

## 2021-01-29 NOTE — ED NOTES
Called Acute care to give report, nurse is unavailable at this time. Nurse will call back when ready to receive report.

## 2021-01-29 NOTE — ED NOTES
Neb treatment administered by this RN at this time due to patient's deteriorating respiratory status and still awaiting arrival of respiratory staff.

## 2021-01-29 NOTE — PROGRESS NOTES
Physician Progress Note Akin Graf 
CSN #:                  [id number] :                       1955 ADMIT DATE:       2021 1:11 AM 
[address] [place] DATE: 
RESPONDING 
PROVIDER #:        Rebel Terrazas MD 
 
 
 
 
QUERY TEXT: 
 
Dear Dr. Rosa Client: 
 
Pt admitted with sepsis, PNA, respiratory failure. Pt noted to have hx CVA with dysphagia and dependent on PEG tube feeding with recent admission for aspiration PNA per H&P. If possible, please document in the progress notes and discharge summary if you are evaluating and/or treating any of the following: The medical record reflects the following: 
Risk Factors: 60 M admitted from nursing home with sepsis, PNA, respiratory failure; PMH CVA with dysphagia, PEG tube, multiple wounds Clinical Indicators: CXR notes \"Left base opacity, consistent with pneumonia/atelectasis/infarct and/or effusion. \"; WBC 19.6. Pamalee Bucker Pamalee Bucker 14.4; respiratory failure now requiring NRB 15lpm; recent admission for aspiration PNA; nursing home patient Treatment: labs, CXR, Albuterol nebs, IV Zosyn, IV Vancomycin Thank you, Luther Kennedy, ADOLFON, RN Clinical  
[facility] [phone] Options provided: 
-- Gram negative pneumonia 
-- Gram positive pneumonia 
-- MRSA pneumonia 
-- MSSA pneumonia -- Bacterial pneumonia -- Viral pneumonia 
-- Aspiration pneumonia 
-- Other - I will add my own diagnosis -- Disagree - Not applicable / Not valid -- Disagree - Clinically unable to determine / Unknown 
-- Refer to Clinical Documentation Reviewer PROVIDER RESPONSE TEXT: 
 
This patient has bacterial pneumonia.  
 
Query created by: Reina Ortiz on 2021 9:16 AM 
 
 
Electronically signed by:  Rebel Terrazas MD 2021 2:52 PM

## 2021-01-29 NOTE — PROGRESS NOTES
Readmission Assessment Number of days since last admission?: 8-30 days Previous disposition: [address] [place] [address] Who is being interviewed?: (LTC) What was the patient's/caregiver's perception as to why they think they needed to return back to the hospital?: Other (Comment) Did you visit your Primary Care Physician after you left the hospital, before you returned this time?: (LTC) Did you see a specialist, such as Cardiac, Pulmonary, Orthopedic Physician, etc. after you left the hospital?: No 
Who advised the patient to return to the hospital?: Physician's Nurse/Office Staff, Physician, Other (Comment)(LTC) Does the patient report anything that got in the way of taking their medications?: (Given by LTC staff) In our efforts to provide the best possible care to you and others like you, can you think of anything that we could have done to help you after you left the hospital the first time, so that you might not have needed to return so soon?: Other (Comment)(LTC-)

## 2021-01-30 LAB
BACTERIA SPEC CULT: NORMAL
GRAM STN SPEC: NORMAL
SPECIAL REQUESTS,SREQ: NORMAL

## (undated) DEVICE — TUBING, SUCTION, 9/32" X 10', STRAIGHT: Brand: MEDLINE

## (undated) DEVICE — SOL IRR STRL H2O 1000ML BTL --

## (undated) DEVICE — JELLY,LUBE,STERILE,FLIP TOP,TUBE,4-OZ: Brand: MEDLINE

## (undated) DEVICE — PAD,PREPPING,CUFFED,24X48,7",NONSTERILE: Brand: MEDLINE

## (undated) DEVICE — SPONGE GZ W4XL4IN COT 12 PLY TYP VII WVN C FLD DSGN

## (undated) DEVICE — 1200CC GUARDIAN II: Brand: GUARDIAN

## (undated) DEVICE — STERILE POLYISOPRENE POWDER-FREE SURGICAL GLOVES: Brand: PROTEXIS

## (undated) DEVICE — GASTROSTOMY KIT STD 24 FR SAFETY PEG N RADLUC STRL ENDOVIVE